# Patient Record
Sex: MALE | Race: BLACK OR AFRICAN AMERICAN | Employment: OTHER | URBAN - METROPOLITAN AREA
[De-identification: names, ages, dates, MRNs, and addresses within clinical notes are randomized per-mention and may not be internally consistent; named-entity substitution may affect disease eponyms.]

---

## 2022-04-19 PROBLEM — K57.90 DIVERTICULOSIS: Status: ACTIVE | Noted: 2022-04-19

## 2022-04-19 PROBLEM — R63.4 WEIGHT LOSS: Status: ACTIVE | Noted: 2022-04-19

## 2022-04-19 PROBLEM — E11.9 TYPE II DIABETES MELLITUS (HCC): Status: ACTIVE | Noted: 2022-04-19

## 2022-04-19 RX ORDER — AMLODIPINE BESYLATE 10 MG/1
10 TABLET ORAL DAILY
COMMUNITY
Start: 2022-04-01

## 2022-04-19 RX ORDER — ROSUVASTATIN CALCIUM 40 MG/1
40 TABLET, COATED ORAL DAILY
COMMUNITY
Start: 2022-04-01

## 2022-04-20 ENCOUNTER — OFFICE VISIT (OUTPATIENT)
Dept: GASTROENTEROLOGY | Age: 72
End: 2022-04-20
Payer: MEDICARE

## 2022-04-20 VITALS
SYSTOLIC BLOOD PRESSURE: 133 MMHG | WEIGHT: 179 LBS | HEART RATE: 84 BPM | HEIGHT: 66 IN | OXYGEN SATURATION: 99 % | RESPIRATION RATE: 18 BRPM | TEMPERATURE: 97.9 F | BODY MASS INDEX: 28.77 KG/M2 | DIASTOLIC BLOOD PRESSURE: 70 MMHG

## 2022-04-20 DIAGNOSIS — R63.4 WEIGHT LOSS: Primary | ICD-10-CM

## 2022-04-20 DIAGNOSIS — R63.0 LOSS OF APPETITE: ICD-10-CM

## 2022-04-20 DIAGNOSIS — R19.7 DIARRHEA, UNSPECIFIED TYPE: ICD-10-CM

## 2022-04-20 PROBLEM — I10 HYPERTENSION: Status: ACTIVE | Noted: 2022-04-20

## 2022-04-20 PROCEDURE — G8419 CALC BMI OUT NRM PARAM NOF/U: HCPCS | Performed by: INTERNAL MEDICINE

## 2022-04-20 PROCEDURE — 3017F COLORECTAL CA SCREEN DOC REV: CPT | Performed by: INTERNAL MEDICINE

## 2022-04-20 PROCEDURE — G8754 DIAS BP LESS 90: HCPCS | Performed by: INTERNAL MEDICINE

## 2022-04-20 PROCEDURE — G8510 SCR DEP NEG, NO PLAN REQD: HCPCS | Performed by: INTERNAL MEDICINE

## 2022-04-20 PROCEDURE — 1101F PT FALLS ASSESS-DOCD LE1/YR: CPT | Performed by: INTERNAL MEDICINE

## 2022-04-20 PROCEDURE — G8752 SYS BP LESS 140: HCPCS | Performed by: INTERNAL MEDICINE

## 2022-04-20 PROCEDURE — G8427 DOCREV CUR MEDS BY ELIG CLIN: HCPCS | Performed by: INTERNAL MEDICINE

## 2022-04-20 PROCEDURE — 82270 OCCULT BLOOD FECES: CPT | Performed by: INTERNAL MEDICINE

## 2022-04-20 PROCEDURE — G8536 NO DOC ELDER MAL SCRN: HCPCS | Performed by: INTERNAL MEDICINE

## 2022-04-20 PROCEDURE — 99204 OFFICE O/P NEW MOD 45 MIN: CPT | Performed by: INTERNAL MEDICINE

## 2022-04-20 RX ORDER — ASPIRIN 81 MG/1
81 TABLET ORAL DAILY
COMMUNITY

## 2022-04-20 RX ORDER — TAMSULOSIN HYDROCHLORIDE 0.4 MG/1
0.4 CAPSULE ORAL DAILY
COMMUNITY

## 2022-04-20 RX ORDER — VALSARTAN 320 MG/1
320 TABLET ORAL DAILY
COMMUNITY

## 2022-04-20 NOTE — PROGRESS NOTES
Chief Complaint   Patient presents with    Weight Loss     1. Have you been to the ER, urgent care clinic since your last visit? Hospitalized since your last visit? No    2. Have you seen or consulted any other health care providers outside of the [address] [place] [address] since your last visit? Include any pap smears or colon screening. No   Visit Vitals  /70 (BP 1 Location: Left arm, BP Patient Position: Sitting, BP Cuff Size: Adult)   Pulse 84   Temp 97.9 °F (36.6 °C) (Oral)   Resp 18   Ht 5' 6\" (1.676 m)   Wt 81.2 kg (179 lb)   SpO2 99%   BMI 28.89 kg/m²   Patient seen by Dr Palmer Wood and EGD ordered. Patient want done on May 10, 2022. Patient will arrive at 10:30 am for 11:30 am procedure. Instructions given, Patient also given and explained how to collect stool cards. paperwork given to patient . Patient has [employer] [facility] [facility] # [id number].

## 2022-04-20 NOTE — PROGRESS NOTES
Layla Arellano is a 71 y.o. male who presents today for the following:  Chief Complaint   Patient presents with    Weight Loss         Allergies   Allergen Reactions    Ace Inhibitors Unknown (comments)     Patient denies        Current Outpatient Medications   Medication Sig    valsartan (DIOVAN) 320 mg tablet Take  by mouth daily.  tamsulosin (FLOMAX) 0.4 mg capsule Take 0.4 mg by mouth daily.  aspirin delayed-release 81 mg tablet Take  by mouth daily.  amLODIPine (NORVASC) 10 mg tablet     rosuvastatin (CRESTOR) 40 mg tablet      No current facility-administered medications for this visit. Past Medical History:   Diagnosis Date    Diverticulosis 4/19/2022    Hypercholesterolemia     Type II diabetes mellitus ([facility] [address] 4/19/2022    Weight loss 4/19/2022       Past Surgical History:   Procedure Laterality Date    COLONOSCOPY  05/13/2015    colon screen       Family History   Problem Relation Age of Onset    Stroke Mother     Stroke Father     Hypertension Other     High Cholesterol Other     Heart Disease Other        Social History     Socioeconomic History    Marital status:      Spouse name: Not on file    Number of children: Not on file    Years of education: Not on file    Highest education level: Not on file   Occupational History    Not on file   Tobacco Use    Smoking status: Never Smoker    Smokeless tobacco: Never Used   Vaping Use    Vaping Use: Never used   Substance and Sexual Activity    Alcohol use: Never    Drug use: Never    Sexual activity: Not on file   Other Topics Concern    Not on file   Social History Narrative    Not on file     Social Determinants of Health     Financial Resource Strain:     Difficulty of Paying Living Expenses: Not on file   Food Insecurity:     Worried About Running Out of Food in the Last Year: Not on file    Raphael of Food in the Last Year: Not on file   Transportation Needs:     Lack of Transportation (Medical):  Not on file    Lack of Transportation (Non-Medical): Not on file   Physical Activity:     Days of Exercise per Week: Not on file    Minutes of Exercise per Session: Not on file   Stress:     Feeling of Stress : Not on file   Social Connections:     Frequency of Communication with Friends and Family: Not on file    Frequency of Social Gatherings with Friends and Family: Not on file    Attends Worship Services: Not on file    Active Member of [address] [place] [address] Own Products or Organizations: Not on file    Attends Club or Organization Meetings: Not on file    Marital Status: Not on file   Intimate Partner Violence:     Fear of Current or Ex-Partner: Not on file    Emotionally Abused: Not on file    Physically Abused: Not on file    Sexually Abused: Not on file   Housing Stability:     Unable to Pay for Housing in the Last Year: Not on file    Number of [place] in the Last Year: Not on file    Unstable Housing in the Last Year: Not on file         HPI  80-year-old male with history of hypertension, hyperlipidemia, diabetes mellitus type 2, BPH, and diverticular disease who comes in for evaluation of weight loss. Patient states he generally feels well. Gio has been losing weight over the last few years. He states he is gone from a high of 280 pounds prior to his diagnosis of diabetes to his present weight of 179 pounds. He states that he stopped using metformin and was started on Jardiance, but that caused further weight loss so he stopped taking the Jardiance and does not take any medicine for his diabetes for the last couple months. He states his blood work was all normal or near normal.  He is not on any medication for his diabetes presently. He states his last glucose that he check was around 120. He does admit that he is not eating as much and his appetite is not as good. He also states he gets full fast.  His main concern continues to be his weight loss.   He states he does take a multivitamin daily as well as may drink Ensure or boost.  He states his bowel movements are still loose even off the metformin. No gross GI bleeding. No black or tarry stools. Review of Systems   Constitutional: Positive for weight loss. HENT: Negative. Negative for nosebleeds. Eyes: Negative. Respiratory: Negative. Cardiovascular: Negative. Gastrointestinal: Positive for abdominal pain and diarrhea. Negative for blood in stool, constipation, heartburn, melena, nausea and vomiting. Genitourinary: Negative. Musculoskeletal: Negative. Skin: Negative. Neurological: Negative. Endo/Heme/Allergies: Negative. Psychiatric/Behavioral: Negative. All other systems reviewed and are negative. Visit Vitals  /70 (BP 1 Location: Left arm, BP Patient Position: Sitting, BP Cuff Size: Adult)   Pulse 84   Temp 97.9 °F (36.6 °C) (Oral)   Resp 18   Ht 5' 6\" (1.676 m)   Wt 81.2 kg (179 lb)   SpO2 99%   BMI 28.89 kg/m²     Physical Exam  Vitals and nursing note reviewed. Constitutional:       Appearance: Normal appearance. He is obese. HENT:      Head: Normocephalic and atraumatic. Nose: Nose normal.      Mouth/Throat:      Mouth: Mucous membranes are moist.      Pharynx: Oropharynx is clear. Eyes:      General: No scleral icterus. Extraocular Movements: Extraocular movements intact. Conjunctiva/sclera: Conjunctivae normal.      Pupils: Pupils are equal, round, and reactive to light. Cardiovascular:      Rate and Rhythm: Normal rate and regular rhythm. Heart sounds: Normal heart sounds. Pulmonary:      Effort: Pulmonary effort is normal.      Breath sounds: Normal breath sounds. Abdominal:      General: Bowel sounds are normal. There is no distension. Palpations: Abdomen is soft. There is no mass. Tenderness: There is no abdominal tenderness. There is no right CVA tenderness, left CVA tenderness, guarding or rebound. Hernia: No hernia is present.    Musculoskeletal: General: Normal range of motion. Cervical back: Normal range of motion and neck supple. Skin:     General: Skin is warm and dry. Coloration: Skin is not jaundiced. Neurological:      General: No focal deficit present. Mental Status: He is alert and oriented to person, place, and time. Psychiatric:         Mood and Affect: Mood normal.         Behavior: Behavior normal.         Thought Content: Thought content normal.         Judgment: Judgment normal.            1. Weight loss  Schedule patient for upper endoscopy to further evaluate the upper digestive tract. We will check stools for occult blood. - UPPER GI ENDOSCOPY,DIAGNOSIS; Future  - AMB POC FECAL BLOOD, OCCULT, QL 3 CARDS    2. Loss of appetite    - UPPER GI ENDOSCOPY,DIAGNOSIS; Future    3. Diarrhea, unspecified type  Uncertain cause.   Prior history of diverticular disease  - UPPER GI ENDOSCOPY,DIAGNOSIS; Future

## 2022-04-20 NOTE — H&P (VIEW-ONLY)
Arpit Cross is a 71 y.o. male who presents today for the following:  Chief Complaint   Patient presents with    Weight Loss         Allergies   Allergen Reactions    Ace Inhibitors Unknown (comments)     Patient denies        Current Outpatient Medications   Medication Sig    valsartan (DIOVAN) 320 mg tablet Take  by mouth daily.  tamsulosin (FLOMAX) 0.4 mg capsule Take 0.4 mg by mouth daily.  aspirin delayed-release 81 mg tablet Take  by mouth daily.  amLODIPine (NORVASC) 10 mg tablet     rosuvastatin (CRESTOR) 40 mg tablet      No current facility-administered medications for this visit. Past Medical History:   Diagnosis Date    Diverticulosis 4/19/2022    Hypercholesterolemia     Type II diabetes mellitus ([facility] [address] 4/19/2022    Weight loss 4/19/2022       Past Surgical History:   Procedure Laterality Date    COLONOSCOPY  05/13/2015    colon screen       Family History   Problem Relation Age of Onset    Stroke Mother     Stroke Father     Hypertension Other     High Cholesterol Other     Heart Disease Other        Social History     Socioeconomic History    Marital status:      Spouse name: Not on file    Number of children: Not on file    Years of education: Not on file    Highest education level: Not on file   Occupational History    Not on file   Tobacco Use    Smoking status: Never Smoker    Smokeless tobacco: Never Used   Vaping Use    Vaping Use: Never used   Substance and Sexual Activity    Alcohol use: Never    Drug use: Never    Sexual activity: Not on file   Other Topics Concern    Not on file   Social History Narrative    Not on file     Social Determinants of Health     Financial Resource Strain:     Difficulty of Paying Living Expenses: Not on file   Food Insecurity:     Worried About Running Out of Food in the Last Year: Not on file    Raphael of Food in the Last Year: Not on file   Transportation Needs:     Lack of Transportation (Medical):  Not on file    Lack of Transportation (Non-Medical): Not on file   Physical Activity:     Days of Exercise per Week: Not on file    Minutes of Exercise per Session: Not on file   Stress:     Feeling of Stress : Not on file   Social Connections:     Frequency of Communication with Friends and Family: Not on file    Frequency of Social Gatherings with Friends and Family: Not on file    Attends Taoism Services: Not on file    Active Member of [address] [place] [address] [employer] or Organizations: Not on file    Attends Club or Organization Meetings: Not on file    Marital Status: Not on file   Intimate Partner Violence:     Fear of Current or Ex-Partner: Not on file    Emotionally Abused: Not on file    Physically Abused: Not on file    Sexually Abused: Not on file   Housing Stability:     Unable to Pay for Housing in the Last Year: Not on file    Number of [place] in the Last Year: Not on file    Unstable Housing in the Last Year: Not on file         HPI  52-year-old male with history of hypertension, hyperlipidemia, diabetes mellitus type 2, BPH, and diverticular disease who comes in for evaluation of weight loss. Patient states he generally feels well. Gio has been losing weight over the last few years. He states he is gone from a high of 280 pounds prior to his diagnosis of diabetes to his present weight of 179 pounds. He states that he stopped using metformin and was started on Jardiance, but that caused further weight loss so he stopped taking the Jardiance and does not take any medicine for his diabetes for the last couple months. He states his blood work was all normal or near normal.  He is not on any medication for his diabetes presently. He states his last glucose that he check was around 120. He does admit that he is not eating as much and his appetite is not as good. He also states he gets full fast.  His main concern continues to be his weight loss.   He states he does take a multivitamin daily as well as may drink Ensure or boost.  He states his bowel movements are still loose even off the metformin. No gross GI bleeding. No black or tarry stools. Review of Systems   Constitutional: Positive for weight loss. HENT: Negative. Negative for nosebleeds. Eyes: Negative. Respiratory: Negative. Cardiovascular: Negative. Gastrointestinal: Positive for abdominal pain and diarrhea. Negative for blood in stool, constipation, heartburn, melena, nausea and vomiting. Genitourinary: Negative. Musculoskeletal: Negative. Skin: Negative. Neurological: Negative. Endo/Heme/Allergies: Negative. Psychiatric/Behavioral: Negative. All other systems reviewed and are negative. Visit Vitals  /70 (BP 1 Location: Left arm, BP Patient Position: Sitting, BP Cuff Size: Adult)   Pulse 84   Temp 97.9 °F (36.6 °C) (Oral)   Resp 18   Ht 5' 6\" (1.676 m)   Wt 81.2 kg (179 lb)   SpO2 99%   BMI 28.89 kg/m²     Physical Exam  Vitals and nursing note reviewed. Constitutional:       Appearance: Normal appearance. He is obese. HENT:      Head: Normocephalic and atraumatic. Nose: Nose normal.      Mouth/Throat:      Mouth: Mucous membranes are moist.      Pharynx: Oropharynx is clear. Eyes:      General: No scleral icterus. Extraocular Movements: Extraocular movements intact. Conjunctiva/sclera: Conjunctivae normal.      Pupils: Pupils are equal, round, and reactive to light. Cardiovascular:      Rate and Rhythm: Normal rate and regular rhythm. Heart sounds: Normal heart sounds. Pulmonary:      Effort: Pulmonary effort is normal.      Breath sounds: Normal breath sounds. Abdominal:      General: Bowel sounds are normal. There is no distension. Palpations: Abdomen is soft. There is no mass. Tenderness: There is no abdominal tenderness. There is no right CVA tenderness, left CVA tenderness, guarding or rebound. Hernia: No hernia is present.    Musculoskeletal: General: Normal range of motion. Cervical back: Normal range of motion and neck supple. Skin:     General: Skin is warm and dry. Coloration: Skin is not jaundiced. Neurological:      General: No focal deficit present. Mental Status: He is alert and oriented to person, place, and time. Psychiatric:         Mood and Affect: Mood normal.         Behavior: Behavior normal.         Thought Content: Thought content normal.         Judgment: Judgment normal.            1. Weight loss  Schedule patient for upper endoscopy to further evaluate the upper digestive tract. We will check stools for occult blood. - UPPER GI ENDOSCOPY,DIAGNOSIS; Future  - AMB POC FECAL BLOOD, OCCULT, QL 3 CARDS    2. Loss of appetite    - UPPER GI ENDOSCOPY,DIAGNOSIS; Future    3. Diarrhea, unspecified type  Uncertain cause.   Prior history of diverticular disease  - UPPER GI ENDOSCOPY,DIAGNOSIS; Future

## 2022-04-25 LAB
HEMOCCULT STL QL: POSITIVE
VALID INTERNAL CONTROL?: YES

## 2022-04-26 ENCOUNTER — TELEPHONE (OUTPATIENT)
Dept: GASTROENTEROLOGY | Age: 72
End: 2022-04-26

## 2022-04-26 NOTE — PROGRESS NOTES
Tell patient that all 3 stool samples were positive for blood. We will further evaluate with the upper endoscopy he is scheduled for next month.

## 2022-04-26 NOTE — TELEPHONE ENCOUNTER
----- Message from Pilar Woodard MD sent at 4/25/2022 10:05 PM EDT -----  Tell patient that all 3 stool samples were positive for blood. We will further evaluate with the upper endoscopy he is scheduled for next month.

## 2022-05-10 ENCOUNTER — HOSPITAL ENCOUNTER (OUTPATIENT)
Age: 72
Setting detail: OUTPATIENT SURGERY
Discharge: HOME | End: 2022-05-10
Attending: INTERNAL MEDICINE | Admitting: INTERNAL MEDICINE
Payer: MEDICARE

## 2022-05-10 ENCOUNTER — ANESTHESIA (OUTPATIENT)
Dept: ENDOSCOPY | Age: 72
End: 2022-05-10
Payer: MEDICARE

## 2022-05-10 ENCOUNTER — ANESTHESIA EVENT (OUTPATIENT)
Dept: ENDOSCOPY | Age: 72
End: 2022-05-10
Payer: MEDICARE

## 2022-05-10 VITALS
HEART RATE: 80 BPM | HEIGHT: 67 IN | TEMPERATURE: 98.1 F | BODY MASS INDEX: 27.47 KG/M2 | SYSTOLIC BLOOD PRESSURE: 142 MMHG | OXYGEN SATURATION: 100 % | WEIGHT: 175 LBS | RESPIRATION RATE: 18 BRPM | DIASTOLIC BLOOD PRESSURE: 74 MMHG

## 2022-05-10 DIAGNOSIS — K29.50 CHRONIC GASTRITIS WITHOUT BLEEDING, UNSPECIFIED GASTRITIS TYPE: Primary | ICD-10-CM

## 2022-05-10 LAB
GLUCOSE BLD STRIP.AUTO-MCNC: 98 MG/DL (ref 65–117)
PERFORMED BY, TECHID: NORMAL

## 2022-05-10 PROCEDURE — 76040000019: Performed by: INTERNAL MEDICINE

## 2022-05-10 PROCEDURE — 88312 SPECIAL STAINS GROUP 1: CPT

## 2022-05-10 PROCEDURE — 88305 TISSUE EXAM BY PATHOLOGIST: CPT

## 2022-05-10 PROCEDURE — 74011000250 HC RX REV CODE- 250: Performed by: NURSE ANESTHETIST, CERTIFIED REGISTERED

## 2022-05-10 PROCEDURE — 74011000258 HC RX REV CODE- 258: Performed by: NURSE ANESTHETIST, CERTIFIED REGISTERED

## 2022-05-10 PROCEDURE — 74011250636 HC RX REV CODE- 250/636: Performed by: NURSE ANESTHETIST, CERTIFIED REGISTERED

## 2022-05-10 PROCEDURE — 82962 GLUCOSE BLOOD TEST: CPT

## 2022-05-10 PROCEDURE — 77030021593 HC FCPS BIOP ENDOSC BSC -A: Performed by: INTERNAL MEDICINE

## 2022-05-10 PROCEDURE — 43239 EGD BIOPSY SINGLE/MULTIPLE: CPT | Performed by: INTERNAL MEDICINE

## 2022-05-10 PROCEDURE — 74011250636 HC RX REV CODE- 250/636: Performed by: INTERNAL MEDICINE

## 2022-05-10 PROCEDURE — 2709999900 HC NON-CHARGEABLE SUPPLY: Performed by: INTERNAL MEDICINE

## 2022-05-10 PROCEDURE — 76060000031 HC ANESTHESIA FIRST 0.5 HR: Performed by: INTERNAL MEDICINE

## 2022-05-10 RX ORDER — SODIUM CHLORIDE 0.9 % (FLUSH) 0.9 %
5-40 SYRINGE (ML) INJECTION AS NEEDED
Status: DISCONTINUED | OUTPATIENT
Start: 2022-05-10 | End: 2022-05-10 | Stop reason: HOSPADM

## 2022-05-10 RX ORDER — PROPOFOL 10 MG/ML
INJECTION, EMULSION INTRAVENOUS AS NEEDED
Status: DISCONTINUED | OUTPATIENT
Start: 2022-05-10 | End: 2022-05-10 | Stop reason: HOSPADM

## 2022-05-10 RX ORDER — SODIUM CHLORIDE 0.9 % (FLUSH) 0.9 %
5-40 SYRINGE (ML) INJECTION EVERY 8 HOURS
Status: DISCONTINUED | OUTPATIENT
Start: 2022-05-10 | End: 2022-05-10 | Stop reason: HOSPADM

## 2022-05-10 RX ORDER — SODIUM CHLORIDE 9 MG/ML
125 INJECTION, SOLUTION INTRAVENOUS CONTINUOUS
Status: DISCONTINUED | OUTPATIENT
Start: 2022-05-10 | End: 2022-05-10 | Stop reason: HOSPADM

## 2022-05-10 RX ORDER — GLYCOPYRROLATE 0.2 MG/ML
INJECTION INTRAMUSCULAR; INTRAVENOUS AS NEEDED
Status: DISCONTINUED | OUTPATIENT
Start: 2022-05-10 | End: 2022-05-10 | Stop reason: HOSPADM

## 2022-05-10 RX ORDER — FAMOTIDINE 40 MG/1
40 TABLET, FILM COATED ORAL DAILY
Qty: 30 TABLET | Refills: 3 | Status: SHIPPED | OUTPATIENT
Start: 2022-05-10 | End: 2022-09-15 | Stop reason: CLARIF

## 2022-05-10 RX ORDER — SODIUM CHLORIDE 9 MG/ML
INJECTION, SOLUTION INTRAVENOUS
Status: DISCONTINUED | OUTPATIENT
Start: 2022-05-10 | End: 2022-05-10 | Stop reason: HOSPADM

## 2022-05-10 RX ADMIN — PROPOFOL 100 MG: 10 INJECTION, EMULSION INTRAVENOUS at 13:52

## 2022-05-10 RX ADMIN — SODIUM CHLORIDE 125 ML/HR: 9 INJECTION, SOLUTION INTRAVENOUS at 10:45

## 2022-05-10 RX ADMIN — GLYCOPYRROLATE 0.2 MG: 0.2 INJECTION INTRAMUSCULAR; INTRAVENOUS at 13:50

## 2022-05-10 RX ADMIN — TOPICAL ANESTHETIC 1 SPRAY: 200 SPRAY DENTAL; PERIODONTAL at 13:50

## 2022-05-10 RX ADMIN — SODIUM CHLORIDE: 9 INJECTION, SOLUTION INTRAVENOUS at 13:47

## 2022-05-10 NOTE — OP NOTES
EGD Procedure Note        Patient: Johnie Mosley MRN: [id number]  SSN: [id number]    YOB: 1950  Age: 71 y.o. Sex: male        Date/Time:  5/10/2022 2:04 PM         IMPRESSION:       1. Generalized gastritis  2. Distal esophagitis (grade 1)       RECOMMENDATIONS:    1. Check biopsy results. 2. Famotidine 40 mg daily    Procedure: Esophagogastroduodenoscopy with cold biopsy    Indication: Weight loss, loss of appetite, diarrhea    Endoscopist:  Desire Vargas MD    Referring Provider:   Rachel Mendez MD    History: The history and physical exam were reviewed and updated. Endoscope: GIF H190 Olympus video endoscope    Extent of Exam: Second part of the duodenum    ASA: Grade 2    Anethesia/Sedation:  TIVA    Description of the procedure: The procedure was discussed with the patient including risks, benefits, alternatives including risks of iv sedation, bleeding, perforation and aspiration. A safety timeout was performed. The patient was placed in the left lateral decubitus position. A bite block was placed. The patient was using standard protocol. The patients vital signs were monitored at all times including heart rate/rhythm, blood pressure and oxygen saturation. The endoscope was then passed under direct visualization to the second part of the duodenum. The endoscope was then slowly withdrawn while visualizing the mucosa. In the stomach a retroflexion was performed and gastric fundus and cardia visualized. The patient was then transferred to recovery in stable condition. Findings:   Esophagus: The esophageal mucosa was inflamed in the distal esophagus consistent with a grade 1 esophagitis. .  Stomach: The gastric mucosa was inflamed throughout the entire stomach with a few scattered erosions. Multiple biopsies were taken in the gastric antrum. .   Duodenum: The duodenum mucosa was normal with no ulceration, mass, stricture and no evidence of villous atrophy. Therapies: None    Specimens:   ID Type Source Tests Collected by Time Destination   1 :  Preservative Stomach, Antrum  Meghann Pacheco MD 5/10/2022 1216 Pathology       Assistants: Eber Ricks           EBL: Minimal    Complications:   None; patient tolerated the procedure well.      Implants: None    Discharge disposition:  Out of the recovery area when discharge criteria met         Christopher Giang MD  May 10, 2022  2:04 PM

## 2022-05-10 NOTE — DISCHARGE INSTRUCTIONS

## 2022-05-10 NOTE — INTERVAL H&P NOTE
Update History & Physical    The Patient's History and Physical of May 10, 2022,  was reviewed with the patient and I examined the patient. There was no change. The surgical site was confirmed by the patient and me. Plan:  The risk, benefits, expected outcome, and alternative to the recommended procedure have been discussed with the patient. Patient understands and wants to proceed with the procedure.     Electronically signed by Dale Morales MD on 5/10/2022 at 11:02 AM

## 2022-05-10 NOTE — ANESTHESIA POSTPROCEDURE EVALUATION
Procedure(s):  ESOPHAGOGASTRODUODENOSCOPY (EGD) (TIVA).     total IV anesthesia    Anesthesia Post Evaluation      Multimodal analgesia: multimodal analgesia not used between 6 hours prior to anesthesia start to PACU discharge  Patient location during evaluation: PACU  Patient participation: complete - patient participated  Pain management: adequate  Airway patency: patent  Anesthetic complications: no  Cardiovascular status: acceptable and stable  Respiratory status: acceptable and room air  Hydration status: acceptable  Post anesthesia nausea and vomiting:  none  Final Post Anesthesia Temperature Assessment:  Normothermia (36.0-37.5 degrees C)      INITIAL Post-op Vital signs:   Vitals Value Taken Time   /85 05/10/22 1401   Temp 36.7 °C (98.1 °F) 05/10/22 1401   Pulse 89 05/10/22 1401   Resp 18 05/10/22 1401   SpO2 100 % 05/10/22 1401

## 2022-05-10 NOTE — ANESTHESIA PREPROCEDURE EVALUATION
Relevant Problems   CARDIOVASCULAR   (+) Hypertension      ENDOCRINE   (+) Type II diabetes mellitus (HCC)       Anesthetic History   No history of anesthetic complications  Other anesthesia complications          Review of Systems / Medical History  Patient summary reviewed, nursing notes reviewed and pertinent labs reviewed    Pulmonary  Within defined limits                 Neuro/Psych   Within defined limits           Cardiovascular    Hypertension                   GI/Hepatic/Renal  Within defined limits              Endo/Other    Diabetes         Other Findings              Physical Exam    Airway  Mallampati: II  TM Distance: 4 - 6 cm  Neck ROM: normal range of motion        Cardiovascular    Rhythm: regular  Rate: normal         Dental    Dentition: Poor dentition and Loose teeth     Pulmonary  Breath sounds clear to auscultation               Abdominal  Abdominal exam normal       Other Findings            Anesthetic Plan    ASA: 3  Anesthesia type: total IV anesthesia            Anesthetic plan and risks discussed with: Patient

## 2022-05-13 ENCOUNTER — TELEPHONE (OUTPATIENT)
Dept: GASTROENTEROLOGY | Age: 72
End: 2022-05-13

## 2022-05-13 DIAGNOSIS — A04.8 HELICOBACTER PYLORI INFECTION: Primary | ICD-10-CM

## 2022-05-13 RX ORDER — PANTOPRAZOLE SODIUM 40 MG/1
40 TABLET, DELAYED RELEASE ORAL DAILY
Qty: 30 TABLET | Refills: 3 | Status: SHIPPED | OUTPATIENT
Start: 2022-05-13 | End: 2022-09-15 | Stop reason: CLARIF

## 2022-05-13 RX ORDER — METRONIDAZOLE 500 MG/1
500 TABLET ORAL 2 TIMES DAILY
Qty: 28 TABLET | Refills: 0 | Status: SHIPPED | OUTPATIENT
Start: 2022-05-13 | End: 2022-07-11 | Stop reason: ALTCHOICE

## 2022-05-13 RX ORDER — AMOXICILLIN 500 MG/1
1000 CAPSULE ORAL 2 TIMES DAILY
Qty: 56 CAPSULE | Refills: 0 | Status: SHIPPED | OUTPATIENT
Start: 2022-05-13 | End: 2022-07-11 | Stop reason: ALTCHOICE

## 2022-05-13 NOTE — TELEPHONE ENCOUNTER
Patient notified that the biopsies taken in his stomach showed gastritis/inflammation.  It also showed an infection with Helicobacter pylori.  This infection will need to be treated.  We will send in 2 antibiotics plus we will change his acid lowering medicine to pantoprazole 40 mg daily. Octavia Powers can stop the famotidine while on that medicine.  Give patient a follow-up visit in 2 months. Appt given.

## 2022-05-13 NOTE — PROGRESS NOTES
Tell patient that the biopsies taken in his stomach showed gastritis/inflammation. It also showed an infection with Helicobacter pylori. This infection will need to be treated. We will send in 2 antibiotics plus we will change his acid lowering medicine to pantoprazole 40 mg daily. He can stop the famotidine while on that medicine. Give patient a follow-up visit in 2 months.

## 2022-07-11 ENCOUNTER — OFFICE VISIT (OUTPATIENT)
Dept: GASTROENTEROLOGY | Age: 72
End: 2022-07-11
Payer: MEDICARE

## 2022-07-11 VITALS
HEIGHT: 67 IN | WEIGHT: 157.6 LBS | SYSTOLIC BLOOD PRESSURE: 140 MMHG | OXYGEN SATURATION: 99 % | RESPIRATION RATE: 18 BRPM | TEMPERATURE: 97.7 F | HEART RATE: 86 BPM | DIASTOLIC BLOOD PRESSURE: 74 MMHG | BODY MASS INDEX: 24.74 KG/M2

## 2022-07-11 DIAGNOSIS — R19.5 OCCULT GI BLEEDING: ICD-10-CM

## 2022-07-11 DIAGNOSIS — R63.4 WEIGHT LOSS: Primary | ICD-10-CM

## 2022-07-11 DIAGNOSIS — A04.8 HELICOBACTER PYLORI INFECTION: ICD-10-CM

## 2022-07-11 DIAGNOSIS — K21.00 GASTROESOPHAGEAL REFLUX DISEASE WITH ESOPHAGITIS WITHOUT HEMORRHAGE: ICD-10-CM

## 2022-07-11 DIAGNOSIS — K29.50 CHRONIC GASTRITIS WITHOUT BLEEDING, UNSPECIFIED GASTRITIS TYPE: ICD-10-CM

## 2022-07-11 PROCEDURE — G8427 DOCREV CUR MEDS BY ELIG CLIN: HCPCS | Performed by: INTERNAL MEDICINE

## 2022-07-11 PROCEDURE — 1101F PT FALLS ASSESS-DOCD LE1/YR: CPT | Performed by: INTERNAL MEDICINE

## 2022-07-11 PROCEDURE — G8420 CALC BMI NORM PARAMETERS: HCPCS | Performed by: INTERNAL MEDICINE

## 2022-07-11 PROCEDURE — 1123F ACP DISCUSS/DSCN MKR DOCD: CPT | Performed by: INTERNAL MEDICINE

## 2022-07-11 PROCEDURE — 3017F COLORECTAL CA SCREEN DOC REV: CPT | Performed by: INTERNAL MEDICINE

## 2022-07-11 PROCEDURE — G8754 DIAS BP LESS 90: HCPCS | Performed by: INTERNAL MEDICINE

## 2022-07-11 PROCEDURE — G8753 SYS BP > OR = 140: HCPCS | Performed by: INTERNAL MEDICINE

## 2022-07-11 PROCEDURE — G8536 NO DOC ELDER MAL SCRN: HCPCS | Performed by: INTERNAL MEDICINE

## 2022-07-11 PROCEDURE — 99214 OFFICE O/P EST MOD 30 MIN: CPT | Performed by: INTERNAL MEDICINE

## 2022-07-11 PROCEDURE — G8510 SCR DEP NEG, NO PLAN REQD: HCPCS | Performed by: INTERNAL MEDICINE

## 2022-07-11 RX ORDER — MEGESTROL ACETATE 40 MG/1
40 TABLET ORAL 2 TIMES DAILY
Qty: 60 TABLET | Refills: 3 | Status: SHIPPED | OUTPATIENT
Start: 2022-07-11 | End: 2022-09-16

## 2022-07-11 NOTE — PROGRESS NOTES
Chief Complaint   Patient presents with    Weight Loss     2 month f/u      1. Have you been to the ER, urgent care clinic since your last visit? Hospitalized since your last visit? No    2. Have you seen or consulted any other health care providers outside of the [address] [place] [address] since your last visit? Include any pap smears or colon screening.  No\  Visit Vitals  BP (!) 140/74 (BP 1 Location: Right arm, BP Patient Position: Sitting, BP Cuff Size: Adult)   Pulse 86   Temp 97.7 °F (36.5 °C) (Oral)   Resp 18   Ht 5' 7\" (1.702 m)   Wt 71.5 kg (157 lb 9.6 oz)   SpO2 99%   BMI 24.68 kg/m²

## 2022-07-12 NOTE — PROGRESS NOTES
Zhang Trevino is a 71 y.o. male who presents today for the following:  Chief Complaint   Patient presents with    Weight Loss     2 month f/u          Allergies   Allergen Reactions    Ace Inhibitors Unknown (comments)     Patient denies        Current Outpatient Medications   Medication Sig    megestroL (MEGACE) 40 mg tablet Take 1 Tablet by mouth two (2) times a day.  pantoprazole (PROTONIX) 40 mg tablet Take 1 Tablet by mouth daily. Indications: inflammation of the stomach lining caused by H. pylori    valsartan (DIOVAN) 320 mg tablet Take  by mouth daily.  tamsulosin (FLOMAX) 0.4 mg capsule Take 0.4 mg by mouth daily.  aspirin delayed-release 81 mg tablet Take  by mouth daily.  amLODIPine (NORVASC) 10 mg tablet     rosuvastatin (CRESTOR) 40 mg tablet     famotidine (PEPCID) 40 mg tablet Take 1 Tablet by mouth daily. No current facility-administered medications for this visit.        Past Medical History:   Diagnosis Date    Diverticulosis 4/19/2022    Hypercholesterolemia     Hyperlipemia     Hypertension     [facility] 4/19/[facility]22    Weight loss 4/19/2022       Past Surgical History:   Procedure Laterality Date    COLONOSCOPY  05/13/2015    colon screen    ENDOSCOPY VISIT-OUTPATIENT  04/20/2022       Family History   Problem Relation Age of Onset    Stroke Mother     Stroke Father     Hypertension Other     High Cholesterol Other     Heart Disease Other        Social History     Socioeconomic History    Marital status:      Spouse name: Not on file    Number of children: Not on file    Years of education: Not on file    Highest education level: Not on file   Occupational History    Not on file   Tobacco Use    Smoking status: Never Smoker    Smokeless tobacco: Never Used   Vaping Use    Vaping Use: Never used   Substance and Sexual Activity    Alcohol use: Never    Drug use: Never    Sexual activity: Not on file   Other Topics Concern    Not on file   Social History Narrative    Not on file     Social Determinants of Health     Financial Resource Strain:     Difficulty of Paying Living Expenses: Not on file   Food Insecurity:     Worried About Running Out of Food in the Last Year: Not on file    Raphael of Food in the Last Year: Not on file   Transportation Needs:     Lack of Transportation (Medical): Not on file    Lack of Transportation (Non-Medical): Not on file   Physical Activity:     Days of Exercise per Week: Not on file    Minutes of Exercise per Session: Not on file   Stress:     Feeling of Stress : Not on file   Social Connections:     Frequency of Communication with Friends and Family: Not on file    Frequency of Social Gatherings with Friends and Family: Not on file    Attends Gnosticist Services: Not on file    Active Member of [address] [place] [address] [employer] or Organizations: Not on file    Attends Club or Organization Meetings: Not on file    Marital Status: Not on file   Intimate Partner Violence:     Fear of Current or Ex-Partner: Not on file    Emotionally Abused: Not on file    Physically Abused: Not on file    Sexually Abused: Not on file   Housing Stability:     Unable to Pay for Housing in the Last Year: Not on file    Number of [place] in the Last Year: Not on file    Unstable Housing in the Last Year: Not on file         HPI  50-year-old male with history of hypertension, hyperlipidemia, diabetes mellitus type 2, BPH, and diverticular disease who comes in for follow-up visit having an EGD to further evaluate abnormal weight loss. The EGD is showed generalized gastritis with a Helicobacter pylori infection and reflux esophagitis. The patient was treated for the H. pylori infection x14 days. Patient states he does feel better. He however still has a poor appetite. He is still losing weight.   He states he tried to drink Ensure and a protein drink however because diarrhea so he stopped using it he states his bowel movements are still loose a lot. No regular indigestion. No oily stools or undigested food matter. No palpitations. He states he completed antibiotic therapy for the H. pylori and remains on pantoprazole 40 mg daily still. Review of Systems   Constitutional: Positive for weight loss. HENT: Negative. Negative for nosebleeds. Eyes: Negative. Respiratory: Negative. Cardiovascular: Negative. Gastrointestinal: Positive for blood in stool and diarrhea. Negative for abdominal pain, constipation, heartburn, melena, nausea and vomiting. Genitourinary: Negative. Musculoskeletal: Positive for joint pain. Skin: Negative. Neurological: Negative. Endo/Heme/Allergies: Negative. Psychiatric/Behavioral: Negative. All other systems reviewed and are negative. Visit Vitals  BP (!) 140/74 (BP 1 Location: Right arm, BP Patient Position: Sitting, BP Cuff Size: Adult)   Pulse 86   Temp 97.7 °F (36.5 °C) (Oral)   Resp 18   Ht 5' 7\" (1.702 m)   Wt 71.5 kg (157 lb 9.6 oz)   SpO2 99%   BMI 24.68 kg/m²     Physical Exam  Vitals and nursing note reviewed. Constitutional:       Appearance: Normal appearance. He is normal weight. HENT:      Head: Normocephalic and atraumatic. Nose: Nose normal.      Mouth/Throat:      Mouth: Mucous membranes are moist.      Pharynx: Oropharynx is clear. Eyes:      General: No scleral icterus. Extraocular Movements: Extraocular movements intact. Conjunctiva/sclera: Conjunctivae normal.      Pupils: Pupils are equal, round, and reactive to light. Cardiovascular:      Rate and Rhythm: Normal rate and regular rhythm. Heart sounds: Normal heart sounds. Pulmonary:      Effort: Pulmonary effort is normal.      Breath sounds: Normal breath sounds. Abdominal:      General: Bowel sounds are normal. There is no distension. Palpations: Abdomen is soft. There is no mass. Tenderness: There is no abdominal tenderness.  There is no right CVA tenderness, left CVA tenderness, guarding or rebound. Hernia: No hernia is present. Musculoskeletal:         General: Normal range of motion. Cervical back: Normal range of motion and neck supple. Skin:     General: Skin is warm and dry. Coloration: Skin is not jaundiced. Neurological:      General: No focal deficit present. Mental Status: He is alert and oriented to person, place, and time. Psychiatric:         Mood and Affect: Mood normal.         Behavior: Behavior normal.         Thought Content: Thought content normal.         Judgment: Judgment normal.            1. Weight loss  Give patient trial of appetite stimulant.  - megestroL (MEGACE) 40 mg tablet; Take 1 Tablet by mouth two (2) times a day. Dispense: 60 Tablet; Refill: 3    2. Occult GI bleeding  Secondary to the generalized gastritis which was noted on the EGD. 3. Helicobacter pylori infection  Patient has completed treatment    4. Chronic gastritis without bleeding, unspecified gastritis type  Probably secondary to the H. pylori infection    5. Gastroesophageal reflux disease with esophagitis without hemorrhage  Continue the pantoprazole 40 mg daily.

## 2022-08-02 ENCOUNTER — TRANSCRIBE ORDER (OUTPATIENT)
Dept: SCHEDULING | Age: 72
End: 2022-08-02

## 2022-08-02 DIAGNOSIS — R63.4 LOSS OF WEIGHT: ICD-10-CM

## 2022-08-02 DIAGNOSIS — R16.0 HEPATOMEGALY: ICD-10-CM

## 2022-08-02 DIAGNOSIS — E80.6 HYPERBILIRUBINEMIA: ICD-10-CM

## 2022-08-02 DIAGNOSIS — R94.5 NONSPECIFIC ABNORMAL RESULTS OF LIVER FUNCTION STUDY: Primary | ICD-10-CM

## 2022-08-04 ENCOUNTER — HOSPITAL ENCOUNTER (OUTPATIENT)
Dept: CT IMAGING | Age: 72
Discharge: HOME | End: 2022-08-04
Attending: FAMILY MEDICINE
Payer: MEDICARE

## 2022-08-04 ENCOUNTER — TRANSCRIBE ORDER (OUTPATIENT)
Dept: REGISTRATION | Age: 72
End: 2022-08-04

## 2022-08-04 ENCOUNTER — HOSPITAL ENCOUNTER (OUTPATIENT)
Dept: LAB | Age: 72
Discharge: HOME | End: 2022-08-04
Attending: FAMILY MEDICINE
Payer: MEDICARE

## 2022-08-04 DIAGNOSIS — R94.5 NONSPECIFIC ABNORMAL RESULTS OF LIVER FUNCTION STUDY: ICD-10-CM

## 2022-08-04 DIAGNOSIS — R16.0 HEPATOMEGALY: ICD-10-CM

## 2022-08-04 DIAGNOSIS — R94.5 NONSPECIFIC ABNORMAL RESULTS OF LIVER FUNCTION STUDY: Primary | ICD-10-CM

## 2022-08-04 DIAGNOSIS — E80.6 HYPERBILIRUBINEMIA: ICD-10-CM

## 2022-08-04 DIAGNOSIS — R63.4 LOSS OF WEIGHT: ICD-10-CM

## 2022-08-04 LAB — CREAT SERPL-MCNC: 0.86 MG/DL (ref 0.7–1.3)

## 2022-08-04 PROCEDURE — 82565 ASSAY OF CREATININE: CPT

## 2022-08-04 PROCEDURE — 36415 COLL VENOUS BLD VENIPUNCTURE: CPT

## 2022-08-04 PROCEDURE — 74170 CT ABD WO CNTRST FLWD CNTRST: CPT

## 2022-08-04 PROCEDURE — 74011000636 HC RX REV CODE- 636: Performed by: FAMILY MEDICINE

## 2022-08-04 RX ADMIN — IOPAMIDOL 100 ML: 755 INJECTION, SOLUTION INTRAVENOUS at 09:51

## 2022-09-15 ENCOUNTER — APPOINTMENT (OUTPATIENT)
Dept: CT IMAGING | Age: 72
End: 2022-09-15
Attending: EMERGENCY MEDICINE
Payer: MEDICARE

## 2022-09-15 ENCOUNTER — HOSPITAL ENCOUNTER (INPATIENT)
Age: 72
LOS: 7 days | Discharge: HOME HEALTH | DRG: 871 | End: 2022-09-22
Attending: EMERGENCY MEDICINE | Admitting: HOSPITALIST
Payer: MEDICARE

## 2022-09-15 ENCOUNTER — HOSPITAL ENCOUNTER (EMERGENCY)
Age: 72
Discharge: ANOTHER HOSPITAL | End: 2022-09-15
Attending: EMERGENCY MEDICINE | Admitting: EMERGENCY MEDICINE
Payer: MEDICARE

## 2022-09-15 ENCOUNTER — APPOINTMENT (OUTPATIENT)
Dept: GENERAL RADIOLOGY | Age: 72
End: 2022-09-15
Attending: EMERGENCY MEDICINE
Payer: MEDICARE

## 2022-09-15 VITALS
HEIGHT: 67 IN | OXYGEN SATURATION: 98 % | SYSTOLIC BLOOD PRESSURE: 114 MMHG | RESPIRATION RATE: 16 BRPM | DIASTOLIC BLOOD PRESSURE: 72 MMHG | HEART RATE: 99 BPM | WEIGHT: 146 LBS | BODY MASS INDEX: 22.91 KG/M2 | TEMPERATURE: 97.9 F

## 2022-09-15 DIAGNOSIS — R65.10 SIRS (SYSTEMIC INFLAMMATORY RESPONSE SYNDROME) (HCC): ICD-10-CM

## 2022-09-15 DIAGNOSIS — J18.9 PNEUMONIA OF BOTH LUNGS DUE TO INFECTIOUS ORGANISM, UNSPECIFIED PART OF LUNG: ICD-10-CM

## 2022-09-15 DIAGNOSIS — N17.9 AKI (ACUTE KIDNEY INJURY) (HCC): ICD-10-CM

## 2022-09-15 DIAGNOSIS — A04.8 HELICOBACTER PYLORI INFECTION: ICD-10-CM

## 2022-09-15 DIAGNOSIS — I82.403 DEEP VEIN THROMBOSIS (DVT) OF BOTH LOWER EXTREMITIES, UNSPECIFIED CHRONICITY, UNSPECIFIED VEIN (HCC): ICD-10-CM

## 2022-09-15 DIAGNOSIS — I21.4 NSTEMI (NON-ST ELEVATED MYOCARDIAL INFARCTION) (HCC): ICD-10-CM

## 2022-09-15 DIAGNOSIS — A41.9 SEPSIS, DUE TO UNSPECIFIED ORGANISM, UNSPECIFIED WHETHER ACUTE ORGAN DYSFUNCTION PRESENT (HCC): Primary | ICD-10-CM

## 2022-09-15 DIAGNOSIS — I21.4 NSTEMI (NON-ST ELEVATED MYOCARDIAL INFARCTION) (HCC): Primary | ICD-10-CM

## 2022-09-15 DIAGNOSIS — J18.9 COMMUNITY ACQUIRED PNEUMONIA, UNSPECIFIED LATERALITY: ICD-10-CM

## 2022-09-15 PROBLEM — C25.0 CARCINOMA OF HEAD OF PANCREAS (HCC): Status: ACTIVE | Noted: 2022-09-15

## 2022-09-15 PROBLEM — I95.9 HYPOTENSION: Status: ACTIVE | Noted: 2022-09-15

## 2022-09-15 LAB
ALBUMIN SERPL-MCNC: 2.3 G/DL (ref 3.5–5)
ALBUMIN/GLOB SERPL: 0.5 {RATIO} (ref 1.1–2.2)
ALP SERPL-CCNC: 145 U/L (ref 45–117)
ALT SERPL-CCNC: 17 U/L (ref 12–78)
AMORPH CRY URNS QL MICRO: ABNORMAL
ANION GAP SERPL CALC-SCNC: 11 MMOL/L (ref 5–15)
APPEARANCE UR: ABNORMAL
APTT PPP: 39.7 SEC (ref 21.2–34.1)
APTT PPP: 69.2 SEC (ref 21.2–34.1)
AST SERPL W P-5'-P-CCNC: 36 U/L (ref 15–37)
BACTERIA URNS QL MICRO: ABNORMAL /HPF
BASOPHILS # BLD: 0 K/UL (ref 0–0.1)
BASOPHILS # BLD: 0 K/UL (ref 0–0.2)
BASOPHILS NFR BLD: 0 % (ref 0–1)
BASOPHILS NFR BLD: 0 % (ref 0–2.5)
BILIRUB SERPL-MCNC: 1 MG/DL (ref 0.2–1)
BILIRUB UR QL CFM: POSITIVE
BUN SERPL-MCNC: 22 MG/DL (ref 6–20)
BUN/CREAT SERPL: 12 (ref 12–20)
CA-I BLD-MCNC: 8.9 MG/DL (ref 8.5–10.1)
CHLORIDE SERPL-SCNC: 98 MMOL/L (ref 97–108)
CO2 SERPL-SCNC: 26 MMOL/L (ref 21–32)
COLOR UR: ABNORMAL
CREAT SERPL-MCNC: 1.77 MG/DL (ref 0.7–1.3)
DIFFERENTIAL METHOD BLD: ABNORMAL
EOSINOPHIL # BLD: 0 K/UL (ref 0–0.4)
EOSINOPHIL # BLD: 0 K/UL (ref 0–0.7)
EOSINOPHIL NFR BLD: 0 % (ref 0.9–2.9)
EOSINOPHIL NFR BLD: 0 % (ref 0–7)
ERYTHROCYTE [DISTWIDTH] IN BLOOD BY AUTOMATED COUNT: 13.8 % (ref 11.5–14.5)
ERYTHROCYTE [DISTWIDTH] IN BLOOD BY AUTOMATED COUNT: 14.7 % (ref 11.5–14.5)
FLUAV RNA SPEC QL NAA+PROBE: NOT DETECTED
FLUBV RNA SPEC QL NAA+PROBE: NOT DETECTED
GLOBULIN SER CALC-MCNC: 5 G/DL (ref 2–4)
GLUCOSE SERPL-MCNC: 137 MG/DL (ref 65–100)
GLUCOSE UR STRIP.AUTO-MCNC: NEGATIVE MG/DL
HCT VFR BLD AUTO: 27.8 % (ref 36.6–50.3)
HCT VFR BLD AUTO: 33.7 % (ref 41–53)
HGB BLD-MCNC: 11.1 G/DL (ref 13.5–17.5)
HGB BLD-MCNC: 9.1 G/DL (ref 12.1–17)
HGB UR QL STRIP: NEGATIVE
IMM GRANULOCYTES # BLD AUTO: 0.3 K/UL (ref 0–0.04)
IMM GRANULOCYTES NFR BLD AUTO: 2 % (ref 0–0.5)
KETONES UR QL STRIP.AUTO: NEGATIVE MG/DL
LACTATE SERPL-SCNC: 2.1 MMOL/L (ref 0.4–2)
LACTATE SERPL-SCNC: 2.4 MMOL/L (ref 0.4–2)
LACTATE SERPL-SCNC: 2.4 MMOL/L (ref 0.4–2)
LEUKOCYTE ESTERASE UR QL STRIP.AUTO: NEGATIVE
LYMPHOCYTES # BLD: 1.1 K/UL (ref 0.8–3.5)
LYMPHOCYTES # BLD: 1.2 K/UL (ref 1–4.8)
LYMPHOCYTES NFR BLD: 6 % (ref 20.5–51.1)
LYMPHOCYTES NFR BLD: 8 % (ref 12–49)
MCH RBC QN AUTO: 27.9 PG (ref 26–34)
MCH RBC QN AUTO: 27.9 PG (ref 31–34)
MCHC RBC AUTO-ENTMCNC: 32.7 G/DL (ref 30–36.5)
MCHC RBC AUTO-ENTMCNC: 32.9 G/DL (ref 31–36)
MCV RBC AUTO: 84.8 FL (ref 80–100)
MCV RBC AUTO: 85.3 FL (ref 80–99)
MONOCYTES # BLD: 1 K/UL (ref 0–1)
MONOCYTES # BLD: 1.5 K/UL (ref 0.2–2.4)
MONOCYTES NFR BLD: 7 % (ref 5–13)
MONOCYTES NFR BLD: 8 % (ref 1.7–9.3)
NEUTS SEG # BLD: 12.2 K/UL (ref 1.8–8)
NEUTS SEG # BLD: 17.2 K/UL (ref 1.8–7.7)
NEUTS SEG NFR BLD: 83 % (ref 32–75)
NEUTS SEG NFR BLD: 86 % (ref 42–75)
NITRITE UR QL STRIP.AUTO: NEGATIVE
NRBC # BLD: 0 K/UL (ref 0–0.01)
NRBC # BLD: 0.04 K/UL
NRBC BLD-RTO: 0 PER 100 WBC
NRBC BLD-RTO: 0.2 PER 100 WBC
PH UR STRIP: 5.5 [PH] (ref 5–8)
PLATELET # BLD AUTO: 191 K/UL (ref 150–400)
PLATELET # BLD AUTO: 291 K/UL (ref 150–400)
PMV BLD AUTO: 7 FL (ref 6.5–11.5)
PMV BLD AUTO: 9.4 FL (ref 8.9–12.9)
POTASSIUM SERPL-SCNC: 3.7 MMOL/L (ref 3.5–5.1)
PROT SERPL-MCNC: 7.3 G/DL (ref 6.4–8.2)
PROT UR STRIP-MCNC: 30 MG/DL
RBC # BLD AUTO: 3.26 M/UL (ref 4.1–5.7)
RBC # BLD AUTO: 3.98 M/UL (ref 4.5–5.9)
RBC #/AREA URNS HPF: ABNORMAL /HPF (ref 0–3)
SARS-COV-2, COV2: NOT DETECTED
SODIUM SERPL-SCNC: 135 MMOL/L (ref 136–145)
SP GR UR REFRACTOMETRY: >1.03 (ref 1–1.03)
SPERM URNS QL MICRO: PRESENT
THERAPEUTIC RANGE,PTTT: ABNORMAL SEC (ref 82–109)
THERAPEUTIC RANGE,PTTT: ABNORMAL SEC (ref 82–109)
TROPONIN-HIGH SENSITIVITY: 1924 NG/L (ref 0–76)
TROPONIN-HIGH SENSITIVITY: 1981 NG/L (ref 0–76)
TROPONIN-HIGH SENSITIVITY: 1990 NG/L (ref 0–76)
UROBILINOGEN UR QL STRIP.AUTO: 0.2 EU/DL (ref 0.2–1)
WBC # BLD AUTO: 14.6 K/UL (ref 4.1–11.1)
WBC # BLD AUTO: 19.9 K/UL (ref 4.4–11.3)
WBC URNS QL MICRO: ABNORMAL /HPF (ref 0–5)

## 2022-09-15 PROCEDURE — 85730 THROMBOPLASTIN TIME PARTIAL: CPT

## 2022-09-15 PROCEDURE — 74011250636 HC RX REV CODE- 250/636: Performed by: EMERGENCY MEDICINE

## 2022-09-15 PROCEDURE — 74011250637 HC RX REV CODE- 250/637: Performed by: EMERGENCY MEDICINE

## 2022-09-15 PROCEDURE — 96360 HYDRATION IV INFUSION INIT: CPT

## 2022-09-15 PROCEDURE — 74011250636 HC RX REV CODE- 250/636: Performed by: HOSPITALIST

## 2022-09-15 PROCEDURE — 74176 CT ABD & PELVIS W/O CONTRAST: CPT

## 2022-09-15 PROCEDURE — 99285 EMERGENCY DEPT VISIT HI MDM: CPT

## 2022-09-15 PROCEDURE — 96376 TX/PRO/DX INJ SAME DRUG ADON: CPT

## 2022-09-15 PROCEDURE — 96367 TX/PROPH/DG ADDL SEQ IV INF: CPT

## 2022-09-15 PROCEDURE — 74011000258 HC RX REV CODE- 258: Performed by: EMERGENCY MEDICINE

## 2022-09-15 PROCEDURE — 96365 THER/PROPH/DIAG IV INF INIT: CPT

## 2022-09-15 PROCEDURE — 83605 ASSAY OF LACTIC ACID: CPT

## 2022-09-15 PROCEDURE — 74011000258 HC RX REV CODE- 258: Performed by: HOSPITALIST

## 2022-09-15 PROCEDURE — 71250 CT THORAX DX C-: CPT

## 2022-09-15 PROCEDURE — 93005 ELECTROCARDIOGRAM TRACING: CPT

## 2022-09-15 PROCEDURE — 84484 ASSAY OF TROPONIN QUANT: CPT

## 2022-09-15 PROCEDURE — 65270000029 HC RM PRIVATE

## 2022-09-15 PROCEDURE — 85025 COMPLETE CBC W/AUTO DIFF WBC: CPT

## 2022-09-15 PROCEDURE — 36415 COLL VENOUS BLD VENIPUNCTURE: CPT

## 2022-09-15 PROCEDURE — 96366 THER/PROPH/DIAG IV INF ADDON: CPT

## 2022-09-15 PROCEDURE — 74011000250 HC RX REV CODE- 250: Performed by: HOSPITALIST

## 2022-09-15 PROCEDURE — 74011250637 HC RX REV CODE- 250/637: Performed by: HOSPITALIST

## 2022-09-15 PROCEDURE — 71045 X-RAY EXAM CHEST 1 VIEW: CPT

## 2022-09-15 PROCEDURE — 87636 SARSCOV2 & INF A&B AMP PRB: CPT

## 2022-09-15 PROCEDURE — 96361 HYDRATE IV INFUSION ADD-ON: CPT

## 2022-09-15 PROCEDURE — 96375 TX/PRO/DX INJ NEW DRUG ADDON: CPT

## 2022-09-15 PROCEDURE — 81001 URINALYSIS AUTO W/SCOPE: CPT

## 2022-09-15 PROCEDURE — 87040 BLOOD CULTURE FOR BACTERIA: CPT

## 2022-09-15 PROCEDURE — 80053 COMPREHEN METABOLIC PANEL: CPT

## 2022-09-15 RX ORDER — ONDANSETRON 4 MG/1
4 TABLET, ORALLY DISINTEGRATING ORAL
Status: DISCONTINUED | OUTPATIENT
Start: 2022-09-15 | End: 2022-09-22 | Stop reason: HOSPADM

## 2022-09-15 RX ORDER — SODIUM CHLORIDE 0.9 % (FLUSH) 0.9 %
5-40 SYRINGE (ML) INJECTION AS NEEDED
Status: DISCONTINUED | OUTPATIENT
Start: 2022-09-15 | End: 2022-09-22 | Stop reason: HOSPADM

## 2022-09-15 RX ORDER — SODIUM CHLORIDE, SODIUM LACTATE, POTASSIUM CHLORIDE, CALCIUM CHLORIDE 600; 310; 30; 20 MG/100ML; MG/100ML; MG/100ML; MG/100ML
980 INJECTION, SOLUTION INTRAVENOUS ONCE
Status: COMPLETED | OUTPATIENT
Start: 2022-09-15 | End: 2022-09-15

## 2022-09-15 RX ORDER — ACETAMINOPHEN 325 MG/1
650 TABLET ORAL
Status: COMPLETED | OUTPATIENT
Start: 2022-09-15 | End: 2022-09-15

## 2022-09-15 RX ORDER — ONDANSETRON 2 MG/ML
4 INJECTION INTRAMUSCULAR; INTRAVENOUS
Status: DISCONTINUED | OUTPATIENT
Start: 2022-09-15 | End: 2022-09-22 | Stop reason: HOSPADM

## 2022-09-15 RX ORDER — HEPARIN SODIUM 10000 [USP'U]/100ML
12-25 INJECTION, SOLUTION INTRAVENOUS
Status: DISCONTINUED | OUTPATIENT
Start: 2022-09-15 | End: 2022-09-15 | Stop reason: HOSPADM

## 2022-09-15 RX ORDER — HEPARIN SODIUM 1000 [USP'U]/ML
30 INJECTION, SOLUTION INTRAVENOUS; SUBCUTANEOUS AS NEEDED
Status: DISCONTINUED | OUTPATIENT
Start: 2022-09-15 | End: 2022-09-18

## 2022-09-15 RX ORDER — LEVOFLOXACIN 5 MG/ML
750 INJECTION, SOLUTION INTRAVENOUS EVERY 24 HOURS
Status: DISCONTINUED | OUTPATIENT
Start: 2022-09-15 | End: 2022-09-15 | Stop reason: HOSPADM

## 2022-09-15 RX ORDER — ASPIRIN 81 MG/1
81 TABLET ORAL DAILY
Status: DISCONTINUED | OUTPATIENT
Start: 2022-09-16 | End: 2022-09-22 | Stop reason: HOSPADM

## 2022-09-15 RX ORDER — DEXTROSE, SODIUM CHLORIDE, AND POTASSIUM CHLORIDE 5; .9; .15 G/100ML; G/100ML; G/100ML
125 INJECTION INTRAVENOUS CONTINUOUS
Status: DISPENSED | OUTPATIENT
Start: 2022-09-15 | End: 2022-09-16

## 2022-09-15 RX ORDER — MEGESTROL ACETATE 20 MG/1
40 TABLET ORAL 2 TIMES DAILY
Status: DISCONTINUED | OUTPATIENT
Start: 2022-09-15 | End: 2022-09-22 | Stop reason: HOSPADM

## 2022-09-15 RX ORDER — OMEPRAZOLE 40 MG/1
40 CAPSULE, DELAYED RELEASE ORAL
COMMUNITY
Start: 2022-09-12

## 2022-09-15 RX ORDER — SODIUM CHLORIDE 0.9 % (FLUSH) 0.9 %
5-40 SYRINGE (ML) INJECTION EVERY 8 HOURS
Status: DISCONTINUED | OUTPATIENT
Start: 2022-09-15 | End: 2022-09-21

## 2022-09-15 RX ORDER — HEPARIN SODIUM 10000 [USP'U]/100ML
12-25 INJECTION, SOLUTION INTRAVENOUS
Status: DISCONTINUED | OUTPATIENT
Start: 2022-09-15 | End: 2022-09-16

## 2022-09-15 RX ORDER — HEPARIN SODIUM 1000 [USP'U]/ML
60 INJECTION, SOLUTION INTRAVENOUS; SUBCUTANEOUS AS NEEDED
Status: DISCONTINUED | OUTPATIENT
Start: 2022-09-15 | End: 2022-09-18

## 2022-09-15 RX ORDER — PANTOPRAZOLE SODIUM 20 MG/1
40 TABLET, DELAYED RELEASE ORAL
Status: DISCONTINUED | OUTPATIENT
Start: 2022-09-16 | End: 2022-09-22 | Stop reason: HOSPADM

## 2022-09-15 RX ORDER — SODIUM CHLORIDE 0.9 % (FLUSH) 0.9 %
5-10 SYRINGE (ML) INJECTION AS NEEDED
Status: DISCONTINUED | OUTPATIENT
Start: 2022-09-15 | End: 2022-09-15 | Stop reason: HOSPADM

## 2022-09-15 RX ORDER — VANCOMYCIN 1.5 G/300ML
1500 INJECTION, SOLUTION INTRAVENOUS ONCE
Status: DISCONTINUED | OUTPATIENT
Start: 2022-09-15 | End: 2022-09-15 | Stop reason: HOSPADM

## 2022-09-15 RX ORDER — ACETAMINOPHEN 325 MG/1
650 TABLET ORAL
Status: DISCONTINUED | OUTPATIENT
Start: 2022-09-15 | End: 2022-09-22 | Stop reason: HOSPADM

## 2022-09-15 RX ORDER — OXYCODONE HYDROCHLORIDE 5 MG/1
5 TABLET ORAL
COMMUNITY
Start: 2022-09-12

## 2022-09-15 RX ORDER — OXYCODONE HYDROCHLORIDE 5 MG/1
5 TABLET ORAL
Status: DISCONTINUED | OUTPATIENT
Start: 2022-09-15 | End: 2022-09-22 | Stop reason: HOSPADM

## 2022-09-15 RX ORDER — NOREPINEPHRINE BIT/0.9 % NACL 8 MG/250ML
.5-3 INFUSION BOTTLE (ML) INTRAVENOUS
Status: DISCONTINUED | OUTPATIENT
Start: 2022-09-15 | End: 2022-09-16

## 2022-09-15 RX ORDER — MIRTAZAPINE 15 MG/1
15 TABLET, FILM COATED ORAL
Status: DISCONTINUED | OUTPATIENT
Start: 2022-09-15 | End: 2022-09-22 | Stop reason: HOSPADM

## 2022-09-15 RX ORDER — HEPARIN SODIUM 10000 [USP'U]/100ML
12-25 INJECTION, SOLUTION INTRAVENOUS
Status: DISCONTINUED | OUTPATIENT
Start: 2022-09-15 | End: 2022-09-18

## 2022-09-15 RX ORDER — MIRTAZAPINE 15 MG/1
15 TABLET, FILM COATED ORAL
COMMUNITY
Start: 2022-09-12

## 2022-09-15 RX ORDER — ACETAMINOPHEN 650 MG/1
650 SUPPOSITORY RECTAL
Status: DISCONTINUED | OUTPATIENT
Start: 2022-09-15 | End: 2022-09-22 | Stop reason: HOSPADM

## 2022-09-15 RX ADMIN — POTASSIUM CHLORIDE, DEXTROSE MONOHYDRATE AND SODIUM CHLORIDE 125 ML/HR: 150; 5; 900 INJECTION, SOLUTION INTRAVENOUS at 21:24

## 2022-09-15 RX ADMIN — VANCOMYCIN 1500 MG: 1.5 INJECTION, SOLUTION INTRAVENOUS at 16:57

## 2022-09-15 RX ADMIN — Medication 7 MCG/MIN: at 22:00

## 2022-09-15 RX ADMIN — LEVOFLOXACIN 750 MG: 750 INJECTION, SOLUTION INTRAVENOUS at 15:35

## 2022-09-15 RX ADMIN — ACETAMINOPHEN 650 MG: 325 TABLET ORAL at 15:31

## 2022-09-15 RX ADMIN — SODIUM CHLORIDE 1000 ML: 9 INJECTION, SOLUTION INTRAVENOUS at 14:53

## 2022-09-15 RX ADMIN — PIPERACILLIN AND TAZOBACTAM 4.5 G: 4; .5 INJECTION, POWDER, FOR SOLUTION INTRAVENOUS at 21:30

## 2022-09-15 RX ADMIN — Medication 4 MCG/MIN: at 21:24

## 2022-09-15 RX ADMIN — HEPARIN SODIUM 12 UNITS/KG/HR: 10000 INJECTION, SOLUTION INTRAVENOUS at 16:19

## 2022-09-15 RX ADMIN — SODIUM CHLORIDE, POTASSIUM CHLORIDE, SODIUM LACTATE AND CALCIUM CHLORIDE 980 ML: 600; 310; 30; 20 INJECTION, SOLUTION INTRAVENOUS at 15:20

## 2022-09-15 RX ADMIN — SODIUM CHLORIDE 1000 ML: 9 INJECTION, SOLUTION INTRAVENOUS at 18:43

## 2022-09-15 RX ADMIN — SODIUM CHLORIDE, PRESERVATIVE FREE 10 ML: 5 INJECTION INTRAVENOUS at 22:00

## 2022-09-15 RX ADMIN — PIPERACILLIN AND TAZOBACTAM 4.5 G: 4; .5 INJECTION, POWDER, LYOPHILIZED, FOR SOLUTION INTRAVENOUS at 15:00

## 2022-09-15 NOTE — Clinical Note
Plan for Filter insertion. IVC wire inserted. Pre-deployment venacavogram done. Renal veins visualized. IVC filter inserted. Post-deployment venacavogram done. IVC placement verified. Access site: hemostasis. IVC tolerated well.

## 2022-09-15 NOTE — ED TRIAGE NOTES
Patient brought in from home via [facility] for a syncopal episode while sitting in the chair at home. Patient did not fall, patient does not remember episode. Has hx of pancreatic & lung cancer, currently receiving treatment for both. Reports diminished appetite over the last few days. After syncopal episode, home health nurse obtained severeal hypotensive BP readings & patient was also hypotensive with EMS. EMS stated that patient was in a-fib with HR of 80s-220s. At triage, patient is in sinus tach with PVC, rate as high as 120s.

## 2022-09-15 NOTE — Clinical Note
Status[de-identified] INPATIENT [101]   Type of Bed: Intensive Care [6]   Inpatient Hospitalization Certified Necessary for the Following Reasons: 3.  Patient receiving treatment that can only be provided in an inpatient setting (further clarification in H&P documentation)   Admitting Diagnosis: NSTEMI (non-ST elevated myocardial infarction) [facility]) [9407020]   Admitting Diagnosis: Hypotension [420584]   Admitting Diagnosis: Carcinoma of head of pancreas [facility] [[id number]]   Admitting Physician: Ciro Valles [[id number]]   Attending Physician: Ciro Valles [[id number]]   Estimated Length of Stay: 2 Midnights   [facility]de[facility]identified[facility]

## 2022-09-15 NOTE — ED NOTES
Per Dr Laron Elias, patient was accepted at [facility] ED, however after speaking with family, family wants patient transferred to [facility] in [place]. Contacted transfer center about this request, states they will reach out to [facility] & give us a call back.

## 2022-09-15 NOTE — Clinical Note
TRANSFER - OUT REPORT:     Verbal report given to: [facility]. Report consisted of patient's Situation, Background, Assessment and   Recommendations(SBAR). Opportunity for questions and clarification was provided. Patient transported to: [facility].

## 2022-09-15 NOTE — H&P
Hospitalist History & Physical Notes. [facility]. Name : Pratima Chávez      MRN number : [id number]     YOB: 1950     Subjective :   Chief Complaint : Syncope, Hypotension noted by home health. Source of information : Patient, daughters at bedside. History of present illness:   71 y.o. 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 As per information patient was sitting in the chair, felt dizzy passed out which he cannot recall. Did not have any fall, has home health nurse evaluating him at that time. She found with significant tachycardia and hypotension, repeated check multiple times with persistent hypotension called emergency crew. They found him with a sinus tachycardia with a heart rate of 120s with the hypotension. There is question about atrial fibrillation when nursing staff was checking. He is started on IV fluids and brought to the emergency room. Patient complains of feeling very weak and tired, has low back pain. Denies any fever or chills. Denies any cough or trouble breathing. No chest pain or palpitations. He continued to have hypotension after 3 L of IV fluids in the emergency room total including the fluids given during transportation. Also found with elevated troponin suggestive of non-ST elevation MI.   Still patient was hypotensive after fluids so started on Levophed and admitted to ICU    Past Medical History:   Diagnosis Date    Diverticulosis 4/19/2022    Hypercholesterolemia     Hyperlipemia     Hypertension     Type II diabetes mellitus ([facility] 4/19/2022    Weight loss 4/19/2022     Past Surgical History:   Procedure Laterality Date    COLONOSCOPY  05/13/2015    colon screen    ENDOSCOPY VISIT-OUTPATIENT  04/20/2022     Family History   Problem Relation Age of Onset    Stroke Mother     Stroke Father     Hypertension Other     High Cholesterol Other     Heart Disease Other       Social History     Tobacco Use    Smoking status: Never    Smokeless tobacco: Never   Substance Use Topics    Alcohol use: Never       Prior to Admission medications    Medication Sig Start Date End Date Taking? Authorizing Provider   oxyCODONE IR (ROXICODONE) 5 mg immediate release tablet Take 5 mg by mouth every six (6) hours as needed for Severe Pain. 9/12/22   Provider, Historical   mirtazapine (REMERON) 15 mg tablet Take 15 mg by mouth nightly. 9/12/22   Provider, Historical   omeprazole (PRILOSEC) 40 mg capsule Take 40 mg by mouth Daily (before breakfast). 9/12/22   Provider, Historical   megestroL (MEGACE) 40 mg tablet Take 1 Tablet by mouth two (2) times a day. 7/11/22   Richard Martin MD   valsartan (DIOVAN) 320 mg tablet Take  by mouth daily. Provider, Historical   tamsulosin (FLOMAX) 0.4 mg capsule Take 0.4 mg by mouth daily. Provider, Historical   aspirin delayed-release 81 mg tablet Take  by mouth daily. Provider, Historical   amLODIPine (NORVASC) 10 mg tablet  4/1/22   Provider, Historical   rosuvastatin (CRESTOR) 40 mg tablet  4/1/22   Provider, Historical     No Known Allergies          Review of Systems:  Constitutional: Appetite is very poor, not eating well. Has significant weight loss for few months. No fever, no chills, no night sweats. Eye: No recent visual disturbances, no discharge, no double vision. Ear/nose/mouth/throat : No hearing disturbance, no ear pain, no nasal congestion, no sore throat, no trouble swallowing. Respiratory : No trouble breathing, no cough, no shortness of breath, no hemoptysis, no wheezing. Cardiovascular : No chest pain, no palpitation,  no orthopnea, no peripheral edema. Gastrointestinal : No nausea, no vomiting, +++ diarrhea for the last few months, ++ heartburn, No abdominal pain.   Genitourinary : No dysuria, no hematuria, .  Endocrine : No excessive thirst, No polyuria . Immunologic :  No seasonal allergies. Musculoskeletal : Diffuse generalized body aches. No joint swelling, No pain,  +++ back pain, No neck pain. Integumentary : No rash, No pruritus,   Hematology : No petechiae, No easy bruising,  No tendency to bleed easy. Neurology : Denies change in mental status, No confusion, No numbness or tingling. Psychiatric : Feeling anxious. Vitals:   Patient Vitals for the past 12 hrs:   Temp Pulse Resp BP SpO2   09/15/22 1900 -- 94 18 (!) 82/58 99 %   09/15/22 1845 -- 96 17 (!) 90/59 100 %   09/15/22 1838 -- -- -- -- 100 %   09/15/22 1830 -- 96 20 (!) 84/57 100 %   09/15/22 1813 98 °F (36.7 °C) 99 23 93/62 100 %       Physical Exam:   General : Looks tired and weak with no respiratory distress noted. HEENT : PERRLA, normal oral mucosa, atraumatic normocephalic, Normal ear and nose. Neck : Supple, no JVD, no masses noted, no carotid bruit. Lungs : Breath sounds with moderate air entry bilaterally, no wheezes or rales, no accessory muscle use. CVS : Rhythm rate regular, S1+, S2+, no murmur or gallop. No heart rate is in the 90s. .  Abdomen : Soft, mild tenderness on palpation, no rigidity noted. Bowel sounds present. Extremities : No edema noted,  pedal pulses not palpable. Musculoskeletal : Fair range of motion, no joint swelling or effusion, muscle tone and power appears decreased. Skin : Dry, poor skin turgor no pathological rash. Lymphatic : No cervical lymphadenopathy. Neurological : Awake, alert, oriented to time place person. No focal deficits  Psychiatric : Mood and affect appears appropriate to the situation.        Data Review:   Recent Results (from the past 24 hour(s))   CBC WITH AUTOMATED DIFF    Collection Time: 09/15/22  2:10 PM   Result Value Ref Range    WBC 19.9 (H) 4.4 - 11.3 K/uL    RBC 3.98 (L) 4.50 - 5.90 M/uL    HGB 11.1 (L) 13.5 - 17.5 g/dL    HCT 33.7 (L) 41 - 53 %    MCV 84.8 80 - 100 FL MCH 27.9 (L) 31 - 34 PG    MCHC 32.9 31.0 - 36.0 g/dL    RDW 14.7 (H) 11.5 - 14.5 %    PLATELET 201 094 - 466 K/uL    MPV 7.0 6.5 - 11.5 FL    NRBC 0.2  WBC    ABSOLUTE NRBC 0.04 K/uL    NEUTROPHILS 86 (H) 42 - 75 %    LYMPHOCYTES 6 (L) 20.5 - 51.1 %    MONOCYTES 8 1.7 - 9.3 %    EOSINOPHILS 0 (L) 0.9 - 2.9 %    BASOPHILS 0 0.0 - 2.5 %    ABS. NEUTROPHILS 17.2 (H) 1.8 - 7.7 K/UL    ABS. LYMPHOCYTES 1.2 1.0 - 4.8 K/UL    ABS. MONOCYTES 1.5 0.2 - 2.4 K/UL    ABS. EOSINOPHILS 0.0 0.0 - 0.7 K/UL    ABS. BASOPHILS 0.0 0.0 - 0.2 K/UL   METABOLIC PANEL, COMPREHENSIVE    Collection Time: 09/15/22  2:10 PM   Result Value Ref Range    Sodium 135 (L) 136 - 145 mmol/L    Potassium 3.7 3.5 - 5.1 mmol/L    Chloride 98 97 - 108 mmol/L    CO2 26 21 - 32 mmol/L    Anion gap 11 5 - 15 mmol/L    Glucose 137 (H) 65 - 100 mg/dL    BUN 22 (H) 6 - 20 mg/dL    Creatinine 1.77 (H) 0.70 - 1.30 mg/dL    BUN/Creatinine ratio 12 12 - 20      GFR est AA 46 (L) >60 ml/min/1.73m2    GFR est non-AA 38 (L) >60 ml/min/1.73m2    Calcium 8.9 8.5 - 10.1 mg/dL    Bilirubin, total 1.0 0.2 - 1.0 mg/dL    AST (SGOT) 36 15 - 37 U/L    ALT (SGPT) 17 12 - 78 U/L    Alk.  phosphatase 145 (H) 45 - 117 U/L    Protein, total 7.3 6.4 - 8.2 g/dL    Albumin 2.3 (L) 3.5 - 5.0 g/dL    Globulin 5.0 (H) 2.0 - 4.0 g/dL    A-G Ratio 0.5 (L) 1.1 - 2.2     TROPONIN-HIGH SENSITIVITY    Collection Time: 09/15/22  2:10 PM   Result Value Ref Range    Troponin-High Sensitivity 1,990 (HH) 0 - 76 ng/L   LACTIC ACID    Collection Time: 09/15/22  2:10 PM   Result Value Ref Range    Lactic acid 2.4 (HH) 0.4 - 2.0 mmol/L   PTT    Collection Time: 09/15/22  2:10 PM   Result Value Ref Range    aPTT 39.7 (H) 21.2 - 34.1 sec    aPTT, therapeutic range   82 - 109 sec   EKG, 12 LEAD, INITIAL    Collection Time: 09/15/22  2:35 PM   Result Value Ref Range    Ventricular Rate 101 BPM    Atrial Rate 101 BPM    P-R Interval 139 ms    QRS Duration 80 ms    Q-T Interval 424 ms    QTC Calculation (Bezet) 550 ms    Calculated P Axis 67 degrees    Calculated R Axis 44 degrees    Diagnosis       Sinus tachycardia  Ventricular premature complex  Low voltage, extremity leads  Prolonged QT interval  Baseline wander in lead(s) II,III,aVR,aVF     COVID-19 WITH INFLUENZA A/B    Collection Time: 09/15/22  3:16 PM   Result Value Ref Range    SARS-CoV-2 by PCR Not Detected Not Detected      Influenza A by PCR Not Detected Not Detected      Influenza B by PCR Not Detected Not Detected     TROPONIN-HIGH SENSITIVITY    Collection Time: 09/15/22  4:50 PM   Result Value Ref Range    Troponin-High Sensitivity 1,981 (HH) 0 - 76 ng/L   LACTIC ACID    Collection Time: 09/15/22  5:00 PM   Result Value Ref Range    Lactic acid 2.4 (HH) 0.4 - 2.0 mmol/L   URINALYSIS W/ RFLX MICROSCOPIC    Collection Time: 09/15/22  5:06 PM   Result Value Ref Range    Color Yellow/Straw      Appearance Hazy (A) Clear      Specific gravity >1.030 (H) 1.003 - 1.030    pH (UA) 5.5 5.0 - 8.0      Protein 30 (A) Negative mg/dL    Glucose Negative Negative mg/dL    Ketone Negative Negative mg/dL    Blood Negative Negative      Urobilinogen 0.2 0.2 - 1.0 EU/dL    Nitrites Negative Negative      Leukocyte Esterase Negative Negative     BILIRUBIN, CONFIRM    Collection Time: 09/15/22  5:06 PM   Result Value Ref Range    Bilirubin UA, confirm Positive (A) Negative     URINE MICROSCOPIC    Collection Time: 09/15/22  5:06 PM   Result Value Ref Range    WBC 0-4 0 - 5 /hpf    RBC 0-5 0 - 3 /hpf    Bacteria 2+ (A) Negative /hpf    Amorphous Crystals 1+ (A) Negative    Spermatozoa Present (A) Negative     CBC WITH AUTOMATED DIFF    Collection Time: 09/15/22  6:29 PM   Result Value Ref Range    WBC 14.6 (H) 4.1 - 11.1 K/uL    RBC 3.26 (L) 4.10 - 5.70 M/uL    HGB 9.1 (L) 12.1 - 17.0 g/dL    HCT 27.8 (L) 36.6 - 50.3 %    MCV 85.3 80.0 - 99.0 FL    MCH 27.9 26.0 - 34.0 PG    MCHC 32.7 30.0 - 36.5 g/dL    RDW 13.8 11.5 - 14.5 %    PLATELET 659 634 - 055 K/uL MPV 9.4 8.9 - 12.9 FL    NRBC 0.0 0.0  WBC    ABSOLUTE NRBC 0.00 0.00 - 0.01 K/uL    NEUTROPHILS 83 (H) 32 - 75 %    LYMPHOCYTES 8 (L) 12 - 49 %    MONOCYTES 7 5 - 13 %    EOSINOPHILS 0 0 - 7 %    BASOPHILS 0 0 - 1 %    IMMATURE GRANULOCYTES 2 (H) 0 - 0.5 %    ABS. NEUTROPHILS 12.2 (H) 1.8 - 8.0 K/UL    ABS. LYMPHOCYTES 1.1 0.8 - 3.5 K/UL    ABS. MONOCYTES 1.0 0.0 - 1.0 K/UL    ABS. EOSINOPHILS 0.0 0.0 - 0.4 K/UL    ABS. BASOPHILS 0.0 0.0 - 0.1 K/UL    ABS. IMM. GRANS. 0.3 (H) 0.00 - 0.04 K/UL    DF AUTOMATED     LACTIC ACID    Collection Time: 09/15/22  6:29 PM   Result Value Ref Range    Lactic acid 2.1 (HH) 0.4 - 2.0 mmol/L   TROPONIN-HIGH SENSITIVITY    Collection Time: 09/15/22  6:29 PM   Result Value Ref Range    Troponin-High Sensitivity 1,924 (HH) 0 - 76 ng/L       Radiologic Studies :   CT chest/ CT Abdomen/pelvis : IMPRESSION     1. Rapid progression of metastatic disease. 2. Metastatic pulmonary nodules are better imaged but grossly unchanged. 3. Progression of hepatic metastatic disease. New and increased size of hepatic  masses. 4. New splenic infarcts versus splenic masses. 5. Ill-defined pancreatic head mass. The CBD stent is in good position. CXR : IMPRESSION    Patchy diffuse mild lung infiltrates and interstitial prominence, new since  8/4/2022 at the level of the lung bases which were clear at that time. .  Correlate for infection versus developing edema. .      Assessment and Plan :     Persistent hypertension: Most likely intravascular depletion. Continue IV fluids, we will add Levophed. Admitted to ICU    Suspected infectious etiology: There is leukocytosis but most likely it is reactive. He is started on Zosyn in the emergency room which I will continue to monitor closely    Elevated troponin: Suggestive of non-ST elevation, most likely demand ischemia. No previous cardiac history. We will repeat troponin in the morning, continue IV heparin that was already started. Consultation requested from cardiology, will request for an echocardiogram.    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 Adult failure to thrive due to malignancy, will order dietary supplements see if that helps any. 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 He is already established at [facility], is going to have a Port-A-Cath soon for chemotherapy. But prognosis is very poor and guarded    Admitted to initially a medical telemetry but due to worsening of condition with no improvement change to ICU. He is full CODE STATUS, no advanced medical directives. He has children and spouse who are at bedside will make decisions in case if he cannot. Home medications reviewed with external Rx and verified with patient. CC : Zoe Wagner MD  Signed By: Chinedu San MD     September 15, 2022      This dictation was done by dragon, computer voice recognition software. Often unanticipated grammatical, syntax, [place] phones and other interpretive errors are inadvertently transcribed. Please excuse errors that have escaped final proofreading.

## 2022-09-15 NOTE — ED TRIAGE NOTES
Pt arrives to ED via [id number] from [facility]. Pt here for sepsis, NSTEMI, hypotension & metastatic disease.

## 2022-09-15 NOTE — ED PROVIDER NOTES
EMERGENCY DEPARTMENT HISTORY AND PHYSICAL EXAM      Date: 9/15/2022  Patient Name: Violette Nicole    History of Presenting Illness     Chief Complaint   Patient presents with    Syncope    Hypotension       History Provided By: Patient    HPI: Violette Nicole, 71 y.o. male with a history of diabetes, htn, hld, lung and pancreatic cancer who presents to the ed after syncop episode at home. He was noted at home to 'pass out' in his chair. No fall or head injury. EMS arrived to find pt was hypoxic (80s), tachycardic (reports of Afib with rate ) and hypotensive w hr 80/50. On arrival HR ws sinus tach to 120 and hypotension 80/50. Pt improving with IVF and HR improving also. States he feels weak and has some low back pain but no other complaints. No abdominal pain, no chest pain, sob cough, fevers or chills. There are no other complaints, changes, or physical findings at this time. PCP: Savannah Krishnan MD    Current Facility-Administered Medications   Medication Dose Route Frequency Provider Last Rate Last Admin    sodium chloride (NS) flush 5-10 mL  5-10 mL IntraVENous PRN Belia LAN MD        piperacillin-tazobactam (ZOSYN) 4.5 g in 0.9% sodium chloride (MBP/ADV) 100 mL MBP  4.5 g IntraVENous Q6H Belia Martino MD        levoFLOXacin (LEVAQUIN) 750 mg in D5W IVPB  750 mg IntraVENous Q24H Belia Martino MD   IV Completed at 09/15/22 1620    vancomycin (VANCOCIN) 1500 mg in SWFI 300 mL infusion  1,500 mg IntraVENous ONCE Belia Martino  mL/hr at 09/15/22 1657 1,500 mg at 09/15/22 1657    heparin 25,000 units in D5W 250 ml infusion  12-25 Units/kg/hr IntraVENous TITRATE Belia Martino MD 7.9 mL/hr at 09/15/22 1619 12 Units/kg/hr at 09/15/22 1619     Current Outpatient Medications   Medication Sig Dispense Refill    megestroL (MEGACE) 40 mg tablet Take 1 Tablet by mouth two (2) times a day.  60 Tablet 3    pantoprazole (PROTONIX) 40 mg tablet Take 1 Tablet by mouth daily. Indications: inflammation of the stomach lining caused by H. pylori 30 Tablet 3    famotidine (PEPCID) 40 mg tablet Take 1 Tablet by mouth daily. 30 Tablet 3    valsartan (DIOVAN) 320 mg tablet Take  by mouth daily. tamsulosin (FLOMAX) 0.4 mg capsule Take 0.4 mg by mouth daily. aspirin delayed-release 81 mg tablet Take  by mouth daily. amLODIPine (NORVASC) 10 mg tablet       rosuvastatin (CRESTOR) 40 mg tablet          Past History   Past Medical History:  Past Medical History:   Diagnosis Date    Diverticulosis 4/19/2022    Hypercholesterolemia     Hyperlipemia     Hypertension     Type II diabetes mellitus ([facility] [address] 4/19/2022    Weight loss 4/19/2022       Past Surgical History:  Past Surgical History:   Procedure Laterality Date    COLONOSCOPY  05/13/2015    colon screen    ENDOSCOPY VISIT-OUTPATIENT  04/20/2022       Family History:  Family History   Problem Relation Age of Onset    Stroke Mother     Stroke Father     Hypertension Other     High Cholesterol Other     Heart Disease Other        Social History:  Social History     Tobacco Use    Smoking status: Never    Smokeless tobacco: Never   Vaping Use    Vaping Use: Never used   Substance Use Topics    Alcohol use: Never    Drug use: Never       Allergies: Allergies   Allergen Reactions    Ace Inhibitors Unknown (comments)     Patient denies      Review of Systems   Review of Systems   Constitutional:  Positive for activity change, fatigue and fever. Negative for appetite change and chills. HENT:  Positive for congestion. Negative for rhinorrhea, sinus pressure and sore throat. Respiratory:  Positive for shortness of breath. Negative for cough, choking, chest tightness and wheezing. Cardiovascular:  Negative for chest pain, palpitations and leg swelling. Gastrointestinal:  Negative for abdominal pain, nausea and vomiting. Genitourinary:  Negative for difficulty urinating.    Musculoskeletal:  Negative for arthralgias, back pain, gait problem and neck pain. Skin:  Negative for rash and wound. Neurological:  Positive for weakness and light-headedness. Negative for dizziness, speech difficulty, numbness and headaches. Psychiatric/Behavioral:  Negative for confusion. Physical Exam   Physical Exam  Vitals and nursing note reviewed. Constitutional:       Appearance: Normal appearance. He is ill-appearing and toxic-appearing. HENT:      Head: Normocephalic and atraumatic. Right Ear: External ear normal.      Left Ear: External ear normal.      Nose: Nose normal.      Mouth/Throat:      Mouth: Mucous membranes are moist.   Eyes:      Conjunctiva/sclera: Conjunctivae normal.   Cardiovascular:      Rate and Rhythm: Normal rate and regular rhythm. Pulses: Normal pulses. Heart sounds: Normal heart sounds. Pulmonary:      Effort: Pulmonary effort is normal.      Breath sounds: Normal breath sounds. Abdominal:      Palpations: Abdomen is soft. There is no mass. Tenderness: There is no guarding. Musculoskeletal:         General: No swelling or tenderness. Cervical back: Normal range of motion. No tenderness. Skin:     General: Skin is warm and dry. Capillary Refill: Capillary refill takes less than 2 seconds. Neurological:      General: No focal deficit present. Mental Status: He is alert and oriented to person, place, and time. Sensory: No sensory deficit. Motor: No weakness. Comments: Slowed, responses to questionping, soft, weak responses. Psychiatric:         Mood and Affect: Mood normal.         Behavior: Behavior normal.         Thought Content:  Thought content normal.        Lab and Diagnostic Study Results   Labs -     Recent Results (from the past 12 hour(s))   CBC WITH AUTOMATED DIFF    Collection Time: 09/15/22  2:10 PM   Result Value Ref Range    WBC 19.9 (H) 4.4 - 11.3 K/uL    RBC 3.98 (L) 4.50 - 5.90 M/uL    HGB 11.1 (L) 13.5 - 17.5 g/dL HCT 33.7 (L) 41 - 53 %    MCV 84.8 80 - 100 FL    MCH 27.9 (L) 31 - 34 PG    MCHC 32.9 31.0 - 36.0 g/dL    RDW 14.7 (H) 11.5 - 14.5 %    PLATELET 968 612 - 210 K/uL    MPV 7.0 6.5 - 11.5 FL    NRBC 0.2  WBC    ABSOLUTE NRBC 0.04 K/uL    NEUTROPHILS 86 (H) 42 - 75 %    LYMPHOCYTES 6 (L) 20.5 - 51.1 %    MONOCYTES 8 1.7 - 9.3 %    EOSINOPHILS 0 (L) 0.9 - 2.9 %    BASOPHILS 0 0.0 - 2.5 %    ABS. NEUTROPHILS 17.2 (H) 1.8 - 7.7 K/UL    ABS. LYMPHOCYTES 1.2 1.0 - 4.8 K/UL    ABS. MONOCYTES 1.5 0.2 - 2.4 K/UL    ABS. EOSINOPHILS 0.0 0.0 - 0.7 K/UL    ABS. BASOPHILS 0.0 0.0 - 0.2 K/UL   METABOLIC PANEL, COMPREHENSIVE    Collection Time: 09/15/22  2:10 PM   Result Value Ref Range    Sodium 135 (L) 136 - 145 mmol/L    Potassium 3.7 3.5 - 5.1 mmol/L    Chloride 98 97 - 108 mmol/L    CO2 26 21 - 32 mmol/L    Anion gap 11 5 - 15 mmol/L    Glucose 137 (H) 65 - 100 mg/dL    BUN 22 (H) 6 - 20 mg/dL    Creatinine 1.77 (H) 0.70 - 1.30 mg/dL    BUN/Creatinine ratio 12 12 - 20      GFR est AA 46 (L) >60 ml/min/1.73m2    GFR est non-AA 38 (L) >60 ml/min/1.73m2    Calcium 8.9 8.5 - 10.1 mg/dL    Bilirubin, total 1.0 0.2 - 1.0 mg/dL    AST (SGOT) 36 15 - 37 U/L    ALT (SGPT) 17 12 - 78 U/L    Alk.  phosphatase 145 (H) 45 - 117 U/L    Protein, total 7.3 6.4 - 8.2 g/dL    Albumin 2.3 (L) 3.5 - 5.0 g/dL    Globulin 5.0 (H) 2.0 - 4.0 g/dL    A-G Ratio 0.5 (L) 1.1 - 2.2     TROPONIN-HIGH SENSITIVITY    Collection Time: 09/15/22  2:10 PM   Result Value Ref Range    Troponin-High Sensitivity 1,990 (HH) 0 - 76 ng/L   LACTIC ACID    Collection Time: 09/15/22  2:10 PM   Result Value Ref Range    Lactic acid 2.4 (HH) 0.4 - 2.0 mmol/L   PTT    Collection Time: 09/15/22  2:10 PM   Result Value Ref Range    aPTT 39.7 (H) 21.2 - 34.1 sec    aPTT, therapeutic range   82 - 109 sec   EKG, 12 LEAD, INITIAL    Collection Time: 09/15/22  2:35 PM   Result Value Ref Range    Ventricular Rate 101 BPM    Atrial Rate 101 BPM    P-R Interval 139 ms    QRS Duration 80 ms    Q-T Interval 424 ms    QTC Calculation (Bezet) 550 ms    Calculated P Axis 67 degrees    Calculated R Axis 44 degrees    Diagnosis       Sinus tachycardia  Ventricular premature complex  Low voltage, extremity leads  Prolonged QT interval  Baseline wander in lead(s) II,III,aVR,aVF     COVID-19 WITH INFLUENZA A/B    Collection Time: 09/15/22  3:16 PM   Result Value Ref Range    SARS-CoV-2 by PCR Not Detected Not Detected      Influenza A by PCR Not Detected Not Detected      Influenza B by PCR Not Detected Not Detected         Medical Decision Making and ED Course   Differential Diagnosis & Medical Decision Making Provider Note:   PT arrives to ed with s/sxs of sepsis pna on cxr, time of sepsis infection source 1509, Treating with 30cc/kg fluid, broad specturm abx, lactate 2.7 repeat pending as of 5:10 PMthe patient is improving clinically. Heparin added for trop of 1900s, likely demand ischemia as no acute ischemia on EKG and no chest pain. Cts of the chest and abdomen pending. Also with brett.     5:08 PM repeat exam, good skin turgor    - I am the first and primary provider for this patient. I reviewed the vital signs, available nursing notes, past medical history, past surgical history, family history and social history. The patient's presenting problems have been discussed, and the staff are in agreement with the care plan formulated and outlined with them. I have encouraged them to ask questions as they arise throughout their visit. Vital Signs-Reviewed the patient's vital signs. Patient Vitals for the past 12 hrs:   Temp Pulse Resp BP SpO2   09/15/22 1615 -- 99 16 114/72 98 %   09/15/22 1545 -- (!) 106 24 111/63 98 %   09/15/22 1530 -- 85 18 96/64 98 %   09/15/22 1515 -- 95 28 (!) 97/56 94 %   09/15/22 1500 -- (!) 102 16 (!) 95/57 94 %   09/15/22 1435 97.9 °F (36.6 °C) (!) 115 26 (!) 87/55 93 %       EKG interpretation: (Preliminary): EKG Interpreted by me. Shows no stemi. PVCs noted.  HR 101. [id number]. ED Course:           Procedures and Critical Care     Performed by: Suyapa Simpson MD  Procedures       CRITICAL CARE NOTE :    2:44 PM    IMPENDING DETERIORATION -Airway, Respiratory, Cardiovascular, and Metabolic  ASSOCIATED RISK FACTORS - Hypotension, Shock, Hypoxia, Dysrhythmia, Metabolic changes, and Dehydration  MANAGEMENT- Bedside Assessment, Supervision of Care, and Transfer  INTERPRETATION -  Xrays, CT Scan, ECG, Blood Pressure, and Cardiac Output Measures   INTERVENTIONS - hemodynamic mngmt, vascular control, and Metobolic interventions  CASE REVIEW - Hospitalist/Intensivist, Nursing, and Family  TREATMENT RESPONSE -Improved  PERFORMED BY - Self    NOTES   :  I have spent 40 minutes of critical care time involved in lab review, consultations with specialist, family decision- making, bedside attention and documentation. This time excludes time spent in any separate billed procedures. During this entire length of time I was immediately available to the patient . Suyapa Simpson MD    Disposition   Disposition: Transferred to [facility] patient verbally agreed to transfer and understand the risks involved as outlined in the EMTALA form. Pt and family requested [facility] or [facility] but they josé not open to transfers at this time as patient will benefit most from ICU obseravation at this time. Diagnosis/Clinical Impression     Clinical Impression:   1. Sepsis, due to unspecified organism, unspecified whether acute organ dysfunction present ([facility] [address]    2. Community acquired pneumonia, unspecified laterality    3. NSTEMI (non-ST elevated myocardial infarction) [facility])        Attestations: Dennie Pedro, MD, am the primary clinician of record. Please note that this dictation was completed with [employer], the computer voice recognition software.   Quite often unanticipated grammatical, syntax, homophones, and other interpretive errors are inadvertently transcribed by the computer software. Please disregard these errors. Please excuse any errors that have escaped final proofreading. Thank you.

## 2022-09-16 ENCOUNTER — APPOINTMENT (OUTPATIENT)
Dept: CT IMAGING | Age: 72
DRG: 871 | End: 2022-09-16
Attending: STUDENT IN AN ORGANIZED HEALTH CARE EDUCATION/TRAINING PROGRAM
Payer: MEDICARE

## 2022-09-16 ENCOUNTER — APPOINTMENT (OUTPATIENT)
Dept: NON INVASIVE DIAGNOSTICS | Age: 72
DRG: 871 | End: 2022-09-16
Attending: NURSE PRACTITIONER
Payer: MEDICARE

## 2022-09-16 ENCOUNTER — APPOINTMENT (OUTPATIENT)
Dept: NON INVASIVE DIAGNOSTICS | Age: 72
DRG: 871 | End: 2022-09-16
Attending: HOSPITALIST
Payer: MEDICARE

## 2022-09-16 LAB
ALBUMIN SERPL-MCNC: 1.8 G/DL (ref 3.5–5)
ANION GAP SERPL CALC-SCNC: 6 MMOL/L (ref 5–15)
APTT PPP: 48.9 SEC (ref 21.2–34.1)
APTT PPP: 57.3 SEC (ref 21.2–34.1)
APTT PPP: 66.5 SEC (ref 21.2–34.1)
APTT PPP: 67.9 SEC (ref 21.2–34.1)
ATRIAL RATE: 101 BPM
BASOPHILS # BLD: 0 K/UL (ref 0–0.1)
BASOPHILS NFR BLD: 0 % (ref 0–1)
BUN SERPL-MCNC: 21 MG/DL (ref 6–20)
BUN/CREAT SERPL: 18 (ref 12–20)
CA-I BLD-MCNC: 7.7 MG/DL (ref 8.5–10.1)
CALCULATED P AXIS, ECG09: 67 DEGREES
CALCULATED R AXIS, ECG10: 44 DEGREES
CHLORIDE SERPL-SCNC: 105 MMOL/L (ref 97–108)
CO2 SERPL-SCNC: 24 MMOL/L (ref 21–32)
CREAT SERPL-MCNC: 1.2 MG/DL (ref 0.7–1.3)
CRP SERPL-MCNC: 16.9 MG/DL (ref 0–0.6)
DIAGNOSIS, 93000: NORMAL
DIFFERENTIAL METHOD BLD: ABNORMAL
ECHO AO ASC DIAM: 2.4 CM
ECHO AO ASCENDING AORTA INDEX: 1.38 CM/M2
ECHO AO DESC DIAM: 2.1 CM
ECHO AO DESCENDING AORTA INDEX: 1.21 CM/M2
ECHO AO ROOT DIAM: 3.7 CM
ECHO AO ROOT INDEX: 2.13 CM/M2
ECHO AV AREA PEAK VELOCITY: 2.3 CM2
ECHO AV AREA/BSA PEAK VELOCITY: 1.3 CM2/M2
ECHO AV PEAK GRADIENT: 6 MMHG
ECHO AV PEAK VELOCITY: 1.3 M/S
ECHO AV VELOCITY RATIO: 0.69
ECHO EST RA PRESSURE: 15 MMHG
ECHO IVC PROX: 2.6 CM
ECHO LA AREA 4C: 21.9 CM2
ECHO LA DIAMETER INDEX: 2.3 CM/M2
ECHO LA DIAMETER: 4 CM
ECHO LA MAJOR AXIS: 6.4 CM
ECHO LA TO AORTIC ROOT RATIO: 1.08
ECHO LV EDV A4C: 55 ML
ECHO LV EDV INDEX A4C: 32 ML/M2
ECHO LV EJECTION FRACTION A2C: 58 %
ECHO LV EJECTION FRACTION A4C: 62 %
ECHO LV ESV A4C: 21 ML
ECHO LV ESV INDEX A4C: 12 ML/M2
ECHO LV FRACTIONAL SHORTENING: 31 % (ref 28–44)
ECHO LV INTERNAL DIMENSION DIASTOLE INDEX: 2.82 CM/M2
ECHO LV INTERNAL DIMENSION DIASTOLIC: 4.9 CM (ref 4.2–5.9)
ECHO LV INTERNAL DIMENSION SYSTOLIC INDEX: 1.95 CM/M2
ECHO LV INTERNAL DIMENSION SYSTOLIC: 3.4 CM
ECHO LV IVSD: 1.1 CM (ref 0.6–1)
ECHO LV MASS 2D: 176 G (ref 88–224)
ECHO LV MASS INDEX 2D: 101.2 G/M2 (ref 49–115)
ECHO LV POSTERIOR WALL DIASTOLIC: 0.9 CM (ref 0.6–1)
ECHO LV RELATIVE WALL THICKNESS RATIO: 0.37
ECHO LVOT AREA: 3.1 CM2
ECHO LVOT DIAM: 2 CM
ECHO LVOT PEAK GRADIENT: 3 MMHG
ECHO LVOT PEAK VELOCITY: 0.9 M/S
ECHO MV A VELOCITY: 0.73 M/S
ECHO MV E DECELERATION TIME (DT): 148 MS
ECHO MV E VELOCITY: 0.38 M/S
ECHO MV E/A RATIO: 0.52
ECHO MV MAX VELOCITY: 1.2 M/S
ECHO MV MEAN GRADIENT: 2 MMHG
ECHO MV MEAN VELOCITY: 0.6 M/S
ECHO MV PEAK GRADIENT: 6 MMHG
ECHO MV REGURGITANT PEAK GRADIENT: 71 MMHG
ECHO MV REGURGITANT PEAK VELOCITY: 4.2 M/S
ECHO MV VTI: 24.4 CM
ECHO RIGHT VENTRICULAR SYSTOLIC PRESSURE (RVSP): 23 MMHG
ECHO RV TAPSE: 1.6 CM (ref 1.7–?)
ECHO TV REGURGITANT MAX VELOCITY: 1.43 M/S
ECHO TV REGURGITANT PEAK GRADIENT: 8 MMHG
EOSINOPHIL # BLD: 0 K/UL (ref 0–0.4)
EOSINOPHIL NFR BLD: 0 % (ref 0–7)
ERYTHROCYTE [DISTWIDTH] IN BLOOD BY AUTOMATED COUNT: 14 % (ref 11.5–14.5)
GLUCOSE SERPL-MCNC: 253 MG/DL (ref 65–100)
HCT VFR BLD AUTO: 28 % (ref 36.6–50.3)
HGB BLD-MCNC: 9.3 G/DL (ref 12.1–17)
IMM GRANULOCYTES # BLD AUTO: 0.4 K/UL (ref 0–0.04)
IMM GRANULOCYTES NFR BLD AUTO: 2 % (ref 0–0.5)
LYMPHOCYTES # BLD: 1.5 K/UL (ref 0.8–3.5)
LYMPHOCYTES NFR BLD: 9 % (ref 12–49)
MCH RBC QN AUTO: 28.3 PG (ref 26–34)
MCHC RBC AUTO-ENTMCNC: 33.2 G/DL (ref 30–36.5)
MCV RBC AUTO: 85.1 FL (ref 80–99)
MONOCYTES # BLD: 1.5 K/UL (ref 0–1)
MONOCYTES NFR BLD: 9 % (ref 5–13)
NEUTS SEG # BLD: 12.9 K/UL (ref 1.8–8)
NEUTS SEG NFR BLD: 80 % (ref 32–75)
NRBC # BLD: 0 K/UL (ref 0–0.01)
NRBC BLD-RTO: 0 PER 100 WBC
P-R INTERVAL, ECG05: 139 MS
PHOSPHATE SERPL-MCNC: 2.8 MG/DL (ref 2.6–4.7)
PLATELET # BLD AUTO: 356 K/UL (ref 150–400)
PMV BLD AUTO: 9.6 FL (ref 8.9–12.9)
POTASSIUM SERPL-SCNC: 4 MMOL/L (ref 3.5–5.1)
PROCALCITONIN SERPL-MCNC: 1.54 NG/ML
Q-T INTERVAL, ECG07: 424 MS
QRS DURATION, ECG06: 80 MS
QTC CALCULATION (BEZET), ECG08: 550 MS
RBC # BLD AUTO: 3.29 M/UL (ref 4.1–5.7)
SODIUM SERPL-SCNC: 135 MMOL/L (ref 136–145)
THERAPEUTIC RANGE,PTTT: ABNORMAL SEC
THERAPEUTIC RANGE,PTTT: ABNORMAL SEC (ref 82–109)
TROPONIN-HIGH SENSITIVITY: 1728 NG/L (ref 0–76)
VENTRICULAR RATE, ECG03: 101 BPM
WBC # BLD AUTO: 16.2 K/UL (ref 4.1–11.1)

## 2022-09-16 PROCEDURE — 96361 HYDRATE IV INFUSION ADD-ON: CPT

## 2022-09-16 PROCEDURE — 96376 TX/PRO/DX INJ SAME DRUG ADON: CPT

## 2022-09-16 PROCEDURE — 74011250636 HC RX REV CODE- 250/636: Performed by: STUDENT IN AN ORGANIZED HEALTH CARE EDUCATION/TRAINING PROGRAM

## 2022-09-16 PROCEDURE — 85730 THROMBOPLASTIN TIME PARTIAL: CPT

## 2022-09-16 PROCEDURE — 74011250636 HC RX REV CODE- 250/636: Performed by: EMERGENCY MEDICINE

## 2022-09-16 PROCEDURE — 74011250636 HC RX REV CODE- 250/636: Performed by: HOSPITALIST

## 2022-09-16 PROCEDURE — 80069 RENAL FUNCTION PANEL: CPT

## 2022-09-16 PROCEDURE — 85025 COMPLETE CBC W/AUTO DIFF WBC: CPT

## 2022-09-16 PROCEDURE — 71275 CT ANGIOGRAPHY CHEST: CPT

## 2022-09-16 PROCEDURE — 93306 TTE W/DOPPLER COMPLETE: CPT

## 2022-09-16 PROCEDURE — 99223 1ST HOSP IP/OBS HIGH 75: CPT | Performed by: INTERNAL MEDICINE

## 2022-09-16 PROCEDURE — 36415 COLL VENOUS BLD VENIPUNCTURE: CPT

## 2022-09-16 PROCEDURE — 74011000258 HC RX REV CODE- 258: Performed by: HOSPITALIST

## 2022-09-16 PROCEDURE — 74011250637 HC RX REV CODE- 250/637: Performed by: HOSPITALIST

## 2022-09-16 PROCEDURE — 96368 THER/DIAG CONCURRENT INF: CPT

## 2022-09-16 PROCEDURE — 96366 THER/PROPH/DIAG IV INF ADDON: CPT

## 2022-09-16 PROCEDURE — 74011000636 HC RX REV CODE- 636: Performed by: INTERNAL MEDICINE

## 2022-09-16 PROCEDURE — 93970 EXTREMITY STUDY: CPT

## 2022-09-16 PROCEDURE — 84484 ASSAY OF TROPONIN QUANT: CPT

## 2022-09-16 PROCEDURE — 84145 PROCALCITONIN (PCT): CPT

## 2022-09-16 PROCEDURE — 77010033678 HC OXYGEN DAILY

## 2022-09-16 PROCEDURE — 96375 TX/PRO/DX INJ NEW DRUG ADDON: CPT

## 2022-09-16 PROCEDURE — 65270000029 HC RM PRIVATE

## 2022-09-16 PROCEDURE — 74011000250 HC RX REV CODE- 250: Performed by: HOSPITALIST

## 2022-09-16 PROCEDURE — 86140 C-REACTIVE PROTEIN: CPT

## 2022-09-16 RX ORDER — TRAMADOL HYDROCHLORIDE 50 MG/1
50 TABLET ORAL
Status: DISCONTINUED | OUTPATIENT
Start: 2022-09-16 | End: 2022-09-22 | Stop reason: HOSPADM

## 2022-09-16 RX ORDER — HYDRALAZINE HYDROCHLORIDE 20 MG/ML
10 INJECTION INTRAMUSCULAR; INTRAVENOUS
Status: DISCONTINUED | OUTPATIENT
Start: 2022-09-16 | End: 2022-09-22 | Stop reason: HOSPADM

## 2022-09-16 RX ORDER — OXYCODONE AND ACETAMINOPHEN 5; 325 MG/1; MG/1
1 TABLET ORAL
Status: DISCONTINUED | OUTPATIENT
Start: 2022-09-16 | End: 2022-09-22 | Stop reason: HOSPADM

## 2022-09-16 RX ORDER — MORPHINE SULFATE 2 MG/ML
1 INJECTION, SOLUTION INTRAMUSCULAR; INTRAVENOUS
Status: ACTIVE | OUTPATIENT
Start: 2022-09-16 | End: 2022-09-19

## 2022-09-16 RX ORDER — SODIUM CHLORIDE, SODIUM LACTATE, POTASSIUM CHLORIDE, CALCIUM CHLORIDE 600; 310; 30; 20 MG/100ML; MG/100ML; MG/100ML; MG/100ML
125 INJECTION, SOLUTION INTRAVENOUS CONTINUOUS
Status: DISCONTINUED | OUTPATIENT
Start: 2022-09-16 | End: 2022-09-16

## 2022-09-16 RX ORDER — SODIUM CHLORIDE 9 MG/ML
50 INJECTION, SOLUTION INTRAVENOUS CONTINUOUS
Status: DISCONTINUED | OUTPATIENT
Start: 2022-09-16 | End: 2022-09-16

## 2022-09-16 RX ADMIN — Medication 6 MCG/MIN: at 03:59

## 2022-09-16 RX ADMIN — PIPERACILLIN AND TAZOBACTAM 3.38 G: 3; .375 INJECTION, POWDER, LYOPHILIZED, FOR SOLUTION INTRAVENOUS at 03:30

## 2022-09-16 RX ADMIN — HEPARIN SODIUM 14 UNITS/KG/HR: 10000 INJECTION, SOLUTION INTRAVENOUS at 03:56

## 2022-09-16 RX ADMIN — ACETAMINOPHEN 650 MG: 325 TABLET, FILM COATED ORAL at 06:46

## 2022-09-16 RX ADMIN — HEPARIN SODIUM 1960 UNITS: 1000 INJECTION, SOLUTION INTRAVENOUS; SUBCUTANEOUS at 22:01

## 2022-09-16 RX ADMIN — PANTOPRAZOLE SODIUM 40 MG: 20 TABLET, DELAYED RELEASE ORAL at 10:48

## 2022-09-16 RX ADMIN — MIRTAZAPINE 15 MG: 15 TABLET, FILM COATED ORAL at 21:52

## 2022-09-16 RX ADMIN — SODIUM CHLORIDE 125 ML/HR: 9 INJECTION, SOLUTION INTRAVENOUS at 11:57

## 2022-09-16 RX ADMIN — Medication 7 MCG/MIN: at 03:30

## 2022-09-16 RX ADMIN — Medication 8 MCG/MIN: at 02:46

## 2022-09-16 RX ADMIN — MEGESTROL ACETATE 40 MG: 20 TABLET ORAL at 10:46

## 2022-09-16 RX ADMIN — MEGESTROL ACETATE 40 MG: 20 TABLET ORAL at 21:52

## 2022-09-16 RX ADMIN — PIPERACILLIN AND TAZOBACTAM 3.38 G: 3; .375 INJECTION, POWDER, LYOPHILIZED, FOR SOLUTION INTRAVENOUS at 11:24

## 2022-09-16 RX ADMIN — HEPARIN SODIUM 1960 UNITS: 1000 INJECTION, SOLUTION INTRAVENOUS; SUBCUTANEOUS at 16:50

## 2022-09-16 RX ADMIN — POTASSIUM CHLORIDE, DEXTROSE MONOHYDRATE AND SODIUM CHLORIDE 125 ML/HR: 150; 5; 900 INJECTION, SOLUTION INTRAVENOUS at 05:51

## 2022-09-16 RX ADMIN — HEPARIN SODIUM 1960 UNITS: 1000 INJECTION, SOLUTION INTRAVENOUS; SUBCUTANEOUS at 02:41

## 2022-09-16 RX ADMIN — Medication 5 MCG/MIN: at 04:37

## 2022-09-16 RX ADMIN — ASPIRIN 81 MG: 81 TABLET, COATED ORAL at 10:46

## 2022-09-16 RX ADMIN — HEPARIN SODIUM 14 UNITS/KG/HR: 10000 INJECTION, SOLUTION INTRAVENOUS at 02:43

## 2022-09-16 RX ADMIN — PIPERACILLIN AND TAZOBACTAM 3.38 G: 3; .375 INJECTION, POWDER, LYOPHILIZED, FOR SOLUTION INTRAVENOUS at 20:43

## 2022-09-16 RX ADMIN — HEPARIN SODIUM 1960 UNITS: 1000 INJECTION, SOLUTION INTRAVENOUS; SUBCUTANEOUS at 08:12

## 2022-09-16 RX ADMIN — IOPAMIDOL 100 ML: 755 INJECTION, SOLUTION INTRAVENOUS at 15:01

## 2022-09-16 RX ADMIN — SODIUM CHLORIDE, PRESERVATIVE FREE 10 ML: 5 INJECTION INTRAVENOUS at 22:04

## 2022-09-16 NOTE — CONSULTS
CONSULTATION    REASON FOR CONSULT:  Elevated troponin    REQUESTING PROVIDER:  see chart    CHIEF COMPLAINT:    Chief Complaint   Patient presents with    Transfer Of Care         HISTORY OF PRESENT ILLNESS:  Zhang Trevino is a 71y.o. year-old male with past medical history significant for hypertension, hyperlipidemia, DM2, GERD, and pancreatic cancer with metastatic disease who was admitted to the hospital for further evaluation after a syncopal episode at home. Patient was seen and examined in the ED, he is awake and alert. No acute distress. Patient denies chest pain or shortness of breath. He complains of worsening fatigue and back pain. Patient reports left calf pain a few days ago. Troponin peaked at [address], currently 1278. Heparin gtt was started. Patient was initially hypotensive, receiving IV fluids with some improvement. Records from hospital admission course thus far reviewed. Telemetry Review: Sinus tachycardia: HR 90-100s. PAST MEDICAL HISTORY:    Past Medical History:   Diagnosis Date    Diverticulosis 4/19/2022    Hypercholesterolemia     Hyperlipemia     Hypertension     Type II diabetes mellitus ([facility] [address] 4/19/2022    Weight loss 4/19/2022       PAST SURGICAL HISTORY:   Past Surgical History:   Procedure Laterality Date    COLONOSCOPY  05/13/2015    colon screen    ENDOSCOPY VISIT-OUTPATIENT  04/20/2022       ALLERGIES:  No Known Allergies    FAMILY HISTORY:    Family History   Problem Relation Age of Onset    Stroke Mother     Stroke Father     Hypertension Other     High Cholesterol Other     Heart Disease Other        SOCIAL HISTORY:    Tobacco: Never smoker  Drugs: Not reviewed  ETOH: Not reviewed    HOME MEDICATIONS:    Prior to Admission Medications   Prescriptions Last Dose Informant Patient Reported? Taking? amLODIPine (NORVASC) 10 mg tablet  Child Yes Yes   Sig: Take 10 mg by mouth daily.    aspirin delayed-release 81 mg tablet  Child Yes Yes   Sig: Take 81 mg by mouth daily. mirtazapine (REMERON) 15 mg tablet  Child Yes Yes   Sig: Take 15 mg by mouth nightly. omeprazole (PRILOSEC) 40 mg capsule  Child Yes Yes   Sig: Take 40 mg by mouth Daily (before breakfast). oxyCODONE IR (ROXICODONE) 5 mg immediate release tablet  Child Yes Yes   Sig: Take 5 mg by mouth every six (6) hours as needed for Severe Pain. rosuvastatin (CRESTOR) 40 mg tablet  Child Yes Yes   Sig: Take 40 mg by mouth daily. tamsulosin (FLOMAX) 0.4 mg capsule  Child Yes Yes   Sig: Take 0.4 mg by mouth daily. valsartan (DIOVAN) 320 mg tablet  Child Yes Yes   Sig: Take 320 mg by mouth daily. Facility-Administered Medications: None       REVIEW OF SYSTEMS:  Complete review of systems performed, pertinents noted above, all other systems are negative.     Patient Vitals for the past 24 hrs:   Temp Pulse Resp BP SpO2   09/16/22 1328 -- 96 27 107/64 98 %   09/16/22 1300 -- -- 26 104/65 97 %   09/16/22 1230 -- 94 27 114/64 96 %   09/16/22 1113 97.8 °F (36.6 °C) 98 (!) 32 120/72 100 %   09/16/22 1030 -- 98 30 (!) 107/51 98 %   09/16/22 0930 -- (!) 103 19 105/69 100 %   09/16/22 0830 -- (!) 102 28 111/62 97 %   09/16/22 0801 -- (!) 103 (!) 31 118/63 97 %   09/16/22 0701 -- (!) 103 29 107/65 97 %   09/16/22 0630 -- 99 28 111/62 98 %   09/16/22 0605 -- (!) 102 28 112/65 99 %   09/16/22 0531 -- 95 24 122/67 98 %   09/16/22 0501 -- 93 28 108/60 98 %   09/16/22 0435 -- 91 25 121/71 100 %   09/16/22 0405 -- 89 23 114/67 98 %   09/16/22 0401 -- 92 26 -- 98 %   09/16/22 0400 -- 92 20 114/67 97 %   09/16/22 0350 -- 87 22 123/68 98 %   09/16/22 0346 -- 87 22 -- 98 %   09/16/22 0335 -- 88 24 118/66 97 %   09/16/22 0331 -- 88 23 -- 98 %   09/16/22 0320 -- 86 23 120/67 99 %   09/16/22 0316 -- 87 22 -- 99 %   09/16/22 0305 -- 87 24 113/64 98 %   09/16/22 0301 -- 86 21 -- 99 %   09/16/22 0250 -- 85 22 118/68 98 %   09/16/22 0246 -- 82 22 -- 99 %   09/16/22 0235 -- 84 21 116/64 100 %   09/16/22 0231 -- 83 23 -- 99 % 09/16/22 0220 -- 83 21 -- 100 %   09/16/22 0216 -- 81 20 -- 99 %   09/16/22 0214 -- 81 20 116/64 99 %   09/16/22 0205 -- 81 20 -- 99 %   09/16/22 0201 -- 82 21 -- 99 %   09/16/22 0159 -- 82 20 119/70 99 %   09/16/22 0150 -- (!) 115 24 -- 100 %   09/16/22 0146 -- 96 (!) 32 -- 100 %   09/16/22 0139 -- 84 23 (!) 143/71 98 %   09/16/22 0135 -- 85 24 -- 98 %   09/16/22 0132 -- -- -- (!) 109/55 --   09/16/22 0120 -- 85 22 -- 99 %   09/16/22 0117 -- 88 21 (!) 100/55 99 %   09/16/22 0105 -- 83 19 -- 99 %   09/16/22 0102 -- 81 20 99/60 97 %   09/16/22 0050 -- 82 19 -- 98 %   09/16/22 0047 -- 82 20 (!) 97/56 97 %   09/16/22 0035 -- 85 19 -- 97 %   09/16/22 0032 -- 83 19 (!) 105/54 98 %   09/16/22 0017 -- 82 17 -- 98 %   09/16/22 0008 -- 81 18 (!) 92/59 96 %   09/16/22 0002 -- 83 19 -- 96 %   09/15/22 2353 -- 82 18 (!) 94/59 98 %   09/15/22 2347 -- 82 18 -- 95 %   09/15/22 2338 -- 88 18 (!) 106/53 98 %   09/15/22 2332 -- 80 18 -- 98 %   09/15/22 2323 -- 80 18 (!) 101/55 97 %   09/15/22 2317 -- 85 22 -- 97 %   09/15/22 2308 -- 80 16 (!) 103/57 99 %   09/15/22 2302 -- 83 16 -- 98 %   09/15/22 2256 -- 83 17 (!) 102/57 97 %   09/15/22 2247 -- 79 18 -- 100 %   09/15/22 2241 -- 80 18 (!) 102/56 99 %   09/15/22 2232 -- 82 20 -- 99 %   09/15/22 2230 -- 81 20 (!) 92/59 99 %   09/15/22 2217 -- 88 23 -- 99 %   09/15/22 2215 -- 88 17 (!) 78/48 99 %   09/15/22 2213 -- 94 22 (!) 79/47 99 %   09/15/22 2202 -- 91 21 -- 99 %   09/15/22 2200 -- 87 19 (!) 80/48 99 %   09/15/22 2147 -- 89 21 -- 100 %   09/15/22 2145 -- 89 19 (!) 88/55 100 %   09/15/22 2132 -- 99 22 (!) 86/59 100 %   09/15/22 2114 98.2 °F (36.8 °C) 90 18 (!) 85/52 100 %   09/15/22 2030 -- 86 16 (!) 84/51 100 %   09/15/22 2000 -- 88 17 (!) 92/53 97 %   09/15/22 1900 -- 94 18 (!) 82/58 99 %   09/15/22 1845 -- 96 17 (!) 90/59 100 %   09/15/22 1838 -- -- -- -- 100 %   09/15/22 1830 -- 96 20 (!) 84/57 100 %   09/15/22 1813 98 °F (36.7 °C) 99 23 93/62 100 %       PHYSICAL EXAMINATION: General:  NAD, A&O  HEENT: Normocephalic, PERRL, no drainage  Neck: Supple, Trachea midline, No JVD  RESP: CTA bilaterally. + Symmetrical chest movement. No SOB or distress. On O2 per protocol  Cardiovascular: RRR no MRG  PVS: No rubor, cyanosis, no edema  ABD: soft, NT, Normoactive BS  Derm: Warm/Dry/Intact with no lesions,  Neuro: A&O PPTS, . No focal deficits  PSYCH: No anxiety or agitation    Recent labs results and imaging reviewed. Recent Results (from the past 24 hour(s))   CBC WITH AUTOMATED DIFF    Collection Time: 09/15/22  2:10 PM   Result Value Ref Range    WBC 19.9 (H) 4.4 - 11.3 K/uL    RBC 3.98 (L) 4.50 - 5.90 M/uL    HGB 11.1 (L) 13.5 - 17.5 g/dL    HCT 33.7 (L) 41 - 53 %    MCV 84.8 80 - 100 FL    MCH 27.9 (L) 31 - 34 PG    MCHC 32.9 31.0 - 36.0 g/dL    RDW 14.7 (H) 11.5 - 14.5 %    PLATELET 483 572 - 534 K/uL    MPV 7.0 6.5 - 11.5 FL    NRBC 0.2  WBC    ABSOLUTE NRBC 0.04 K/uL    NEUTROPHILS 86 (H) 42 - 75 %    LYMPHOCYTES 6 (L) 20.5 - 51.1 %    MONOCYTES 8 1.7 - 9.3 %    EOSINOPHILS 0 (L) 0.9 - 2.9 %    BASOPHILS 0 0.0 - 2.5 %    ABS. NEUTROPHILS 17.2 (H) 1.8 - 7.7 K/UL    ABS. LYMPHOCYTES 1.2 1.0 - 4.8 K/UL    ABS. MONOCYTES 1.5 0.2 - 2.4 K/UL    ABS. EOSINOPHILS 0.0 0.0 - 0.7 K/UL    ABS. BASOPHILS 0.0 0.0 - 0.2 K/UL   METABOLIC PANEL, COMPREHENSIVE    Collection Time: 09/15/22  2:10 PM   Result Value Ref Range    Sodium 135 (L) 136 - 145 mmol/L    Potassium 3.7 3.5 - 5.1 mmol/L    Chloride 98 97 - 108 mmol/L    CO2 26 21 - 32 mmol/L    Anion gap 11 5 - 15 mmol/L    Glucose 137 (H) 65 - 100 mg/dL    BUN 22 (H) 6 - 20 mg/dL    Creatinine 1.77 (H) 0.70 - 1.30 mg/dL    BUN/Creatinine ratio 12 12 - 20      GFR est AA 46 (L) >60 ml/min/1.73m2    GFR est non-AA 38 (L) >60 ml/min/1.73m2    Calcium 8.9 8.5 - 10.1 mg/dL    Bilirubin, total 1.0 0.2 - 1.0 mg/dL    AST (SGOT) 36 15 - 37 U/L    ALT (SGPT) 17 12 - 78 U/L    Alk.  phosphatase 145 (H) 45 - 117 U/L    Protein, total 7.3 6.4 - 8.2 g/dL    Albumin 2.3 (L) 3.5 - 5.0 g/dL    Globulin 5.0 (H) 2.0 - 4.0 g/dL    A-G Ratio 0.5 (L) 1.1 - 2.2     TROPONIN-HIGH SENSITIVITY    Collection Time: 09/15/22  2:10 PM   Result Value Ref Range    Troponin-High Sensitivity 1,990 (HH) 0 - 76 ng/L   LACTIC ACID    Collection Time: 09/15/22  2:10 PM   Result Value Ref Range    Lactic acid 2.4 (HH) 0.4 - 2.0 mmol/L   PTT    Collection Time: 09/15/22  2:10 PM   Result Value Ref Range    aPTT 39.7 (H) 21.2 - 34.1 sec    aPTT, therapeutic range   82 - 109 sec   EKG, 12 LEAD, INITIAL    Collection Time: 09/15/22  2:35 PM   Result Value Ref Range    Ventricular Rate 101 BPM    Atrial Rate 101 BPM    P-R Interval 139 ms    QRS Duration 80 ms    Q-T Interval 424 ms    QTC Calculation (Bezet) 550 ms    Calculated P Axis 67 degrees    Calculated R Axis 44 degrees    Diagnosis       Sinus tachycardia  Ventricular premature complex  Low voltage, extremity leads  Prolonged QT interval  Baseline wander in lead(s) II,III,aVR,aVF     CULTURE, BLOOD    Collection Time: 09/15/22  2:50 PM    Specimen: Blood   Result Value Ref Range    Special Requests: No Special Requests      Culture result: No growth after 15 hours     CULTURE, BLOOD    Collection Time: 09/15/22  2:55 PM    Specimen: Blood   Result Value Ref Range    Special Requests: No Special Requests      Culture result: No growth after 15 hours     COVID-19 WITH INFLUENZA A/B    Collection Time: 09/15/22  3:16 PM   Result Value Ref Range    SARS-CoV-2 by PCR Not Detected Not Detected      Influenza A by PCR Not Detected Not Detected      Influenza B by PCR Not Detected Not Detected     TROPONIN-HIGH SENSITIVITY    Collection Time: 09/15/22  4:50 PM   Result Value Ref Range    Troponin-High Sensitivity 1,981 (HH) 0 - 76 ng/L   LACTIC ACID    Collection Time: 09/15/22  5:00 PM   Result Value Ref Range    Lactic acid 2.4 (HH) 0.4 - 2.0 mmol/L   URINALYSIS W/ RFLX MICROSCOPIC    Collection Time: 09/15/22  5:06 PM   Result Value Ref Range    Color Yellow/Straw      Appearance Hazy (A) Clear      Specific gravity >1.030 (H) 1.003 - 1.030    pH (UA) 5.5 5.0 - 8.0      Protein 30 (A) Negative mg/dL    Glucose Negative Negative mg/dL    Ketone Negative Negative mg/dL    Blood Negative Negative      Urobilinogen 0.2 0.2 - 1.0 EU/dL    Nitrites Negative Negative      Leukocyte Esterase Negative Negative     BILIRUBIN, CONFIRM    Collection Time: 09/15/22  5:06 PM   Result Value Ref Range    Bilirubin UA, confirm Positive (A) Negative     URINE MICROSCOPIC    Collection Time: 09/15/22  5:06 PM   Result Value Ref Range    WBC 0-4 0 - 5 /hpf    RBC 0-5 0 - 3 /hpf    Bacteria 2+ (A) Negative /hpf    Amorphous Crystals 1+ (A) Negative    Spermatozoa Present (A) Negative     CBC WITH AUTOMATED DIFF    Collection Time: 09/15/22  6:29 PM   Result Value Ref Range    WBC 14.6 (H) 4.1 - 11.1 K/uL    RBC 3.26 (L) 4.10 - 5.70 M/uL    HGB 9.1 (L) 12.1 - 17.0 g/dL    HCT 27.8 (L) 36.6 - 50.3 %    MCV 85.3 80.0 - 99.0 FL    MCH 27.9 26.0 - 34.0 PG    MCHC 32.7 30.0 - 36.5 g/dL    RDW 13.8 11.5 - 14.5 %    PLATELET 502 192 - 993 K/uL    MPV 9.4 8.9 - 12.9 FL    NRBC 0.0 0.0  WBC    ABSOLUTE NRBC 0.00 0.00 - 0.01 K/uL    NEUTROPHILS 83 (H) 32 - 75 %    LYMPHOCYTES 8 (L) 12 - 49 %    MONOCYTES 7 5 - 13 %    EOSINOPHILS 0 0 - 7 %    BASOPHILS 0 0 - 1 %    IMMATURE GRANULOCYTES 2 (H) 0 - 0.5 %    ABS. NEUTROPHILS 12.2 (H) 1.8 - 8.0 K/UL    ABS. LYMPHOCYTES 1.1 0.8 - 3.5 K/UL    ABS. MONOCYTES 1.0 0.0 - 1.0 K/UL    ABS. EOSINOPHILS 0.0 0.0 - 0.4 K/UL    ABS. BASOPHILS 0.0 0.0 - 0.1 K/UL    ABS. IMM.  GRANS. 0.3 (H) 0.00 - 0.04 K/UL    DF AUTOMATED     LACTIC ACID    Collection Time: 09/15/22  6:29 PM   Result Value Ref Range    Lactic acid 2.1 (HH) 0.4 - 2.0 mmol/L   TROPONIN-HIGH SENSITIVITY    Collection Time: 09/15/22  6:29 PM   Result Value Ref Range    Troponin-High Sensitivity 1,924 (HH) 0 - 76 ng/L   PTT    Collection Time: 09/15/22  8:13 PM   Result Value Ref Range    aPTT 69.2 (H) 21.2 - 34.1 sec    aPTT, therapeutic range   82 - 109 sec   PTT    Collection Time: 09/16/22  1:33 AM   Result Value Ref Range    aPTT 48.9 (H) 21.2 - 34.1 sec    aPTT, therapeutic range   82 - 109 sec   CBC WITH AUTOMATED DIFF    Collection Time: 09/16/22  3:52 AM   Result Value Ref Range    WBC 16.2 (H) 4.1 - 11.1 K/uL    RBC 3.29 (L) 4.10 - 5.70 M/uL    HGB 9.3 (L) 12.1 - 17.0 g/dL    HCT 28.0 (L) 36.6 - 50.3 %    MCV 85.1 80.0 - 99.0 FL    MCH 28.3 26.0 - 34.0 PG    MCHC 33.2 30.0 - 36.5 g/dL    RDW 14.0 11.5 - 14.5 %    PLATELET 956 215 - 768 K/uL    MPV 9.6 8.9 - 12.9 FL    NRBC 0.0 0.0  WBC    ABSOLUTE NRBC 0.00 0.00 - 0.01 K/uL    NEUTROPHILS 80 (H) 32 - 75 %    LYMPHOCYTES 9 (L) 12 - 49 %    MONOCYTES 9 5 - 13 %    EOSINOPHILS 0 0 - 7 %    BASOPHILS 0 0 - 1 %    IMMATURE GRANULOCYTES 2 (H) 0 - 0.5 %    ABS. NEUTROPHILS 12.9 (H) 1.8 - 8.0 K/UL    ABS. LYMPHOCYTES 1.5 0.8 - 3.5 K/UL    ABS. MONOCYTES 1.5 (H) 0.0 - 1.0 K/UL    ABS. EOSINOPHILS 0.0 0.0 - 0.4 K/UL    ABS. BASOPHILS 0.0 0.0 - 0.1 K/UL    ABS. IMM.  GRANS. 0.4 (H) 0.00 - 0.04 K/UL    DF AUTOMATED     RENAL FUNCTION PANEL    Collection Time: 09/16/22  3:52 AM   Result Value Ref Range    Sodium 135 (L) 136 - 145 mmol/L    Potassium 4.0 3.5 - 5.1 mmol/L    Chloride 105 97 - 108 mmol/L    CO2 24 21 - 32 mmol/L    Anion gap 6 5 - 15 mmol/L    Glucose 253 (H) 65 - 100 mg/dL    BUN 21 (H) 6 - 20 mg/dL    Creatinine 1.20 0.70 - 1.30 mg/dL    BUN/Creatinine ratio 18 12 - 20      GFR est AA >60 >60 ml/min/1.73m2    GFR est non-AA 60 (L) >60 ml/min/1.73m2    Calcium 7.7 (L) 8.5 - 10.1 mg/dL    Phosphorus 2.8 2.6 - 4.7 mg/dL    Albumin 1.8 (L) 3.5 - 5.0 g/dL   TROPONIN-HIGH SENSITIVITY    Collection Time: 09/16/22  3:52 AM   Result Value Ref Range    Troponin-High Sensitivity 1,728 (HH) 0 - 76 ng/L   PTT    Collection Time: 09/16/22  7:35 AM   Result Value Ref Range    aPTT 66.5 (H) 21.2 - 34.1 sec    aPTT, therapeutic range   82 - 109 sec   ECHO ADULT COMPLETE    Collection Time: 09/16/22 10:45 AM   Result Value Ref Range    LV EDV A4C 55 mL    LV ESV A4C 21 mL    IVSd 1.1 (A) 0.6 - 1.0 cm    LVIDd 4.9 4.2 - 5.9 cm    LVIDs 3.4 cm    LVOT Diameter 2.0 cm    LVOT Peak Velocity 0.9 m/s    LVOT Peak Gradient 3 mmHg    LVPWd 0.9 0.6 - 1.0 cm    LV Ejection Fraction A4C 62 %    LVOT Area 3.1 cm2    LA Major Berwind 6.4 cm    LA Area 4C 21.9 cm2    LA Diameter 4.0 cm    AV Peak Velocity 1.3 m/s    AV Peak Gradient 6 mmHg    AV Area by Peak Velocity 2.3 cm2    Aortic Root 3.7 cm    Ascending Aorta 2.4 cm    Descending Aorta 2.1 cm    MR Peak Velocity 4.2 m/s    MR Peak Gradient 71 mmHg    MV Max Velocity 1.2 m/s    MV Peak Gradient 6 mmHg    MV E Wave Deceleration Time 148.0 ms    MV A Velocity 0.73 m/s    MV E Velocity 0.38 m/s    MV Mean Gradient 2 mmHg    MV VTI 24.4 cm    MV Mean Velocity 0.6 m/s    Est. RA Pressure 15 mmHg    TR Max Velocity 1.43 m/s    TR Peak Gradient 8 mmHg    TAPSE 1.6 (A) 1.7 cm    Fractional Shortening 2D 31 28 - 44 %    LV ESV Index A4C 12 mL/m2    LV EDV Index A4C 32 mL/m2    LVIDd Index 2.82 cm/m2    LVIDs Index 1.95 cm/m2    LV RWT Ratio 0.37     LV Mass 2D 176.0 88 - 224 g    LV Mass 2D Index 101.2 49 - 115 g/m2    MV E/A 0.52     LA Size Index 2.30 cm/m2    LA/AO Root Ratio 1.08     Ao Root Index 2.13 cm/m2    Ascending Aorta Index 1.38 cm/m2    AV Velocity Ratio 0.69     WAYLON/BSA Peak Velocity 1.3 cm2/m2    RVSP 23 mmHg    Descending Aorta Index 1.21 cm/m2    LV Ejection Fraction A2C 58 %    IVC Proxmal 2.6 cm   PROCALCITONIN    Collection Time: 09/16/22 11:07 AM   Result Value Ref Range    Procalcitonin 1.54 (H) 0 ng/mL   C REACTIVE PROTEIN, QT    Collection Time: 09/16/22 11:07 AM   Result Value Ref Range    C-Reactive protein 16.90 (H) 0.00 - 0.60 mg/dL       XR Results (maximum last 3):   Results from [place] encounter on 09/15/22    XR CHEST PORT    Impression  Patchy diffuse mild lung infiltrates and interstitial prominence, new since  8/4/2022 at the level of the lung bases which were clear at that time. .  Correlate for infection versus developing edema. .      CT Results (maximum last 3): Results from [place] encounter on 09/15/22    CT ABD PELV WO CONT    Impression  1. Rapid progression of metastatic disease. 2. Metastatic pulmonary nodules are better imaged but grossly unchanged. 3. Progression of hepatic metastatic disease. New and increased size of hepatic  masses. 4. New splenic infarcts versus splenic masses. 5. Ill-defined pancreatic head mass. The CBD stent is in good position. CT CHEST WO CONT    Impression  1. Rapid progression of metastatic disease. 2. Metastatic pulmonary nodules are better imaged but grossly unchanged. 3. Progression of hepatic metastatic disease. New and increased size of hepatic  masses. 4. New splenic infarcts versus splenic masses. 5. Ill-defined pancreatic head mass. The CBD stent is in good position. Results from [place] encounter on 08/04/22    CT ABD W WO CONT    Impression  Large pancreatic mass with extensive infiltrative extension along  the celiac axis and superior mesenteric artery with biliary and pancreatic duct  obstruction with upstream dilation suspicious for pancreatic needle such as  adenocarcinoma with multiple liver metastases as above.     23X        Current Facility-Administered Medications:     hydrALAZINE (APRESOLINE) 20 mg/mL injection 10 mg, 10 mg, IntraVENous, QID PRN, Cherise Fitzgerald PA    morphine injection 1 mg, 1 mg, IntraVENous, Q4H PRN, Miguelangel Tarango MD    oxyCODONE-acetaminophen (PERCOCET) 5-325 mg per tablet 1 Tablet, 1 Tablet, Oral, Q4H PRN, Cherise Fitzgerald, PA    traMADoL (ULTRAM) tablet 50 mg, 50 mg, Oral, Q6H PRN, Cherise Fitzgerald, PA    0.9% sodium chloride infusion, 125 mL/hr, IntraVENous, CONTINUOUS, Cherise Fitzgerald, [address], Last Rate: 125 mL/hr at 09/16/22 1157, 125 mL/hr at 09/16/22 1157    heparin 25,000 units in D5W 250 ml infusion, 12-25 Units/kg/hr, IntraVENous, TITRATE, Cathye Spar, DO, Last Rate: 10.4 mL/hr at 09/16/22 0816, 16 Units/kg/hr at 09/16/22 0816    heparin (porcine) 1,000 unit/mL injection 3,920 Units, 60 Units/kg, IntraVENous, PRN **OR** heparin (porcine) 1,000 unit/mL injection 1,960 Units, 30 Units/kg, IntraVENous, PRN, Cathye Spar, DO, 1,960 Units at 09/16/22 7592    aspirin delayed-release tablet 81 mg, 81 mg, Oral, DAILY, Cisco Zaragoza MD, 81 mg at 09/16/22 1046    megestroL (MEGACE) tablet 40 mg, 40 mg, Oral, BID, Cisco Zaragoza MD, 40 mg at 09/16/22 1046    oxyCODONE IR (ROXICODONE) tablet 5 mg, 5 mg, Oral, Q6H PRN, Cisco Zaragoza MD    mirtazapine (REMERON) tablet 15 mg, 15 mg, Oral, QHS, Cisco Zaragoza MD    pantoprazole (PROTONIX) tablet 40 mg, 40 mg, Oral, ACB, Cisco Zaragoza MD, 40 mg at 09/16/22 1048    sodium chloride (NS) flush 5-40 mL, 5-40 mL, IntraVENous, Q8H, Cisco Zaragoza MD, 10 mL at 09/15/22 2200    sodium chloride (NS) flush 5-40 mL, 5-40 mL, IntraVENous, PRN, Cisco Zaragoza MD    acetaminophen (TYLENOL) tablet 650 mg, 650 mg, Oral, Q6H PRN, 650 mg at 09/16/22 0646 **OR** acetaminophen (TYLENOL) suppository 650 mg, 650 mg, Rectal, Q6H PRN, Cisco Zaragoza MD    ondansetron (ZOFRAN ODT) tablet 4 mg, 4 mg, Oral, Q6H PRN **OR** ondansetron (ZOFRAN) injection 4 mg, 4 mg, IntraVENous, Q6H PRN, Cisco Zaragoza MD    [COMPLETED] piperacillin-tazobactam (ZOSYN) 4.5 g in 0.9% sodium chloride (MBP/ADV) 100 mL MBP, 4.5 g, IntraVENous, ONCE, IV Completed at 09/16/22 0358 **FOLLOWED BY** piperacillin-tazobactam (ZOSYN) 3.375 g in 0.9% sodium chloride (MBP/ADV) 100 mL MBP, 3.375 g, IntraVENous, Q8H, Cisco Zaragoza MD, Last Rate: 25 mL/hr at 09/16/22 1124, 3.375 g at 09/16/22 1124    Current Outpatient Medications:     oxyCODONE IR (ROXICODONE) 5 mg immediate release tablet, Take 5 mg by mouth every six (6) hours as needed for Severe Pain., Disp: , Rfl:     mirtazapine (REMERON) 15 mg tablet, Take 15 mg by mouth nightly.  , Disp: , Rfl:     omeprazole (PRILOSEC) 40 mg capsule, Take 40 mg by mouth Daily (before breakfast). , Disp: , Rfl:     valsartan (DIOVAN) 320 mg tablet, Take 320 mg by mouth daily. , Disp: , Rfl:     tamsulosin (FLOMAX) 0.4 mg capsule, Take 0.4 mg by mouth daily. , Disp: , Rfl:     aspirin delayed-release 81 mg tablet, Take 81 mg by mouth daily. , Disp: , Rfl:     amLODIPine (NORVASC) 10 mg tablet, Take 10 mg by mouth daily. , Disp: , Rfl:     rosuvastatin (CRESTOR) 40 mg tablet, Take 40 mg by mouth daily. , Disp: , Rfl:       Case discussed with collaborating physician Dr. Miko Nunez  and our impression and recommendations are as follows:     Elevated troponin:   - c/o generalized weakness and syncopal episode at home  - no active chest pain or shortness of breath  - HS troponin peaked at [facility], currently 1728, EKG non-ischemic. On heparin gtt and asa 81 mg daily.   - r/o secondary causes. - Echo: EF 55-60%, moderately reduced RV function, global hypokinesis. Moderate pericardial effusion. LA/RA mildly dilated. 2. Hypotension:   - initial blood pressure below goal  - hold home meds at this time. - continue to monitor    3. Pancreatic cancer with mets:   - newly diagnosed 08/15/22, with increased risk factors for PE  - c/o left calf pain, will order venous duplex to r/o DVT and CTA pending for PE evaluation. Thank you for involving us in the care of this patient. Please do not hesitate to call if additional questions arise. If after hours please call [phone]. Patient seen and examined. Agree with findings and plan of care per NP Juan David Sher . Large PE with right heart strain. Consider pulm eval for thrombectomy. Dr. [facility]  [address] [facility].    [address] [address]  [phone]

## 2022-09-16 NOTE — PROGRESS NOTES
Hospitalist Progress Note    Subjective:   Daily Progress Note: 9/16/2022 8:31 AM    Patient having back pain not controlled with tylenol. Patient does not use oxygen at home. Patient denies hemoptysis, wheezing, cough, or chest pain. Addendum:    CTA of the chest with extensive right-sided pulmonary embolism with borderline deviation of the interventricular septum and metastatic disease to lungs and liver. IR, vascular, and pulmonology consulted. IR, Dr. Merna Owen, recommending heparin drip with no acute intervention at this time. Vascular unavailable. Duplex pending.     Current Facility-Administered Medications   Medication Dose Route Frequency    heparin 25,000 units in D5W 250 ml infusion  12-25 Units/kg/hr IntraVENous TITRATE    heparin (porcine) 1,000 unit/mL injection 3,920 Units  60 Units/kg IntraVENous PRN    Or    heparin (porcine) 1,000 unit/mL injection 1,960 Units  30 Units/kg IntraVENous PRN    aspirin delayed-release tablet 81 mg  81 mg Oral DAILY    megestroL (MEGACE) tablet 40 mg  40 mg Oral BID    oxyCODONE IR (ROXICODONE) tablet 5 mg  5 mg Oral Q6H PRN    mirtazapine (REMERON) tablet 15 mg  15 mg Oral QHS    pantoprazole (PROTONIX) tablet 40 mg  40 mg Oral ACB    sodium chloride (NS) flush 5-40 mL  5-40 mL IntraVENous Q8H    sodium chloride (NS) flush 5-40 mL  5-40 mL IntraVENous PRN    acetaminophen (TYLENOL) tablet 650 mg  650 mg Oral Q6H PRN    Or    acetaminophen (TYLENOL) suppository 650 mg  650 mg Rectal Q6H PRN    ondansetron (ZOFRAN ODT) tablet 4 mg  4 mg Oral Q6H PRN    Or    ondansetron (ZOFRAN) injection 4 mg  4 mg IntraVENous Q6H PRN    piperacillin-tazobactam (ZOSYN) 3.375 g in 0.9% sodium chloride (MBP/ADV) 100 mL MBP  3.375 g IntraVENous Q8H    NOREPINephrine (LEVOPHED) 8 mg in 0.9% NS 250ml infusion  0.5-30 mcg/min IntraVENous TITRATE    dextrose 5% - 0.9% NaCl with KCl 20 mEq/L infusion  125 mL/hr IntraVENous CONTINUOUS     Current Outpatient Medications   Medication Sig oxyCODONE IR (ROXICODONE) 5 mg immediate release tablet Take 5 mg by mouth every six (6) hours as needed for Severe Pain.    mirtazapine (REMERON) 15 mg tablet Take 15 mg by mouth nightly. omeprazole (PRILOSEC) 40 mg capsule Take 40 mg by mouth Daily (before breakfast). megestroL (MEGACE) 40 mg tablet Take 1 Tablet by mouth two (2) times a day. valsartan (DIOVAN) 320 mg tablet Take  by mouth daily. tamsulosin (FLOMAX) 0.4 mg capsule Take 0.4 mg by mouth daily. aspirin delayed-release 81 mg tablet Take  by mouth daily. amLODIPine (NORVASC) 10 mg tablet     rosuvastatin (CRESTOR) 40 mg tablet         Review of Systems  Review of Systems   Constitutional: Negative. Respiratory:  Positive for shortness of breath. Negative for cough and wheezing. Cardiovascular: Negative. Gastrointestinal: Negative. Musculoskeletal:  Positive for back pain. All other systems reviewed and are negative. Objective:     Visit Vitals  /63   Pulse (!) 103   Temp 98.2 °F (36.8 °C)   Resp (!) 31   Ht 5' 6\" (1.676 m)   Wt 65.3 kg (144 lb)   SpO2 97%   BMI 23.24 kg/m²    O2 Flow Rate (L/min): 3 l/min O2 Device: Nasal cannula    Temp (24hrs), Av °F (36.7 °C), Min:97.9 °F (36.6 °C), Max:98.2 °F (36.8 °C)      No intake/output data recorded.  1901 -  0700  In: 1175 [I.V.:1175]  Out: 200 [Urine:200]    No orders to display        PHYSICAL EXAM:    Physical Exam  Vitals and nursing note reviewed. Constitutional:       Comments: Thinly built   HENT:      Head: Normocephalic and atraumatic. Cardiovascular:      Rate and Rhythm: Normal rate and regular rhythm. Pulmonary:      Effort: No respiratory distress. Breath sounds: No wheezing. Abdominal:      General: Bowel sounds are normal. There is no distension. Palpations: Abdomen is soft. Tenderness: There is no abdominal tenderness.    Genitourinary:     Comments: No tovar present  Musculoskeletal:      Right lower leg: No edema. Left lower leg: No edema. Neurological:      Mental Status: He is alert and oriented to person, place, and time. Psychiatric:         Mood and Affect: Mood normal.        Data Review    Recent Results (from the past 24 hour(s))   CBC WITH AUTOMATED DIFF    Collection Time: 09/15/22  2:10 PM   Result Value Ref Range    WBC 19.9 (H) 4.4 - 11.3 K/uL    RBC 3.98 (L) 4.50 - 5.90 M/uL    HGB 11.1 (L) 13.5 - 17.5 g/dL    HCT 33.7 (L) 41 - 53 %    MCV 84.8 80 - 100 FL    MCH 27.9 (L) 31 - 34 PG    MCHC 32.9 31.0 - 36.0 g/dL    RDW 14.7 (H) 11.5 - 14.5 %    PLATELET 988 201 - 729 K/uL    MPV 7.0 6.5 - 11.5 FL    NRBC 0.2  WBC    ABSOLUTE NRBC 0.04 K/uL    NEUTROPHILS 86 (H) 42 - 75 %    LYMPHOCYTES 6 (L) 20.5 - 51.1 %    MONOCYTES 8 1.7 - 9.3 %    EOSINOPHILS 0 (L) 0.9 - 2.9 %    BASOPHILS 0 0.0 - 2.5 %    ABS. NEUTROPHILS 17.2 (H) 1.8 - 7.7 K/UL    ABS. LYMPHOCYTES 1.2 1.0 - 4.8 K/UL    ABS. MONOCYTES 1.5 0.2 - 2.4 K/UL    ABS. EOSINOPHILS 0.0 0.0 - 0.7 K/UL    ABS. BASOPHILS 0.0 0.0 - 0.2 K/UL   METABOLIC PANEL, COMPREHENSIVE    Collection Time: 09/15/22  2:10 PM   Result Value Ref Range    Sodium 135 (L) 136 - 145 mmol/L    Potassium 3.7 3.5 - 5.1 mmol/L    Chloride 98 97 - 108 mmol/L    CO2 26 21 - 32 mmol/L    Anion gap 11 5 - 15 mmol/L    Glucose 137 (H) 65 - 100 mg/dL    BUN 22 (H) 6 - 20 mg/dL    Creatinine 1.77 (H) 0.70 - 1.30 mg/dL    BUN/Creatinine ratio 12 12 - 20      GFR est AA 46 (L) >60 ml/min/1.73m2    GFR est non-AA 38 (L) >60 ml/min/1.73m2    Calcium 8.9 8.5 - 10.1 mg/dL    Bilirubin, total 1.0 0.2 - 1.0 mg/dL    AST (SGOT) 36 15 - 37 U/L    ALT (SGPT) 17 12 - 78 U/L    Alk.  phosphatase 145 (H) 45 - 117 U/L    Protein, total 7.3 6.4 - 8.2 g/dL    Albumin 2.3 (L) 3.5 - 5.0 g/dL    Globulin 5.0 (H) 2.0 - 4.0 g/dL    A-G Ratio 0.5 (L) 1.1 - 2.2     TROPONIN-HIGH SENSITIVITY    Collection Time: 09/15/22  2:10 PM   Result Value Ref Range    Troponin-High Sensitivity 1,990 (HH) 0 - 76 ng/L LACTIC ACID    Collection Time: 09/15/22  2:10 PM   Result Value Ref Range    Lactic acid 2.4 (HH) 0.4 - 2.0 mmol/L   PTT    Collection Time: 09/15/22  2:10 PM   Result Value Ref Range    aPTT 39.7 (H) 21.2 - 34.1 sec    aPTT, therapeutic range   82 - 109 sec   EKG, 12 LEAD, INITIAL    Collection Time: 09/15/22  2:35 PM   Result Value Ref Range    Ventricular Rate 101 BPM    Atrial Rate 101 BPM    P-R Interval 139 ms    QRS Duration 80 ms    Q-T Interval 424 ms    QTC Calculation (Bezet) 550 ms    Calculated P Axis 67 degrees    Calculated R Axis 44 degrees    Diagnosis       Sinus tachycardia  Ventricular premature complex  Low voltage, extremity leads  Prolonged QT interval  Baseline wander in lead(s) II,III,aVR,aVF     CULTURE, BLOOD    Collection Time: 09/15/22  2:50 PM    Specimen: Blood   Result Value Ref Range    Special Requests: No Special Requests      Culture result: No growth after 15 hours     CULTURE, BLOOD    Collection Time: 09/15/22  2:55 PM    Specimen: Blood   Result Value Ref Range    Special Requests: No Special Requests      Culture result: No growth after 15 hours     COVID-19 WITH INFLUENZA A/B    Collection Time: 09/15/22  3:16 PM   Result Value Ref Range    SARS-CoV-2 by PCR Not Detected Not Detected      Influenza A by PCR Not Detected Not Detected      Influenza B by PCR Not Detected Not Detected     TROPONIN-HIGH SENSITIVITY    Collection Time: 09/15/22  4:50 PM   Result Value Ref Range    Troponin-High Sensitivity 1,981 (HH) 0 - 76 ng/L   LACTIC ACID    Collection Time: 09/15/22  5:00 PM   Result Value Ref Range    Lactic acid 2.4 (HH) 0.4 - 2.0 mmol/L   URINALYSIS W/ RFLX MICROSCOPIC    Collection Time: 09/15/22  5:06 PM   Result Value Ref Range    Color Yellow/Straw      Appearance Hazy (A) Clear      Specific gravity >1.030 (H) 1.003 - 1.030    pH (UA) 5.5 5.0 - 8.0      Protein 30 (A) Negative mg/dL    Glucose Negative Negative mg/dL    Ketone Negative Negative mg/dL    Blood Negative Negative      Urobilinogen 0.2 0.2 - 1.0 EU/dL    Nitrites Negative Negative      Leukocyte Esterase Negative Negative     BILIRUBIN, CONFIRM    Collection Time: 09/15/22  5:06 PM   Result Value Ref Range    Bilirubin UA, confirm Positive (A) Negative     URINE MICROSCOPIC    Collection Time: 09/15/22  5:06 PM   Result Value Ref Range    WBC 0-4 0 - 5 /hpf    RBC 0-5 0 - 3 /hpf    Bacteria 2+ (A) Negative /hpf    Amorphous Crystals 1+ (A) Negative    Spermatozoa Present (A) Negative     CBC WITH AUTOMATED DIFF    Collection Time: 09/15/22  6:29 PM   Result Value Ref Range    WBC 14.6 (H) 4.1 - 11.1 K/uL    RBC 3.26 (L) 4.10 - 5.70 M/uL    HGB 9.1 (L) 12.1 - 17.0 g/dL    HCT 27.8 (L) 36.6 - 50.3 %    MCV 85.3 80.0 - 99.0 FL    MCH 27.9 26.0 - 34.0 PG    MCHC 32.7 30.0 - 36.5 g/dL    RDW 13.8 11.5 - 14.5 %    PLATELET 568 434 - 193 K/uL    MPV 9.4 8.9 - 12.9 FL    NRBC 0.0 0.0  WBC    ABSOLUTE NRBC 0.00 0.00 - 0.01 K/uL    NEUTROPHILS 83 (H) 32 - 75 %    LYMPHOCYTES 8 (L) 12 - 49 %    MONOCYTES 7 5 - 13 %    EOSINOPHILS 0 0 - 7 %    BASOPHILS 0 0 - 1 %    IMMATURE GRANULOCYTES 2 (H) 0 - 0.5 %    ABS. NEUTROPHILS 12.2 (H) 1.8 - 8.0 K/UL    ABS. LYMPHOCYTES 1.1 0.8 - 3.5 K/UL    ABS. MONOCYTES 1.0 0.0 - 1.0 K/UL    ABS. EOSINOPHILS 0.0 0.0 - 0.4 K/UL    ABS. BASOPHILS 0.0 0.0 - 0.1 K/UL    ABS. IMM.  GRANS. 0.3 (H) 0.00 - 0.04 K/UL    DF AUTOMATED     LACTIC ACID    Collection Time: 09/15/22  6:29 PM   Result Value Ref Range    Lactic acid 2.1 (HH) 0.4 - 2.0 mmol/L   TROPONIN-HIGH SENSITIVITY    Collection Time: 09/15/22  6:29 PM   Result Value Ref Range    Troponin-High Sensitivity 1,924 (HH) 0 - 76 ng/L   PTT    Collection Time: 09/15/22  8:13 PM   Result Value Ref Range    aPTT 69.2 (H) 21.2 - 34.1 sec    aPTT, therapeutic range   82 - 109 sec   PTT    Collection Time: 09/16/22  1:33 AM   Result Value Ref Range    aPTT 48.9 (H) 21.2 - 34.1 sec    aPTT, therapeutic range   82 - 109 sec   CBC WITH AUTOMATED DIFF    Collection Time: 09/16/22  3:52 AM   Result Value Ref Range    WBC 16.2 (H) 4.1 - 11.1 K/uL    RBC 3.29 (L) 4.10 - 5.70 M/uL    HGB 9.3 (L) 12.1 - 17.0 g/dL    HCT 28.0 (L) 36.6 - 50.3 %    MCV 85.1 80.0 - 99.0 FL    MCH 28.3 26.0 - 34.0 PG    MCHC 33.2 30.0 - 36.5 g/dL    RDW 14.0 11.5 - 14.5 %    PLATELET 443 073 - 893 K/uL    MPV 9.6 8.9 - 12.9 FL    NRBC 0.0 0.0  WBC    ABSOLUTE NRBC 0.00 0.00 - 0.01 K/uL    NEUTROPHILS 80 (H) 32 - 75 %    LYMPHOCYTES 9 (L) 12 - 49 %    MONOCYTES 9 5 - 13 %    EOSINOPHILS 0 0 - 7 %    BASOPHILS 0 0 - 1 %    IMMATURE GRANULOCYTES 2 (H) 0 - 0.5 %    ABS. NEUTROPHILS 12.9 (H) 1.8 - 8.0 K/UL    ABS. LYMPHOCYTES 1.5 0.8 - 3.5 K/UL    ABS. MONOCYTES 1.5 (H) 0.0 - 1.0 K/UL    ABS. EOSINOPHILS 0.0 0.0 - 0.4 K/UL    ABS. BASOPHILS 0.0 0.0 - 0.1 K/UL    ABS. IMM.  GRANS. 0.4 (H) 0.00 - 0.04 K/UL    DF AUTOMATED     RENAL FUNCTION PANEL    Collection Time: 09/16/22  3:52 AM   Result Value Ref Range    Sodium 135 (L) 136 - 145 mmol/L    Potassium 4.0 3.5 - 5.1 mmol/L    Chloride 105 97 - 108 mmol/L    CO2 24 21 - 32 mmol/L    Anion gap 6 5 - 15 mmol/L    Glucose 253 (H) 65 - 100 mg/dL    BUN 21 (H) 6 - 20 mg/dL    Creatinine 1.20 0.70 - 1.30 mg/dL    BUN/Creatinine ratio 18 12 - 20      GFR est AA >60 >60 ml/min/1.73m2    GFR est non-AA 60 (L) >60 ml/min/1.73m2    Calcium 7.7 (L) 8.5 - 10.1 mg/dL    Phosphorus 2.8 2.6 - 4.7 mg/dL    Albumin 1.8 (L) 3.5 - 5.0 g/dL   TROPONIN-HIGH SENSITIVITY    Collection Time: 09/16/22  3:52 AM   Result Value Ref Range    Troponin-High Sensitivity 1,728 (HH) 0 - 76 ng/L   PTT    Collection Time: 09/16/22  7:35 AM   Result Value Ref Range    aPTT 66.5 (H) 21.2 - 34.1 sec    aPTT, therapeutic range   82 - 109 sec        Assessment/Plan:     Active Problems:    Carcinoma of head of pancreas ([facility] (9/15/2022)      Hypotension (9/15/2022)      NSTEMI (non-ST elevated myocardial infarction) ([facility] (9/15/2022)        Hospital Course:    Donita Peña is a 71year old male with a PMH of metastatic pancreatic cancer, diabetes, and hypertension who presents with syncope via EMS. EMS found sinus tachycardia with hypotension and started on IV fluids. In ED hypotensive. Despite given 3 L of IV fluids in total patient remained hypotensive and was started on Levophed. Initial labs significant for WBC of 14.6, hemoglobin of 9.1, creatinine 1.77, lactic of 2.4, troponin of 1990, BUN of 22. Urinalysis with bacteria, amorphous crystals, and spermatozoa. CXR with patchy diffuse mild lung infiltrates and interstitial prominence. CT of the abdomen showed progression of hepatic metastatic disease, new splenic infarct versus splenic masses, CBD stent is in good position. CT of the chest showed grossly unchanged pulmonary nodules and nodular opacities without fibrosis. Patient was admitted for further management. Overnight levophed was weaned and BP remained stable off of Levophed. Patient was started on heparin drip and Zosyn. Cardiology, ID, and oncology consulted. CTA of the chest with extensive right-sided pulmonary embolism with borderline deviation of the interventricular septum and metastatic disease to lungs and liver. IR, vascular, and pulmonology consulted. IR, Dr. Marilyn Mina, who is recommending a heparin drip with no acute intervention at this time. Vascular unavailable. Duplex pending. ECHO showed LVEF of 55-60% with moderately reduced systolic function, global hypokinesis, moderate pericardial effusion and dilated LA/RA.     Persistent hypotension  Most likely 2/2 increased strain from the heart from PE, s/p levophed  Continue IV fluids     Pulmonary embolism  CTA of the chest with extensive right-sided pulmonary embolism with borderline deviation of the interventricular septum and metastatic disease to lungs and liver  Currently patient is hypoxic on 2L otherwise VS unremarkable  Continue on heparin drip  Discussed with IR, Dr. Marilyn Mina, recommending a heparin drip with no acute intervention at this time  Discussed with vascular but unavailable this weekend  Discussed with pulmonology  Maintain O2 saturations >92%  Duplex pending  IR, vascular, and pulmonology consulted    Leukocytosis  most likely reactive vs infectious  Abnormal UA + bacteria, amorphous crystals, and spermatoza  CXR with patchy diffuse mild lung infiltrates and interstitial prominence  CT of the chest showed grossly unchanged pulmonary nodules and nodular opacities without fibrosis  9/15 blood: NGTD, prelim  Continue on Zosyn   ID consulted    NSTEMI  Troponin [id number]  Continue heparin drip  Echocardiogram showing LVEF of 55-60% with moderstely reduced systolic function, global hypokinesis, moderate pericardial effusion and dilated LA/RA  Cardiology following    Metastatic pancreatic cancer with obstructive jaundice   S/p stent (mets to lung and liver with a splenic infarct versus mets)  Port-A-Cath soon for chemotherapy, established at [facility]  Pain control  Oncology consulted    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 Salima Medina discussed with: Patient/Family and Nurse    Total time spent with patient: 35 minutes.

## 2022-09-16 NOTE — PROGRESS NOTES
Reason for Admission:  NSTEMI                     RUR Score: 14%                    Plan for utilizing home health:    [facility], [facility]       PCP: First and Last name:  Saskia Barnes MD     Name of Practice:    Are you a current patient: Yes/No:    Approximate date of last visit: August   Can you participate in a virtual visit with your PCP:                     Current Advanced Directive/Advance Care Plan: Full Code      Healthcare Decision Maker:   Click here to complete [id number] including selection of the Healthcare Decision Maker Relationship (ie \"Primary\")    Tawana Pascual, wife, [phone]                         Transition of Care Plan:      Met f/f with Pt, his wife and daughter, they confirmed that the information on the face sheet is correct. Pt daughter stated that Pt is trying to get set up with [facility]. Pt daughter stated no DME but that Pt is trying to get a rollator, Pt daughter stated that Pt needs help with bathing and dressing, Pt can feed himself. Pt daughter stated that family will give Pt a ride home when he is D/C.  dispo: home with . Pt daughter  signed the choice letter for [facility], will send the referral, will place the choice letter on Pt chart.

## 2022-09-16 NOTE — CONSULTS
Consult Date: 9/16/2022    Consults Leukocytosis without clear source in cancer patient, abnormal UTI     Subjective    This is a 71year old male, diabetic with metastatic pancreatic cancer to the lungs,  brought to ED because of syncope. On presentation, he was afebrile but tachycardic and hypotensive with WBC 19,900. Procal  and CRP also elevated. UA with bacteria but no pyuria. CXR showed Patchy diffuse mild lung infiltrates and interstitial prominence. CT Chest/abdomen showed rapid progression of metastatic disease with metastatic pulmonary nodules andn progression of hepatic metastatic disease and new splenic infarcts versus splenic masses, CBD stent. Images reviewed by me. Blood cultures sent and patient was started on IV Zosyn. ID has been consulted for this reason. Patient resting comfortably. He is awake and responsive but does not remember why he was brought to hospital. Wife at bedside state that became unresponsive. Patient reports low back pain but no flank pain and no problem with urination He denies any cough or SOB, no open sores. Patient reportedly is followed by Oncology at [address] [place] [address] but he has not yet started chemotherapy.      Past Medical History:   Diagnosis Date    Diverticulosis 4/19/2022    Hypercholesterolemia     Hyperlipemia     Hypertension     Type II diabetes mellitus ([facility] [address] 4/19/2022    Weight loss 4/19/2022      Past Surgical History:   Procedure Laterality Date    COLONOSCOPY  05/13/2015    colon screen    ENDOSCOPY VISIT-OUTPATIENT  04/20/2022     Family History   Problem Relation Age of Onset    Stroke Mother     Stroke Father     Hypertension Other     High Cholesterol Other     Heart Disease Other       Social History     Tobacco Use    Smoking status: Never    Smokeless tobacco: Never   Substance Use Topics    Alcohol use: Never       Current Facility-Administered Medications   Medication Dose Route Frequency Provider Last Rate Last Admin    hydrALAZINE (APRESOLINE) 20 mg/mL injection 10 mg  10 mg IntraVENous QID PRN Cherise Fitzgerald PA        morphine injection 1 mg  1 mg IntraVENous Q4H PRN Ankit Levine MD        oxyCODONE-acetaminophen (PERCOCET) 5-325 mg per tablet 1 Tablet  1 Tablet Oral Q4H PRN Cherise Fitzgerald PA        traMADoL (ULTRAM) tablet 50 mg  50 mg Oral Q6H PRN Zohaib Fitzgerald PA        heparin 25,000 units in D5W 250 ml infusion  12-25 Units/kg/hr IntraVENous TITRATE Velvet Sour, DO 10.4 mL/hr at 09/16/22 0816 16 Units/kg/hr at 09/16/22 0816    heparin (porcine) 1,000 unit/mL injection 3,920 Units  60 Units/kg IntraVENous PRN Velvet Sour, DO        Or    heparin (porcine) 1,000 unit/mL injection 1,960 Units  30 Units/kg IntraVENous PRN Velvet Sour, DO   1,960 Units at 09/16/22 2562    aspirin delayed-release tablet 81 mg  81 mg Oral DAILY Rossy Kemp MD        megestroL (MEGACE) tablet 40 mg  40 mg Oral BID Rossy Kemp MD        oxyCODONE IR (ROXICODONE) tablet 5 mg  5 mg Oral Q6H PRN Rossy Kemp MD        mirtazapine (REMERON) tablet 15 mg  15 mg Oral QHS Rossy Kemp MD        pantoprazole (PROTONIX) tablet 40 mg  40 mg Oral ACB Rossy Kemp MD        sodium chloride (NS) flush 5-40 mL  5-40 mL IntraVENous Q8H Rossy Kemp MD   10 mL at 09/15/22 2200    sodium chloride (NS) flush 5-40 mL  5-40 mL IntraVENous PRN Rossy Kemp MD        acetaminophen (TYLENOL) tablet 650 mg  650 mg Oral Q6H PRN Rossy Kemp MD   650 mg at 09/16/22 4814    Or    acetaminophen (TYLENOL) suppository 650 mg  650 mg Rectal Q6H PRN Rossy Kemp MD        ondansetron (ZOFRAN ODT) tablet 4 mg  4 mg Oral Q6H PRN Rossy Kemp MD        Or    ondansetron (ZOFRAN) injection 4 mg  4 mg IntraVENous Q6H PRN Rossy Kemp MD        piperacillin-tazobactam (ZOSYN) 3.375 g in 0.9% sodium chloride (MBP/ADV) 100 mL MBP  3.375 g IntraVENous Q8H Elsi Anny Hernandez MD 25 mL/hr at 09/16/22 0330 3.375 g at 09/16/22 0330    dextrose 5% - 0.9% NaCl with KCl 20 mEq/L infusion  125 mL/hr IntraVENous CONTINUOUS Mag Varghese  mL/hr at 09/16/22 0551 125 mL/hr at 09/16/22 0551     Current Outpatient Medications   Medication Sig Dispense Refill    oxyCODONE IR (ROXICODONE) 5 mg immediate release tablet Take 5 mg by mouth every six (6) hours as needed for Severe Pain.      mirtazapine (REMERON) 15 mg tablet Take 15 mg by mouth nightly. omeprazole (PRILOSEC) 40 mg capsule Take 40 mg by mouth Daily (before breakfast). megestroL (MEGACE) 40 mg tablet Take 1 Tablet by mouth two (2) times a day. 60 Tablet 3    valsartan (DIOVAN) 320 mg tablet Take  by mouth daily. tamsulosin (FLOMAX) 0.4 mg capsule Take 0.4 mg by mouth daily. aspirin delayed-release 81 mg tablet Take  by mouth daily. amLODIPine (NORVASC) 10 mg tablet       rosuvastatin (CRESTOR) 40 mg tablet           Review of Systems   Constitutional:  Negative for chills and fever. HENT: Negative. Eyes: Negative. Respiratory: Negative. Cardiovascular: Negative. Gastrointestinal: Negative. Endocrine: Negative. Genitourinary: Negative. Musculoskeletal: Negative. Allergic/Immunologic: Negative. Neurological:  Positive for syncope and light-headedness. Hematological: Negative. Psychiatric/Behavioral: Negative. Objective     Vital signs for last 24 hours:  Visit Vitals  /63   Pulse (!) 103   Temp 98.2 °F (36.8 °C)   Resp (!) 31   Ht 5' 6\" (1.676 m)   Wt 144 lb (65.3 kg)   SpO2 97%   BMI 23.24 kg/m²       Intake/Output this shift:  Current Shift: No intake/output data recorded.   Last 3 Shifts: 09/14 1901 - 09/16 0700  In: 1175 [I.V.:1175]  Out: 200 [Urine:200]    Data Review:   Recent Results (from the past 24 hour(s))   CBC WITH AUTOMATED DIFF    Collection Time: 09/15/22  2:10 PM   Result Value Ref Range    WBC 19.9 (H) 4.4 - 11.3 K/uL    RBC 3.98 (L) 4.50 - 5.90 M/uL    HGB 11.1 (L) 13.5 - 17.5 g/dL    HCT 33.7 (L) 41 - 53 %    MCV 84.8 80 - 100 FL    MCH 27.9 (L) 31 - 34 PG    MCHC 32.9 31.0 - 36.0 g/dL    RDW 14.7 (H) 11.5 - 14.5 %    PLATELET 841 145 - 279 K/uL    MPV 7.0 6.5 - 11.5 FL    NRBC 0.2  WBC    ABSOLUTE NRBC 0.04 K/uL    NEUTROPHILS 86 (H) 42 - 75 %    LYMPHOCYTES 6 (L) 20.5 - 51.1 %    MONOCYTES 8 1.7 - 9.3 %    EOSINOPHILS 0 (L) 0.9 - 2.9 %    BASOPHILS 0 0.0 - 2.5 %    ABS. NEUTROPHILS 17.2 (H) 1.8 - 7.7 K/UL    ABS. LYMPHOCYTES 1.2 1.0 - 4.8 K/UL    ABS. MONOCYTES 1.5 0.2 - 2.4 K/UL    ABS. EOSINOPHILS 0.0 0.0 - 0.7 K/UL    ABS. BASOPHILS 0.0 0.0 - 0.2 K/UL   METABOLIC PANEL, COMPREHENSIVE    Collection Time: 09/15/22  2:10 PM   Result Value Ref Range    Sodium 135 (L) 136 - 145 mmol/L    Potassium 3.7 3.5 - 5.1 mmol/L    Chloride 98 97 - 108 mmol/L    CO2 26 21 - 32 mmol/L    Anion gap 11 5 - 15 mmol/L    Glucose 137 (H) 65 - 100 mg/dL    BUN 22 (H) 6 - 20 mg/dL    Creatinine 1.77 (H) 0.70 - 1.30 mg/dL    BUN/Creatinine ratio 12 12 - 20      GFR est AA 46 (L) >60 ml/min/1.73m2    GFR est non-AA 38 (L) >60 ml/min/1.73m2    Calcium 8.9 8.5 - 10.1 mg/dL    Bilirubin, total 1.0 0.2 - 1.0 mg/dL    AST (SGOT) 36 15 - 37 U/L    ALT (SGPT) 17 12 - 78 U/L    Alk.  phosphatase 145 (H) 45 - 117 U/L    Protein, total 7.3 6.4 - 8.2 g/dL    Albumin 2.3 (L) 3.5 - 5.0 g/dL    Globulin 5.0 (H) 2.0 - 4.0 g/dL    A-G Ratio 0.5 (L) 1.1 - 2.2     TROPONIN-HIGH SENSITIVITY    Collection Time: 09/15/22  2:10 PM   Result Value Ref Range    Troponin-High Sensitivity 1,990 (HH) 0 - 76 ng/L   LACTIC ACID    Collection Time: 09/15/22  2:10 PM   Result Value Ref Range    Lactic acid 2.4 (HH) 0.4 - 2.0 mmol/L   PTT    Collection Time: 09/15/22  2:10 PM   Result Value Ref Range    aPTT 39.7 (H) 21.2 - 34.1 sec    aPTT, therapeutic range   82 - 109 sec   EKG, 12 LEAD, INITIAL    Collection Time: 09/15/22  2:35 PM   Result Value Ref Range    Ventricular Rate 101 BPM    Atrial Rate 101 BPM    P-R Interval 139 ms    QRS Duration 80 ms    Q-T Interval 424 ms    QTC Calculation (Bezet) 550 ms    Calculated P Axis 67 degrees    Calculated R Axis 44 degrees    Diagnosis       Sinus tachycardia  Ventricular premature complex  Low voltage, extremity leads  Prolonged QT interval  Baseline wander in lead(s) II,III,aVR,aVF     CULTURE, BLOOD    Collection Time: 09/15/22  2:50 PM    Specimen: Blood   Result Value Ref Range    Special Requests: No Special Requests      Culture result: No growth after 15 hours     CULTURE, BLOOD    Collection Time: 09/15/22  2:55 PM    Specimen: Blood   Result Value Ref Range    Special Requests: No Special Requests      Culture result: No growth after 15 hours     COVID-19 WITH INFLUENZA A/B    Collection Time: 09/15/22  3:16 PM   Result Value Ref Range    SARS-CoV-2 by PCR Not Detected Not Detected      Influenza A by PCR Not Detected Not Detected      Influenza B by PCR Not Detected Not Detected     TROPONIN-HIGH SENSITIVITY    Collection Time: 09/15/22  4:50 PM   Result Value Ref Range    Troponin-High Sensitivity 1,981 (HH) 0 - 76 ng/L   LACTIC ACID    Collection Time: 09/15/22  5:00 PM   Result Value Ref Range    Lactic acid 2.4 (HH) 0.4 - 2.0 mmol/L   URINALYSIS W/ RFLX MICROSCOPIC    Collection Time: 09/15/22  5:06 PM   Result Value Ref Range    Color Yellow/Straw      Appearance Hazy (A) Clear      Specific gravity >1.030 (H) 1.003 - 1.030    pH (UA) 5.5 5.0 - 8.0      Protein 30 (A) Negative mg/dL    Glucose Negative Negative mg/dL    Ketone Negative Negative mg/dL    Blood Negative Negative      Urobilinogen 0.2 0.2 - 1.0 EU/dL    Nitrites Negative Negative      Leukocyte Esterase Negative Negative     BILIRUBIN, CONFIRM    Collection Time: 09/15/22  5:06 PM   Result Value Ref Range    Bilirubin UA, confirm Positive (A) Negative     URINE MICROSCOPIC    Collection Time: 09/15/22  5:06 PM   Result Value Ref Range    WBC 0-4 0 - 5 /hpf RBC 0-5 0 - 3 /hpf    Bacteria 2+ (A) Negative /hpf    Amorphous Crystals 1+ (A) Negative    Spermatozoa Present (A) Negative     CBC WITH AUTOMATED DIFF    Collection Time: 09/15/22  6:29 PM   Result Value Ref Range    WBC 14.6 (H) 4.1 - 11.1 K/uL    RBC 3.26 (L) 4.10 - 5.70 M/uL    HGB 9.1 (L) 12.1 - 17.0 g/dL    HCT 27.8 (L) 36.6 - 50.3 %    MCV 85.3 80.0 - 99.0 FL    MCH 27.9 26.0 - 34.0 PG    MCHC 32.7 30.0 - 36.5 g/dL    RDW 13.8 11.5 - 14.5 %    PLATELET 171 652 - 054 K/uL    MPV 9.4 8.9 - 12.9 FL    NRBC 0.0 0.0  WBC    ABSOLUTE NRBC 0.00 0.00 - 0.01 K/uL    NEUTROPHILS 83 (H) 32 - 75 %    LYMPHOCYTES 8 (L) 12 - 49 %    MONOCYTES 7 5 - 13 %    EOSINOPHILS 0 0 - 7 %    BASOPHILS 0 0 - 1 %    IMMATURE GRANULOCYTES 2 (H) 0 - 0.5 %    ABS. NEUTROPHILS 12.2 (H) 1.8 - 8.0 K/UL    ABS. LYMPHOCYTES 1.1 0.8 - 3.5 K/UL    ABS. MONOCYTES 1.0 0.0 - 1.0 K/UL    ABS. EOSINOPHILS 0.0 0.0 - 0.4 K/UL    ABS. BASOPHILS 0.0 0.0 - 0.1 K/UL    ABS. IMM.  GRANS. 0.3 (H) 0.00 - 0.04 K/UL    DF AUTOMATED     LACTIC ACID    Collection Time: 09/15/22  6:29 PM   Result Value Ref Range    Lactic acid 2.1 (HH) 0.4 - 2.0 mmol/L   TROPONIN-HIGH SENSITIVITY    Collection Time: 09/15/22  6:29 PM   Result Value Ref Range    Troponin-High Sensitivity 1,924 (HH) 0 - 76 ng/L   PTT    Collection Time: 09/15/22  8:13 PM   Result Value Ref Range    aPTT 69.2 (H) 21.2 - 34.1 sec    aPTT, therapeutic range   82 - 109 sec   PTT    Collection Time: 09/16/22  1:33 AM   Result Value Ref Range    aPTT 48.9 (H) 21.2 - 34.1 sec    aPTT, therapeutic range   82 - 109 sec   CBC WITH AUTOMATED DIFF    Collection Time: 09/16/22  3:52 AM   Result Value Ref Range    WBC 16.2 (H) 4.1 - 11.1 K/uL    RBC 3.29 (L) 4.10 - 5.70 M/uL    HGB 9.3 (L) 12.1 - 17.0 g/dL    HCT 28.0 (L) 36.6 - 50.3 %    MCV 85.1 80.0 - 99.0 FL    MCH 28.3 26.0 - 34.0 PG    MCHC 33.2 30.0 - 36.5 g/dL    RDW 14.0 11.5 - 14.5 %    PLATELET 844 402 - 885 K/uL    MPV 9.6 8.9 - 12.9 FL NRBC 0.0 0.0  WBC    ABSOLUTE NRBC 0.00 0.00 - 0.01 K/uL    NEUTROPHILS 80 (H) 32 - 75 %    LYMPHOCYTES 9 (L) 12 - 49 %    MONOCYTES 9 5 - 13 %    EOSINOPHILS 0 0 - 7 %    BASOPHILS 0 0 - 1 %    IMMATURE GRANULOCYTES 2 (H) 0 - 0.5 %    ABS. NEUTROPHILS 12.9 (H) 1.8 - 8.0 K/UL    ABS. LYMPHOCYTES 1.5 0.8 - 3.5 K/UL    ABS. MONOCYTES 1.5 (H) 0.0 - 1.0 K/UL    ABS. EOSINOPHILS 0.0 0.0 - 0.4 K/UL    ABS. BASOPHILS 0.0 0.0 - 0.1 K/UL    ABS. IMM. GRANS. 0.4 (H) 0.00 - 0.04 K/UL    DF AUTOMATED     RENAL FUNCTION PANEL    Collection Time: 09/16/22  3:52 AM   Result Value Ref Range    Sodium 135 (L) 136 - 145 mmol/L    Potassium 4.0 3.5 - 5.1 mmol/L    Chloride 105 97 - 108 mmol/L    CO2 24 21 - 32 mmol/L    Anion gap 6 5 - 15 mmol/L    Glucose 253 (H) 65 - 100 mg/dL    BUN 21 (H) 6 - 20 mg/dL    Creatinine 1.20 0.70 - 1.30 mg/dL    BUN/Creatinine ratio 18 12 - 20      GFR est AA >60 >60 ml/min/1.73m2    GFR est non-AA 60 (L) >60 ml/min/1.73m2    Calcium 7.7 (L) 8.5 - 10.1 mg/dL    Phosphorus 2.8 2.6 - 4.7 mg/dL    Albumin 1.8 (L) 3.5 - 5.0 g/dL   TROPONIN-HIGH SENSITIVITY    Collection Time: 09/16/22  3:52 AM   Result Value Ref Range    Troponin-High Sensitivity 1,728 (HH) 0 - 76 ng/L   PTT    Collection Time: 09/16/22  7:35 AM   Result Value Ref Range    aPTT 66.5 (H) 21.2 - 34.1 sec    aPTT, therapeutic range   82 - 109 sec     CT Chest/Abd (9/15)        Physical Exam  Vitals and nursing note reviewed. Exam conducted with a chaperone present (Wife, daughter). Constitutional:       General: He is not in acute distress. Appearance: He is ill-appearing. Comments: Cachectic      HENT:      Head: Normocephalic and atraumatic. Right Ear: External ear normal.      Left Ear: External ear normal.      Nose: Nose normal.      Mouth/Throat:      Mouth: Mucous membranes are dry. Eyes:      Pupils: Pupils are equal, round, and reactive to light.    Cardiovascular:      Rate and Rhythm: Normal rate and regular rhythm. Heart sounds: No murmur heard. Pulmonary:      Effort: Pulmonary effort is normal.      Breath sounds: Normal breath sounds. Abdominal:      General: Bowel sounds are normal. There is no distension. Palpations: Abdomen is soft. Tenderness: There is no abdominal tenderness. Genitourinary:     Comments: External urinary device  Musculoskeletal:      Cervical back: Neck supple. Right lower leg: No edema. Left lower leg: No edema. Neurological:      General: No focal deficit present. Mental Status: He is alert and oriented to person, place, and time. Psychiatric:         Mood and Affect: Mood normal.         Behavior: Behavior normal.         Thought Content: Thought content normal.         Judgment: Judgment normal.     ASSESSMENT/PLAN    Probable sepsis with leukocytosis, elevated CRP and procal  Rule out UTI  Metastatic pancreatic cancer to lung and liver    Comment:  No obvious source of infection, and leukocytosis can associated with neoplasms, however, his CRP and procal is also elevated. Urinalysis showed bacteriuria but no pyuria. CXR is abnormal but consistent with metastases. No open wounds on exam.     Continue IV Zosyn  Follow-up blood cultures  Send urine culture  4. In am, repeat CBC, CRP and procal    Barrie Judd Asp, MD

## 2022-09-16 NOTE — PROGRESS NOTES
Primary Nurse William Zacarias RN and Guanakito Berry RN performed a dual skin assessment on this patient No impairment noted  Erlin score is 18

## 2022-09-16 NOTE — ED NOTES
Pt weaned off Levophed gtt at [phone]. MAP continues to maintain > 65. Pt is A&0 x4. Wife and daughter have been in the room with him all night and supportive. Heparin continues at 14 units/kg/hr. Next PTT is due at 0845. Low urinary output this shift, 350ml in 12 hours. Pt has c/o back pain, declined an air mattress but did accept Tylenol.

## 2022-09-16 NOTE — CONSULTS
Hematology and Oncology Inpatient Consult Note     Patient: Prosper Navarro MRN: [id number]  SSN: [id number]    YOB: 1950  Age: 71 y.o. Sex: male    Chief Complaint: Patient was admitted with syncopal episode     Reason for consult: Evaluation and management of patient with pancreatic cancer    Subjective:      Prosper Navarro is a 71 y.o. -American male who was found to have metastatic pancreatic cancer and was evaluated by Dr. Daniel Castillo at [facility] oncology. Patient was supposed to start on chemotherapy and next couple of weeks and was supposed to get Port-A-Cath placement. Yesterday his family noted that patient was unresponsive and patient was brought to the emergency room with syncopal episode and hypotension. Patient can only remember that he was feeling dizzy and then he blacked out. Patient's 2 daughters are at bedside and they are actively participating in his care. In ER patient was found to have hypotension and sinus tachycardia. Patient is feeling much better since admission. I saw him in ER while he is awaiting for the bed upstairs. Past Medical History:   Diagnosis Date    Diverticulosis 4/19/2022    Hypercholesterolemia     Hyperlipemia     Hypertension     Type II diabetes mellitus ([address] 4/19/2022    Weight loss 4/19/2022     -Stage IV pancreatic adenocarcinoma which was thought to be unresectable.   Patient was recommended palliative chemotherapy    Past Surgical History:   Procedure Laterality Date    COLONOSCOPY  05/13/2015    colon screen    ENDOSCOPY VISIT-OUTPATIENT  04/20/2022      Family History   Problem Relation Age of Onset    Stroke Mother     Stroke Father     Hypertension Other     High Cholesterol Other     Heart Disease Other      Social History     Tobacco Use    Smoking status: Never    Smokeless tobacco: Never   Substance Use Topics    Alcohol use: Never      Current Facility-Administered Medications   Medication Dose Route Frequency Provider Last Rate Last Admin    hydrALAZINE (APRESOLINE) 20 mg/mL injection 10 mg  10 mg IntraVENous QID PRN Cherise Fitzgerald PA        morphine injection 1 mg  1 mg IntraVENous Q4H PRN Kelvin Anand MD        oxyCODONE-acetaminophen (PERCOCET) 5-325 mg per tablet 1 Tablet  1 Tablet Oral Q4H PRN Cherise Fitzgerald PA        traMADoL (ULTRAM) tablet 50 mg  50 mg Oral Q6H PRN Cherise Fitzgerald PA        heparin 25,000 units in D5W 250 ml infusion  12-25 Units/kg/hr IntraVENous TITRATE Sharyon Francisco Javier, DO 11.8 mL/hr at 09/16/22 1651 18 Units/kg/hr at 09/16/22 1651    heparin (porcine) 1,000 unit/mL injection 3,920 Units  60 Units/kg IntraVENous PRN Alanna Francisco Javier, DO        Or    heparin (porcine) 1,000 unit/mL injection 1,960 Units  30 Units/kg IntraVENous PRN Alanna Solomoner, DO   1,960 Units at 09/16/22 1650    aspirin delayed-release tablet 81 mg  81 mg Oral DAILY Emmanuel Locke MD   81 mg at 09/16/22 1046    megestroL (MEGACE) tablet 40 mg  40 mg Oral BID Emmanuel Locke MD   40 mg at 09/16/22 1046    oxyCODONE IR (ROXICODONE) tablet 5 mg  5 mg Oral Q6H PRN Emmanuel Locke MD        mirtazapine (REMERON) tablet 15 mg  15 mg Oral QHS Emmanuel Locke MD        pantoprazole (PROTONIX) tablet 40 mg  40 mg Oral ACB Emmanuel Locke MD   40 mg at 09/16/22 1048    sodium chloride (NS) flush 5-40 mL  5-40 mL IntraVENous Q8H Emmanuel Locke MD   10 mL at 09/15/22 2200    sodium chloride (NS) flush 5-40 mL  5-40 mL IntraVENous PRN Emmanuel Locke MD        acetaminophen (TYLENOL) tablet 650 mg  650 mg Oral Q6H PRN Emmanuel Locke MD   650 mg at 09/16/22 9357    Or    acetaminophen (TYLENOL) suppository 650 mg  650 mg Rectal Q6H PRN Emmanuel Locke MD        ondansetron (ZOFRAN ODT) tablet 4 mg  4 mg Oral Q6H PRN Emmanuel Locke MD        Or    ondansetron (ZOFRAN) injection 4 mg  4 mg IntraVENous Q6H PRN Emmanuel Locke MD piperacillin-tazobactam (ZOSYN) 3.375 g in 0.9% sodium chloride (MBP/ADV) 100 mL MBP  3.375 g IntraVENous Q8H Anuradha Thornton MD 25 mL/hr at 09/16/22 1124 3.375 g at 09/16/22 1124        No Known Allergies    Review of Systems:  CONSTITUTIONAL: No fever, no chills. No repeated infections. No night sweats. Patient has generalized weakness and fatigue. HEENT: No mouth sores. No Epistaxis. No hearing impairment. No change in taste or smell sensations. CARDIOVASCULAR: No  palpitations or chest pain. No edema. Patient had syncopal episode. RESPIRATORY: No cough. No dyspnea on exertion. No Hemoptysis. No wheezing. No hoarseness of voice. GI: No nausea or vomiting. No constipation, no bright red blood per rectum. No hematemesis or melena. Patient's appetite has decreased significantly and she has lost significant weight. Patient also having diarrhea. No dysphagia. : No dysuria, no hematuria. No frequency of urination. INTEGUMENTARY: No skin rash or palpable lumps or bumps. HEMATOLOGIC: No history of easy bruisability. No gingival bleeding  NEURO: No focal weakness, No paresthesia. No headache or seizures. MUSCULOSKELETAL: He does have back pain. Objective:     Vitals:    09/16/22 1416 09/16/22 1531 09/16/22 1542 09/16/22 1550   BP:   116/66 120/84   Pulse: 99  95 98   Resp: 29  26    Temp:   98.5 °F (36.9 °C)    SpO2: 98% 95% 96%    Weight:       Height:            Physical Exam:  Constitutional: Elderly -American male looks chronically ill. Not in any acute distress or pain. He has bitemporal wasting. Eyes: Sclerae anicteric. Conjunctivae shows pallor. ENMT: Oral mucosa is moist, no thrush, mucositis, or petechiae. Neck: No adenopathy, JVD or thyromegaly. Hematologic/Lymphatic: Bilateral axillary/inguinal regions showed no adenopathy. Respiratory: Lungs are clear bilaterally. Cardiovascular: Normal sinus rhythm; no gallop or murmur; peripheral pulses are palpable.   Abdomen: Scaphoid abdomen. Patient does have upper abdominal fullness. No hepatosplenomegaly. No guarding or rigidity. Bowel sounds present. Back/Spine: No spinal tenderness; no costovertebral angle tenderness. Extremities: No edema, cyanosis or clubbing. Skin: No petechiae; no skin rash. Neurologic: Alert/oriented x 3; no focal neurological deficits. Recent Results (from the past 24 hour(s))   CBC WITH AUTOMATED DIFF    Collection Time: 09/15/22  6:29 PM   Result Value Ref Range    WBC 14.6 (H) 4.1 - 11.1 K/uL    RBC 3.26 (L) 4.10 - 5.70 M/uL    HGB 9.1 (L) 12.1 - 17.0 g/dL    HCT 27.8 (L) 36.6 - 50.3 %    MCV 85.3 80.0 - 99.0 FL    MCH 27.9 26.0 - 34.0 PG    MCHC 32.7 30.0 - 36.5 g/dL    RDW 13.8 11.5 - 14.5 %    PLATELET 898 449 - 023 K/uL    MPV 9.4 8.9 - 12.9 FL    NRBC 0.0 0.0  WBC    ABSOLUTE NRBC 0.00 0.00 - 0.01 K/uL    NEUTROPHILS 83 (H) 32 - 75 %    LYMPHOCYTES 8 (L) 12 - 49 %    MONOCYTES 7 5 - 13 %    EOSINOPHILS 0 0 - 7 %    BASOPHILS 0 0 - 1 %    IMMATURE GRANULOCYTES 2 (H) 0 - 0.5 %    ABS. NEUTROPHILS 12.2 (H) 1.8 - 8.0 K/UL    ABS. LYMPHOCYTES 1.1 0.8 - 3.5 K/UL    ABS. MONOCYTES 1.0 0.0 - 1.0 K/UL    ABS. EOSINOPHILS 0.0 0.0 - 0.4 K/UL    ABS. BASOPHILS 0.0 0.0 - 0.1 K/UL    ABS. IMM.  GRANS. 0.3 (H) 0.00 - 0.04 K/UL    DF AUTOMATED     LACTIC ACID    Collection Time: 09/15/22  6:29 PM   Result Value Ref Range    Lactic acid 2.1 (HH) 0.4 - 2.0 mmol/L   TROPONIN-HIGH SENSITIVITY    Collection Time: 09/15/22  6:29 PM   Result Value Ref Range    Troponin-High Sensitivity 1,924 (HH) 0 - 76 ng/L   PTT    Collection Time: 09/15/22  8:13 PM   Result Value Ref Range    aPTT 69.2 (H) 21.2 - 34.1 sec    aPTT, therapeutic range   82 - 109 sec   PTT    Collection Time: 09/16/22  1:33 AM   Result Value Ref Range    aPTT 48.9 (H) 21.2 - 34.1 sec    aPTT, therapeutic range   82 - 109 sec   CBC WITH AUTOMATED DIFF    Collection Time: 09/16/22  3:52 AM   Result Value Ref Range    WBC 16.2 (H) 4.1 - 11.1 K/uL    RBC 3.29 (L) 4.10 - 5.70 M/uL    HGB 9.3 (L) 12.1 - 17.0 g/dL    HCT 28.0 (L) 36.6 - 50.3 %    MCV 85.1 80.0 - 99.0 FL    MCH 28.3 26.0 - 34.0 PG    MCHC 33.2 30.0 - 36.5 g/dL    RDW 14.0 11.5 - 14.5 %    PLATELET 594 117 - 136 K/uL    MPV 9.6 8.9 - 12.9 FL    NRBC 0.0 0.0  WBC    ABSOLUTE NRBC 0.00 0.00 - 0.01 K/uL    NEUTROPHILS 80 (H) 32 - 75 %    LYMPHOCYTES 9 (L) 12 - 49 %    MONOCYTES 9 5 - 13 %    EOSINOPHILS 0 0 - 7 %    BASOPHILS 0 0 - 1 %    IMMATURE GRANULOCYTES 2 (H) 0 - 0.5 %    ABS. NEUTROPHILS 12.9 (H) 1.8 - 8.0 K/UL    ABS. LYMPHOCYTES 1.5 0.8 - 3.5 K/UL    ABS. MONOCYTES 1.5 (H) 0.0 - 1.0 K/UL    ABS. EOSINOPHILS 0.0 0.0 - 0.4 K/UL    ABS. BASOPHILS 0.0 0.0 - 0.1 K/UL    ABS. IMM.  GRANS. 0.4 (H) 0.00 - 0.04 K/UL    DF AUTOMATED     RENAL FUNCTION PANEL    Collection Time: 09/16/22  3:52 AM   Result Value Ref Range    Sodium 135 (L) 136 - 145 mmol/L    Potassium 4.0 3.5 - 5.1 mmol/L    Chloride 105 97 - 108 mmol/L    CO2 24 21 - 32 mmol/L    Anion gap 6 5 - 15 mmol/L    Glucose 253 (H) 65 - 100 mg/dL    BUN 21 (H) 6 - 20 mg/dL    Creatinine 1.20 0.70 - 1.30 mg/dL    BUN/Creatinine ratio 18 12 - 20      GFR est AA >60 >60 ml/min/1.73m2    GFR est non-AA 60 (L) >60 ml/min/1.73m2    Calcium 7.7 (L) 8.5 - 10.1 mg/dL    Phosphorus 2.8 2.6 - 4.7 mg/dL    Albumin 1.8 (L) 3.5 - 5.0 g/dL   TROPONIN-HIGH SENSITIVITY    Collection Time: 09/16/22  3:52 AM   Result Value Ref Range    Troponin-High Sensitivity 1,728 (HH) 0 - 76 ng/L   PTT    Collection Time: 09/16/22  7:35 AM   Result Value Ref Range    aPTT 66.5 (H) 21.2 - 34.1 sec    aPTT, therapeutic range   82 - 109 sec   ECHO ADULT COMPLETE    Collection Time: 09/16/22 10:45 AM   Result Value Ref Range    LV EDV A4C 55 mL    LV ESV A4C 21 mL    IVSd 1.1 (A) 0.6 - 1.0 cm    LVIDd 4.9 4.2 - 5.9 cm    LVIDs 3.4 cm    LVOT Diameter 2.0 cm    LVOT Peak Velocity 0.9 m/s    LVOT Peak Gradient 3 mmHg    LVPWd 0.9 0.6 - 1.0 cm    LV Ejection Fraction A4C 62 % LVOT Area 3.1 cm2    LA Major [place] 6.4 cm    LA Area 4C 21.9 cm2    LA Diameter 4.0 cm    AV Peak Velocity 1.3 m/s    AV Peak Gradient 6 mmHg    AV Area by Peak Velocity 2.3 cm2    Aortic Root 3.7 cm    Ascending Aorta 2.4 cm    Descending Aorta 2.1 cm    MR Peak Velocity 4.2 m/s    MR Peak Gradient 71 mmHg    MV Max Velocity 1.2 m/s    MV Peak Gradient 6 mmHg    MV E Wave Deceleration Time 148.0 ms    MV A Velocity 0.73 m/s    MV E Velocity 0.38 m/s    MV Mean Gradient 2 mmHg    MV VTI 24.4 cm    MV Mean Velocity 0.6 m/s    Est. RA Pressure 15 mmHg    TR Max Velocity 1.43 m/s    TR Peak Gradient 8 mmHg    TAPSE 1.6 (A) 1.7 cm    Fractional Shortening 2D 31 28 - 44 %    LV ESV Index A4C 12 mL/m2    LV EDV Index A4C 32 mL/m2    LVIDd Index 2.82 cm/m2    LVIDs Index 1.95 cm/m2    LV RWT Ratio 0.37     LV Mass 2D 176.0 88 - 224 g    LV Mass 2D Index 101.2 49 - 115 g/m2    MV E/A 0.52     LA Size Index 2.30 cm/m2    LA/AO Root Ratio 1.08     Ao Root Index 2.13 cm/m2    Ascending Aorta Index 1.38 cm/m2    AV Velocity Ratio 0.69     WAYLON/BSA Peak Velocity 1.3 cm2/m2    RVSP 23 mmHg    Descending Aorta Index 1.21 cm/m2    LV Ejection Fraction A2C 58 %    IVC Proxmal 2.6 cm   PROCALCITONIN    Collection Time: 09/16/22 11:07 AM   Result Value Ref Range    Procalcitonin 1.54 (H) 0 ng/mL   C REACTIVE PROTEIN, QT    Collection Time: 09/16/22 11:07 AM   Result Value Ref Range    C-Reactive protein 16.90 (H) 0.00 - 0.60 mg/dL   PTT    Collection Time: 09/16/22  3:04 PM   Result Value Ref Range    aPTT 57.3 (H) 21.2 - 34.1 sec    aPTT, therapeutic range   82 - 109 sec        CTA CHEST W OR W WO CONT   Final Result      1. Extensive right-sided acute pulmonary emboli. Borderline deviation of the   interventricular septum suggesting right heart strain   2. Metastatic disease to lungs and liver      Findings were phoned to the floor at 1531 hours to Jennifer the .  A   perfect serve text was sent to [address] [place] [address] LOWER EXT VENOUS BILAT    (Results Pending)          Assessment:     Hospital Problems  Date Reviewed: 7/11/2022            Codes Class Noted POA    Carcinoma of head of pancreas ([facility] [address] ICD-10-CM: C25.0  ICD-9-CM: 157.0  9/15/2022 Unknown        Hypotension ICD-10-CM: I95.9  ICD-9-CM: 458.9  9/15/2022 Unknown        NSTEMI (non-ST elevated myocardial infarction) [facility]) ICD-10-CM: I21.4  ICD-9-CM: 410.70  9/15/2022 Unknown           Assessment & Plan:     78-year-old -American male who was diagnosed with stage IV pancreatic cancer and undergoing work-up for starting palliative chemotherapy. Now patient is admitted with syncopal episode, pulmonary embolism, dehydration, hypotension and non-ST elevated myocardial infarction. 1) pancreatic cancer: Incurable. Patient is undergoing work-up and planning for palliative chemotherapy. He was thought to be not a candidate for FOLFIRINOX chemotherapy. 2) pulmonary embolism: On IV heparin. Pulmonary consult has been requested. His PE may be related to his pancreatic malignancy. 3) anemia: May be related to his pancreatic malignancy. We will closely monitor. 4) high troponin and non-ST elevation myocardial infarction: As per cardiology. 5) weight loss: From pancreatic cancer. Had a discussion with patient and his 2 daughters at bedside. Overall prognosis is guarded. This dictation was done by dragon, computer voice recognition software. Often unanticipated grammatical, syntax, phones and other interpretive errors are inadvertently transcribed. Please excuse errors that have escaped final proofreading.      Signed By: Sherryle Merl, MD     September 16, 2022

## 2022-09-16 NOTE — CONSULTS
Pulmonary/ CC Consult    Subjective:   Date of Consultation:  September 16, 2022  Referring Physician:   MISA Lin     Mr. Gay Chang 71years old -American male who is known to have history of hypertension and hyperlipidemia. He recently had work-up done for tremendous weight loss of about 100 pounds in last 1 year. He was diagnosed with pancreatic cancer with metastasis to liver and lungs. He was supposed to have an appointment in [address] [place] [address] for his pancreatic cancer treatment. Because of his generalized weakness he had a home health arranged  . He was noted to be hypoxic and hypotensive on evaluation by home health agency. Sent to local ER in [facility] where his chest x-ray showed right lower lobe infiltrate. Because of his hypotension and dizzy spells he was transferred to [facility].  To be tachycardic and hypotensive. He was in sinus tachycardia with heart rate of 120s. Initially was thought to have sepsis. As part of work-up CT angiogram of chest was done which showed significant sized right pulmonary embolism. He was also found to have bilateral leg DVTs. Earlier he was seen by cardiology service and IR was also consulted for his pulmonary embolism with right ventricular strain. I saw him he was alert and without any distress. Patient's family was present at bedside. Denied any hemoptysis. Denied any chest pains. Denied any high-grade fever or chills. Was also noted to have elevated troponin I level. Getting IV heparin for his acute pulmonary embolism.     Patient Active Problem List   Diagnosis Code    Weight loss R63.4    Diverticulosis K57.90    Type II diabetes mellitus (HCC) E11.9    Hypercholesterolemia E78.00    Hypertension I10    Carcinoma of head of pancreas ([facility] [address] C25.0    Hypotension I95.9    NSTEMI (non-ST elevated myocardial infarction) ([facility] [address] I21.4     Past Medical History:   Diagnosis Date    Diverticulosis 4/19/2022    Hypercholesterolemia Hyperlipemia     Hypertension     [facility] 2022    Weight loss 2022      Family History   Problem Relation Age of Onset    Stroke Mother     Stroke Father     Hypertension Other     High Cholesterol Other     Heart Disease Other       Social History     Tobacco Use    Smoking status: Never    Smokeless tobacco: Never   Substance Use Topics    Alcohol use: Never     Past Surgical History:   Procedure Laterality Date    COLONOSCOPY  2015    colon screen    ENDOSCOPY VISIT-OUTPATIENT  2022      Prior to Admission medications    Medication Sig Start Date End Date Taking? Authorizing Provider   oxyCODONE IR (ROXICODONE) 5 mg immediate release tablet Take 5 mg by mouth every six (6) hours as needed for Severe Pain. 22  Yes Provider, Historical   mirtazapine (REMERON) 15 mg tablet Take 15 mg by mouth nightly. 22  Yes Provider, Historical   omeprazole (PRILOSEC) 40 mg capsule Take 40 mg by mouth Daily (before breakfast). 22  Yes Provider, Historical   valsartan (DIOVAN) 320 mg tablet Take 320 mg by mouth daily. Yes Provider, Historical   tamsulosin (FLOMAX) 0.4 mg capsule Take 0.4 mg by mouth daily. Yes Provider, Historical   aspirin delayed-release 81 mg tablet Take 81 mg by mouth daily. Yes Provider, Historical   amLODIPine (NORVASC) 10 mg tablet Take 10 mg by mouth daily. 22  Yes Provider, Historical   rosuvastatin (CRESTOR) 40 mg tablet Take 40 mg by mouth daily. 22  Yes Provider, Historical     No Known Allergies     Review of Systems: All 10 systems were reviewed. Positive pertinent findings are mentioned above. Rest of the examination review essentially unremarkable    Objective:   Blood pressure 120/84, pulse 98, temperature 98.5 °F (36.9 °C), resp. rate 26, height 5' 6\" (1.676 m), weight 65.3 kg (144 lb), SpO2 96 %.   Temp (24hrs), Av.1 °F (36.7 °C), Min:97.8 °F (36.6 °C), Max:98.5 °F (36.9 °C)    CTA CHEST W OR W WO CONT   Final Result      1. Extensive right-sided acute pulmonary emboli. Borderline deviation of the   interventricular septum suggesting right heart strain   2. Metastatic disease to lungs and liver      Findings were phoned to the floor at 1531 hours to Jennifer the . A   perfect serve text was sent to [facility] [address] EXT VENOUS BILAT    (Results Pending)      Data Review: CBC:   Recent Labs     09/16/22  0352 09/15/22  1829 09/15/22  1410   WBC 16.2* 14.6* 19.9*   RBC 3.29* 3.26* 3.98*   HGB 9.3* 9.1* 11.1*   HCT 28.0* 27.8* 33.7*    191 291   GRANS 80* 83* 86*   LYMPH 9* 8* 6*   EOS 0 0 0*     Liver Enzymes:   Recent Labs     09/16/22  0352 09/15/22  1410   TP  --  7.3   ALB 1.8* 2.3*   AP  --  145*     ABGs: No results for input(s): PH, PCO2, PO2, HCO3 in the last 72 hours.   Current Facility-Administered Medications   Medication Dose Route Frequency    hydrALAZINE (APRESOLINE) 20 mg/mL injection 10 mg  10 mg IntraVENous QID PRN    morphine injection 1 mg  1 mg IntraVENous Q4H PRN    oxyCODONE-acetaminophen (PERCOCET) 5-325 mg per tablet 1 Tablet  1 Tablet Oral Q4H PRN    traMADoL (ULTRAM) tablet 50 mg  50 mg Oral Q6H PRN    heparin 25,000 units in D5W 250 ml infusion  12-25 Units/kg/hr IntraVENous TITRATE    heparin (porcine) 1,000 unit/mL injection 3,920 Units  60 Units/kg IntraVENous PRN    Or    heparin (porcine) 1,000 unit/mL injection 1,960 Units  30 Units/kg IntraVENous PRN    aspirin delayed-release tablet 81 mg  81 mg Oral DAILY    megestroL (MEGACE) tablet 40 mg  40 mg Oral BID    oxyCODONE IR (ROXICODONE) tablet 5 mg  5 mg Oral Q6H PRN    mirtazapine (REMERON) tablet 15 mg  15 mg Oral QHS    pantoprazole (PROTONIX) tablet 40 mg  40 mg Oral ACB    sodium chloride (NS) flush 5-40 mL  5-40 mL IntraVENous Q8H    sodium chloride (NS) flush 5-40 mL  5-40 mL IntraVENous PRN    acetaminophen (TYLENOL) tablet 650 mg  650 mg Oral Q6H PRN    Or    acetaminophen (TYLENOL) suppository 650 mg 650 mg Rectal Q6H PRN    ondansetron (ZOFRAN ODT) tablet 4 mg  4 mg Oral Q6H PRN    Or    ondansetron (ZOFRAN) injection 4 mg  4 mg IntraVENous Q6H PRN    piperacillin-tazobactam (ZOSYN) 3.375 g in 0.9% sodium chloride (MBP/ADV) 100 mL MBP  3.375 g IntraVENous Q8H        Exam:      This is an elderly male who is on nasal cannula oxygen. Currently is not in any distress. He is alert and oriented x3. Head normocephalic and atraumatic, pupils are round responsive to light sclera icteric and conjunctive are pink. Neck is supple. No cervical lymphadenopathy. Thyroid not enlarged  Chest: Fair entry of air bilaterally. Few basal rhonchi audible at right lung base. No wheezing was appreciated  Heart: S1-S2 normal, and is tachycardic  Abdomen: Soft, nontender, no visceromegaly  Extremities: No edema, sinus or clubbing  Neuro: No focal motor deficit. Impression: This is an elderly male who has known history of hypertension and hyperlipidemia. As a part of his recent weight loss he underwent work-up including CT scan of abdomen which showed pancreatic cancer with metastasis to liver and lungs. He was admitted because of hypotension and hypoxia. His CT scan of chest showed tensive right lung pulmonary embolism with right ventricular strain. Plan:   1. Acute pulmonary embolism  Patient has acute right lung extensive pulm embolism with right ventricular strain. Preliminary results of his leg Doppler showed bilateral DVT. Was earlier seen by IR. He was ruled out to be thrombectomy candidate. He is not a candidate for tPA because of his extensive metastasis to liver and lungs. CT scan of head is not available to see if he has any metastasis to his brain. We will treat him with IV heparin, keep his PTT between 55 and 70  He may qualify for IVC filter placement  2. Pancreatic cancer with metastasis to liver and lungs  Supportive care. Continue supplemental oxygen.     Goals of care needs to be discussed with patient and family considering his advanced stage of disease.   Case discussed with cardiologist and primary care team.  Thank you for involving me in the management of the patient      Catherine Sanchez MD  Pulmonary/CC

## 2022-09-16 NOTE — PROGRESS NOTES
Admission Medication Reconciliation:    Information obtained from:  Patient's daughter    Comments/Recommendations: Reviewed PTA medications and patient's allergies. Removed megestrol 40 mg        Allergies:  Patient has no known allergies. Significant PMH/Disease States:   Past Medical History:   Diagnosis Date    Diverticulosis 4/19/2022    Hypercholesterolemia     Hyperlipemia     Hypertension     Type II diabetes mellitus ([facility] [address] 4/19/2022    Weight loss 4/19/2022     Chief Complaint for this Admission:    Chief Complaint   Patient presents with    Transfer Of Care     Prior to Admission Medications:   Prior to Admission Medications   Prescriptions Last Dose Informant Patient Reported? Taking? amLODIPine (NORVASC) 10 mg tablet  Child Yes Yes   Sig: Take 10 mg by mouth daily. aspirin delayed-release 81 mg tablet  Child Yes Yes   Sig: Take 81 mg by mouth daily. mirtazapine (REMERON) 15 mg tablet  Child Yes Yes   Sig: Take 15 mg by mouth nightly. omeprazole (PRILOSEC) 40 mg capsule  Child Yes Yes   Sig: Take 40 mg by mouth Daily (before breakfast). oxyCODONE IR (ROXICODONE) 5 mg immediate release tablet  Child Yes Yes   Sig: Take 5 mg by mouth every six (6) hours as needed for Severe Pain. rosuvastatin (CRESTOR) 40 mg tablet  Child Yes Yes   Sig: Take 40 mg by mouth daily. tamsulosin (FLOMAX) 0.4 mg capsule  Child Yes Yes   Sig: Take 0.4 mg by mouth daily. valsartan (DIOVAN) 320 mg tablet  Child Yes Yes   Sig: Take 320 mg by mouth daily.       Facility-Administered Medications: None       Sisi Wilson

## 2022-09-16 NOTE — ED PROVIDER NOTES
EMERGENCY DEPARTMENT HISTORY AND PHYSICAL EXAM      Date: 9/15/2022  Patient Name: Prosper Navarro    History of Presenting Illness     Chief Complaint   Patient presents with    Transfer Of Care       History Provided By: Patient    HPI: Prosper Navarro, 71 y.o. male with past medical history significant for pancreatic cancer, hyperlipidemia, hypertension, diabetes presenting to the emergency department as transfer from outside facility for evaluation of NSTEMI as well as pneumonia and sepsis. At outside facility, patient found to have significantly elevated troponin. Subsequently started on heparin drip. Antibiotics initiated for suspected pneumonia. Patient did receive 30 cc/kg bolus at outside facility. On arrival to this facility, patient reports no complaints. States that family made him seek evaluation at outside facility. There are no other complaints, changes, or physical findings at this time.     PCP: Branden Holland MD    Current Facility-Administered Medications   Medication Dose Route Frequency Provider Last Rate Last Admin    heparin 25,000 units in D5W 250 ml infusion  12-25 Units/kg/hr IntraVENous TITRATE Alanna Mcintyre DO 7.8 mL/hr at 09/15/22 1822 12 Units/kg/hr at 09/15/22 1822    heparin (porcine) 1,000 unit/mL injection 3,920 Units  60 Units/kg IntraVENous PRN Alanna Mcintyre DO        Or    heparin (porcine) 1,000 unit/mL injection 1,960 Units  30 Units/kg IntraVENous PRN Alanna Mcintyre, DO        [START ON 9/16/2022] aspirin delayed-release tablet 81 mg  81 mg Oral DAILY Emmanuel Locke MD        megestroL (MEGACE) tablet 40 mg  40 mg Oral BID Emmanuel Locke MD        oxyCODONE IR (ROXICODONE) tablet 5 mg  5 mg Oral Q6H PRN Emmanuel Locke MD        mirtazapine (REMERON) tablet 15 mg  15 mg Oral QHS Emmanuel Locke MD        [START ON 9/16/2022] pantoprazole (PROTONIX) tablet 40 mg  40 mg Oral ACB Emmanuel Locke MD piperacillin-tazobactam (ZOSYN) 4.5 g in 0.9% sodium chloride (MBP/ADV) 100 mL MBP  4.5 g IntraVENous Q6H Mag Varghese MD        heparin 25,000 units in D5W 250 ml infusion  12-25 Units/kg/hr IntraVENous TITRATE Mag Varghese MD        sodium chloride (NS) flush 5-40 mL  5-40 mL IntraVENous Q8H Mag Varghese MD        sodium chloride (NS) flush 5-40 mL  5-40 mL IntraVENous PRN Mag Varghese MD        acetaminophen (TYLENOL) tablet 650 mg  650 mg Oral Q6H PRN Mag Varghese MD        Or    acetaminophen (TYLENOL) suppository 650 mg  650 mg Rectal Q6H PRN Mag Varghese MD        ondansetron (ZOFRAN ODT) tablet 4 mg  4 mg Oral Q6H PRN Mag Varghese MD        Or    ondansetron (ZOFRAN) injection 4 mg  4 mg IntraVENous Q6H PRN Mag Varghese MD         Current Outpatient Medications   Medication Sig Dispense Refill    oxyCODONE IR (ROXICODONE) 5 mg immediate release tablet Take 5 mg by mouth every six (6) hours as needed for Severe Pain.      mirtazapine (REMERON) 15 mg tablet Take 15 mg by mouth nightly. omeprazole (PRILOSEC) 40 mg capsule Take 40 mg by mouth Daily (before breakfast). megestroL (MEGACE) 40 mg tablet Take 1 Tablet by mouth two (2) times a day. 60 Tablet 3    valsartan (DIOVAN) 320 mg tablet Take  by mouth daily. tamsulosin (FLOMAX) 0.4 mg capsule Take 0.4 mg by mouth daily. aspirin delayed-release 81 mg tablet Take  by mouth daily.       amLODIPine (NORVASC) 10 mg tablet       rosuvastatin (CRESTOR) 40 mg tablet          Past History   Past Medical History:  Past Medical History:   Diagnosis Date    Diverticulosis 4/19/2022    Hypercholesterolemia     Hyperlipemia     Hypertension     Type II diabetes mellitus ([facility] [address] 4/19/2022    Weight loss 4/19/2022       Past Surgical History:  Past Surgical History:   Procedure Laterality Date    COLONOSCOPY  05/13/2015    colon screen    ENDOSCOPY VISIT-OUTPATIENT  04/20/2022 Family History:  Family History   Problem Relation Age of Onset    Stroke Mother     Stroke Father     Hypertension Other     High Cholesterol Other     Heart Disease Other        Social History:  Social History     Tobacco Use    Smoking status: Never    Smokeless tobacco: Never   Vaping Use    Vaping Use: Never used   Substance Use Topics    Alcohol use: Never    Drug use: Never       Allergies:  No Known Allergies  Review of Systems   Review of Systems   Constitutional:  Negative for chills and fever. HENT:  Negative for congestion and rhinorrhea. Eyes:  Negative for photophobia and visual disturbance. Respiratory:  Negative for cough and shortness of breath. Cardiovascular:  Negative for chest pain and palpitations. Gastrointestinal:  Negative for abdominal pain, diarrhea, nausea and vomiting. Genitourinary:  Negative for difficulty urinating and dysuria. Musculoskeletal:  Negative for arthralgias and myalgias. Skin:  Negative for color change and rash. Neurological:  Negative for weakness and headaches. Psychiatric/Behavioral:  Negative for dysphoric mood and sleep disturbance. Physical Exam   Physical Exam  Constitutional:       General: He is not in acute distress. Appearance: Normal appearance. He is ill-appearing (chronically). HENT:      Head: Normocephalic and atraumatic. Right Ear: External ear normal.      Left Ear: External ear normal.      Nose: Nose normal.      Mouth/Throat:      Mouth: Mucous membranes are moist.   Eyes:      Extraocular Movements: Extraocular movements intact. Conjunctiva/sclera: Conjunctivae normal.      Pupils: Pupils are equal, round, and reactive to light. Cardiovascular:      Rate and Rhythm: Normal rate and regular rhythm. Pulses: Normal pulses. Pulmonary:      Effort: Pulmonary effort is normal. No respiratory distress. Breath sounds: Normal breath sounds. Abdominal:      General: Abdomen is flat.  There is no distension. Musculoskeletal:         General: Normal range of motion. Cervical back: Normal range of motion. Skin:     General: Skin is warm and dry. Neurological:      General: No focal deficit present. Mental Status: He is alert and oriented to person, place, and time. Psychiatric:         Mood and Affect: Mood normal.         Behavior: Behavior normal.         Thought Content: Thought content normal.         Judgment: Judgment normal.       Lab and Diagnostic Study Results   Labs -     Recent Results (from the past 12 hour(s))   CBC WITH AUTOMATED DIFF    Collection Time: 09/15/22  2:10 PM   Result Value Ref Range    WBC 19.9 (H) 4.4 - 11.3 K/uL    RBC 3.98 (L) 4.50 - 5.90 M/uL    HGB 11.1 (L) 13.5 - 17.5 g/dL    HCT 33.7 (L) 41 - 53 %    MCV 84.8 80 - 100 FL    MCH 27.9 (L) 31 - 34 PG    MCHC 32.9 31.0 - 36.0 g/dL    RDW 14.7 (H) 11.5 - 14.5 %    PLATELET 979 257 - 250 K/uL    MPV 7.0 6.5 - 11.5 FL    NRBC 0.2  WBC    ABSOLUTE NRBC 0.04 K/uL    NEUTROPHILS 86 (H) 42 - 75 %    LYMPHOCYTES 6 (L) 20.5 - 51.1 %    MONOCYTES 8 1.7 - 9.3 %    EOSINOPHILS 0 (L) 0.9 - 2.9 %    BASOPHILS 0 0.0 - 2.5 %    ABS. NEUTROPHILS 17.2 (H) 1.8 - 7.7 K/UL    ABS. LYMPHOCYTES 1.2 1.0 - 4.8 K/UL    ABS. MONOCYTES 1.5 0.2 - 2.4 K/UL    ABS. EOSINOPHILS 0.0 0.0 - 0.7 K/UL    ABS. BASOPHILS 0.0 0.0 - 0.2 K/UL   METABOLIC PANEL, COMPREHENSIVE    Collection Time: 09/15/22  2:10 PM   Result Value Ref Range    Sodium 135 (L) 136 - 145 mmol/L    Potassium 3.7 3.5 - 5.1 mmol/L    Chloride 98 97 - 108 mmol/L    CO2 26 21 - 32 mmol/L    Anion gap 11 5 - 15 mmol/L    Glucose 137 (H) 65 - 100 mg/dL    BUN 22 (H) 6 - 20 mg/dL    Creatinine 1.77 (H) 0.70 - 1.30 mg/dL    BUN/Creatinine ratio 12 12 - 20      GFR est AA 46 (L) >60 ml/min/1.73m2    GFR est non-AA 38 (L) >60 ml/min/1.73m2    Calcium 8.9 8.5 - 10.1 mg/dL    Bilirubin, total 1.0 0.2 - 1.0 mg/dL    AST (SGOT) 36 15 - 37 U/L    ALT (SGPT) 17 12 - 78 U/L    Alk. phosphatase 145 (H) 45 - 117 U/L    Protein, total 7.3 6.4 - 8.2 g/dL    Albumin 2.3 (L) 3.5 - 5.0 g/dL    Globulin 5.0 (H) 2.0 - 4.0 g/dL    A-G Ratio 0.5 (L) 1.1 - 2.2     TROPONIN-HIGH SENSITIVITY    Collection Time: 09/15/22  2:10 PM   Result Value Ref Range    Troponin-High Sensitivity 1,990 (HH) 0 - 76 ng/L   LACTIC ACID    Collection Time: 09/15/22  2:10 PM   Result Value Ref Range    Lactic acid 2.4 (HH) 0.4 - 2.0 mmol/L   PTT    Collection Time: 09/15/22  2:10 PM   Result Value Ref Range    aPTT 39.7 (H) 21.2 - 34.1 sec    aPTT, therapeutic range   82 - 109 sec   EKG, 12 LEAD, INITIAL    Collection Time: 09/15/22  2:35 PM   Result Value Ref Range    Ventricular Rate 101 BPM    Atrial Rate 101 BPM    P-R Interval 139 ms    QRS Duration 80 ms    Q-T Interval 424 ms    QTC Calculation (Bezet) 550 ms    Calculated P Axis 67 degrees    Calculated R Axis 44 degrees    Diagnosis       Sinus tachycardia  Ventricular premature complex  Low voltage, extremity leads  Prolonged QT interval  Baseline wander in lead(s) II,III,aVR,aVF     COVID-19 WITH INFLUENZA A/B    Collection Time: 09/15/22  3:16 PM   Result Value Ref Range    SARS-CoV-2 by PCR Not Detected Not Detected      Influenza A by PCR Not Detected Not Detected      Influenza B by PCR Not Detected Not Detected     TROPONIN-HIGH SENSITIVITY    Collection Time: 09/15/22  4:50 PM   Result Value Ref Range    Troponin-High Sensitivity 1,981 (HH) 0 - 76 ng/L   LACTIC ACID    Collection Time: 09/15/22  5:00 PM   Result Value Ref Range    Lactic acid 2.4 (HH) 0.4 - 2.0 mmol/L   URINALYSIS W/ RFLX MICROSCOPIC    Collection Time: 09/15/22  5:06 PM   Result Value Ref Range    Color Yellow/Straw      Appearance Hazy (A) Clear      Specific gravity >1.030 (H) 1.003 - 1.030    pH (UA) 5.5 5.0 - 8.0      Protein 30 (A) Negative mg/dL    Glucose Negative Negative mg/dL    Ketone Negative Negative mg/dL    Blood Negative Negative      Urobilinogen 0.2 0.2 - 1.0 EU/dL Nitrites Negative Negative      Leukocyte Esterase Negative Negative     BILIRUBIN, CONFIRM    Collection Time: 09/15/22  5:06 PM   Result Value Ref Range    Bilirubin UA, confirm Positive (A) Negative     URINE MICROSCOPIC    Collection Time: 09/15/22  5:06 PM   Result Value Ref Range    WBC 0-4 0 - 5 /hpf    RBC 0-5 0 - 3 /hpf    Bacteria 2+ (A) Negative /hpf    Amorphous Crystals 1+ (A) Negative    Spermatozoa Present (A) Negative     CBC WITH AUTOMATED DIFF    Collection Time: 09/15/22  6:29 PM   Result Value Ref Range    WBC 14.6 (H) 4.1 - 11.1 K/uL    RBC 3.26 (L) 4.10 - 5.70 M/uL    HGB 9.1 (L) 12.1 - 17.0 g/dL    HCT 27.8 (L) 36.6 - 50.3 %    MCV 85.3 80.0 - 99.0 FL    MCH 27.9 26.0 - 34.0 PG    MCHC 32.7 30.0 - 36.5 g/dL    RDW 13.8 11.5 - 14.5 %    PLATELET 138 924 - 727 K/uL    MPV 9.4 8.9 - 12.9 FL    NRBC 0.0 0.0  WBC    ABSOLUTE NRBC 0.00 0.00 - 0.01 K/uL    NEUTROPHILS 83 (H) 32 - 75 %    LYMPHOCYTES 8 (L) 12 - 49 %    MONOCYTES 7 5 - 13 %    EOSINOPHILS 0 0 - 7 %    BASOPHILS 0 0 - 1 %    IMMATURE GRANULOCYTES 2 (H) 0 - 0.5 %    ABS. NEUTROPHILS 12.2 (H) 1.8 - 8.0 K/UL    ABS. LYMPHOCYTES 1.1 0.8 - 3.5 K/UL    ABS. MONOCYTES 1.0 0.0 - 1.0 K/UL    ABS. EOSINOPHILS 0.0 0.0 - 0.4 K/UL    ABS. BASOPHILS 0.0 0.0 - 0.1 K/UL    ABS. IMM. GRANS. 0.3 (H) 0.00 - 0.04 K/UL    DF AUTOMATED     LACTIC ACID    Collection Time: 09/15/22  6:29 PM   Result Value Ref Range    Lactic acid 2.1 (HH) 0.4 - 2.0 mmol/L   TROPONIN-HIGH SENSITIVITY    Collection Time: 09/15/22  6:29 PM   Result Value Ref Range    Troponin-High Sensitivity 1,924 (HH) 0 - 76 ng/L       Radiologic Studies -   [unfilled]  CT Results  (Last 48 hours)                 09/15/22 1648  CT CHEST WO CONT Final result    Impression:      1. Rapid progression of metastatic disease. 2. Metastatic pulmonary nodules are better imaged but grossly unchanged. 3. Progression of hepatic metastatic disease. New and increased size of hepatic   masses. 4. New splenic infarcts versus splenic masses. 5. Ill-defined pancreatic head mass. The CBD stent is in good position. Narrative:  EXAM: CT CHEST WO CONT, CT ABD PELV WO CONT       INDICATION: Syncope, nonspecific pulmonary opacities. History of pancreatic and   lung cancer. COMPARISON: No comparison CT chest. CT abdomen on 8/4/2022. TECHNIQUE: Helical CT of the chest, abdomen, and pelvis without IV contrast.   Oral contrast was not utilized. Coronal and sagittal reformats were generated. CT dose reduction was achieved through use of a standardized protocol tailored   for this examination and automatic exposure control for dose modulation. Absence   of intravenous contrast limits evaluation of the mediastinum, munir, vasculature,   and solid organs. FINDINGS:       THYROID: No nodule. MEDIASTINUM: No mass or lymphadenopathy. MUNIR: No gross mass. THORACIC AORTA: Atherosclerosis without aneurysm. MAIN PULMONARY ARTERY: 3.6 cm in transverse diameter and larger than the   ascending aorta. Findings are compatible with chronic pulmonary hypertension. HEART: Small size, small pericardial effusion, no change. Coronary artery   calcification atherosclerosis is better imaged and extensive. ESOPHAGUS: No wall thickening or dilatation. TRACHEA/BRONCHI: Patent. PLEURA: No effusion or pneumothorax. LUNGS: Pulmonary nodules and nodular opacities are better imaged and grossly   unchanged. Anterior right upper lobe nodule measures 1.3 cm. Anterior right   lower lobe nodular opacity measures 2.2 cm. Lateral apical left upper lobe   nodular opacity measures 0.8 cm. Posterior left lower lobe nodular opacity   measures 1.3 cm. No fibrosis. Liver: Increased size and number of ill-defined masses. Segment 8 mass   previously measured 2.1 cm and now measures 4.6 cm. Segment 3 mass previously   measured 2.3 cm and now measures 5.3 cm. New segment 5 mass measures 7.6 cm.    Pneumobilia is new. Biliary tree: Gallbladder contains gas. CBD stent is new and in good position. Spleen: Heterogeneous splenomegaly with no evidence of infarcts or masses. Limited evaluation without contrast.   Pancreas: Ill-defined pancreatic head and body mass is grossly unchanged. Limited evaluation without IV contrast.   Adrenals: Bilateral hypertrophy. No change. Kidneys: No mass or hydronephrosis. Bilateral renal cysts are unchanged. Stomach: Incomplete distention, limited evaluation. Small bowel: No obstruction. Colon: Incomplete distention, limited evaluation. Appendix: Normal.   Peritoneum: No ascites or pneumoperitoneum. Retroperitoneum: Aortic atherosclerosis without aneurysm. No lymphadenopathy. Reproductive organs: Heterogeneous prostatomegaly contains calcifications. Urinary bladder: No mass or calculus. Bones: No destructive bone lesion. Abdominal wall: No mass or hernia. Additional comments: N/A           09/15/22 1648  CT ABD PELV WO CONT Final result    Impression:      1. Rapid progression of metastatic disease. 2. Metastatic pulmonary nodules are better imaged but grossly unchanged. 3. Progression of hepatic metastatic disease. New and increased size of hepatic   masses. 4. New splenic infarcts versus splenic masses. 5. Ill-defined pancreatic head mass. The CBD stent is in good position. Narrative:  EXAM: CT CHEST WO CONT, CT ABD PELV WO CONT       INDICATION: Syncope, nonspecific pulmonary opacities. History of pancreatic and   lung cancer. COMPARISON: No comparison CT chest. CT abdomen on 8/4/2022. TECHNIQUE: Helical CT of the chest, abdomen, and pelvis without IV contrast.   Oral contrast was not utilized. Coronal and sagittal reformats were generated. CT dose reduction was achieved through use of a standardized protocol tailored   for this examination and automatic exposure control for dose modulation.  Absence   of intravenous contrast limits evaluation of the mediastinum, munir, vasculature,   and solid organs. FINDINGS:       THYROID: No nodule. MEDIASTINUM: No mass or lymphadenopathy. MUNIR: No gross mass. THORACIC AORTA: Atherosclerosis without aneurysm. MAIN PULMONARY ARTERY: 3.6 cm in transverse diameter and larger than the   ascending aorta. Findings are compatible with chronic pulmonary hypertension. HEART: Small size, small pericardial effusion, no change. Coronary artery   calcification atherosclerosis is better imaged and extensive. ESOPHAGUS: No wall thickening or dilatation. TRACHEA/BRONCHI: Patent. PLEURA: No effusion or pneumothorax. LUNGS: Pulmonary nodules and nodular opacities are better imaged and grossly   unchanged. Anterior right upper lobe nodule measures 1.3 cm. Anterior right   lower lobe nodular opacity measures 2.2 cm. Lateral apical left upper lobe   nodular opacity measures 0.8 cm. Posterior left lower lobe nodular opacity   measures 1.3 cm. No fibrosis. Liver: Increased size and number of ill-defined masses. Segment 8 mass   previously measured 2.1 cm and now measures 4.6 cm. Segment 3 mass previously   measured 2.3 cm and now measures 5.3 cm. New segment 5 mass measures 7.6 cm. Pneumobilia is new. Biliary tree: Gallbladder contains gas. CBD stent is new and in good position. Spleen: Heterogeneous splenomegaly with no evidence of infarcts or masses. Limited evaluation without contrast.   Pancreas: Ill-defined pancreatic head and body mass is grossly unchanged. Limited evaluation without IV contrast.   Adrenals: Bilateral hypertrophy. No change. Kidneys: No mass or hydronephrosis. Bilateral renal cysts are unchanged. Stomach: Incomplete distention, limited evaluation. Small bowel: No obstruction. Colon: Incomplete distention, limited evaluation. Appendix: Normal.   Peritoneum: No ascites or pneumoperitoneum. Retroperitoneum: Aortic atherosclerosis without aneurysm.  No lymphadenopathy. Reproductive organs: Heterogeneous prostatomegaly contains calcifications. Urinary bladder: No mass or calculus. Bones: No destructive bone lesion. Abdominal wall: No mass or hernia. Additional comments: N/A                 CXR Results  (Last 48 hours)                 09/15/22 1524  XR CHEST PORT Final result    Impression:  Patchy diffuse mild lung infiltrates and interstitial prominence, new since   8/4/2022 at the level of the lung bases which were clear at that time. .   Correlate for infection versus developing edema. .           Narrative:  INDICATION:  Syncope, history of pancreatic and lung cancer. EXAM: Chest single view. COMPARISON: CT abdomen 8/4/2022. FINDINGS: A single frontal view of the chest at 1359 hours shows patchy   bilateral lung infiltrates and interstitial prominence, with moderate lung   volumes. Linear density projecting over the right lung is thought to represent a   skin fold. .  The heart, mediastinum and pulmonary vasculature are normal  . The   tracheal air shadow is normal. .  The bony thorax is unremarkable for age. .                   Medical Decision Making and ED Course   Differential Diagnosis & Medical Decision Making Provider Note:   78-year-old male presenting for evaluation of NSTEMI and pneumonia as diagnosed at outside facility. Patient without complaints upon arrival to this emergency department. He arrives with heparin drip in place. Repeat EKG at this facility without signs of ischemia. Troponin elevated, however no significant change from prior. Lactic acid downtrending. Patient given 1 L normal saline IV bolus upon arrival due to borderline hypotension. Signed out to admitting physician, Dr. Jung Degree    - I am the first and primary provider for this patient. I reviewed the vital signs, available nursing notes, past medical history, past surgical history, family history and social history.  The patient's presenting problems have been discussed, and the staff are in agreement with the care plan formulated and outlined with them. I have encouraged them to ask questions as they arise throughout their visit. Vital Signs-Reviewed the patient's vital signs. Patient Vitals for the past 12 hrs:   Temp Pulse Resp BP SpO2   09/15/22 1900 -- 94 18 (!) 82/58 99 %   09/15/22 1845 -- 96 17 (!) 90/59 100 %   09/15/22 1838 -- -- -- -- 100 %   09/15/22 1830 -- 96 20 (!) 84/57 100 %   09/15/22 1813 98 °F (36.7 °C) 99 23 93/62 100 %       EKG interpretation: (Preliminary): EKG Interpreted by me. Shows sinus rhythm with a ventricular rate of 92, , QRS 88, QTc 479 without evidence ST depression elevation. ED Course:        Procedures and Critical Care     Performed by: Cisco Sanchez DO  Procedures      Disposition   Disposition: Admitted to Floor Medical Floor the case was discussed with the admitting physician Elsi    Diagnosis/Clinical Impression     Clinical Impression:   1. NSTEMI (non-ST elevated myocardial infarction) ([facility] [address]    2. Pneumonia of both lungs due to infectious organism, unspecified part of lung        Attestations: I, Cisco Sanchez DO, am the primary clinician of record. Please note that this dictation was completed with [employer], the computer voice recognition software. Quite often unanticipated grammatical, syntax, homophones, and other interpretive errors are inadvertently transcribed by the computer software. Please disregard these errors. Please excuse any errors that have escaped final proofreading. Thank you.

## 2022-09-17 LAB
ALBUMIN SERPL-MCNC: 1.7 G/DL (ref 3.5–5)
ALBUMIN/GLOB SERPL: 0.3 {RATIO} (ref 1.1–2.2)
ALP SERPL-CCNC: 153 U/L (ref 45–117)
ALT SERPL-CCNC: 22 U/L (ref 12–78)
ANION GAP SERPL CALC-SCNC: 6 MMOL/L (ref 5–15)
APTT PPP: 62.7 SEC (ref 21.2–34.1)
APTT PPP: 66.6 SEC (ref 21.2–34.1)
AST SERPL W P-5'-P-CCNC: 45 U/L (ref 15–37)
BASOPHILS # BLD: 0 K/UL (ref 0–0.1)
BASOPHILS NFR BLD: 0 % (ref 0–1)
BILIRUB SERPL-MCNC: 0.8 MG/DL (ref 0.2–1)
BUN SERPL-MCNC: 11 MG/DL (ref 6–20)
BUN/CREAT SERPL: 15 (ref 12–20)
CA-I BLD-MCNC: 8.2 MG/DL (ref 8.5–10.1)
CHLORIDE SERPL-SCNC: 107 MMOL/L (ref 97–108)
CO2 SERPL-SCNC: 25 MMOL/L (ref 21–32)
CREAT SERPL-MCNC: 0.71 MG/DL (ref 0.7–1.3)
CRP SERPL-MCNC: 16.3 MG/DL (ref 0–0.6)
DIFFERENTIAL METHOD BLD: ABNORMAL
EOSINOPHIL # BLD: 0 K/UL (ref 0–0.4)
EOSINOPHIL NFR BLD: 0 % (ref 0–7)
ERYTHROCYTE [DISTWIDTH] IN BLOOD BY AUTOMATED COUNT: 14.1 % (ref 11.5–14.5)
GLOBULIN SER CALC-MCNC: 4.9 G/DL (ref 2–4)
GLUCOSE SERPL-MCNC: 109 MG/DL (ref 65–100)
HCT VFR BLD AUTO: 31.7 % (ref 36.6–50.3)
HGB BLD-MCNC: 10.4 G/DL (ref 12.1–17)
IMM GRANULOCYTES # BLD AUTO: 0.2 K/UL (ref 0–0.04)
IMM GRANULOCYTES NFR BLD AUTO: 1 % (ref 0–0.5)
LYMPHOCYTES # BLD: 1.2 K/UL (ref 0.8–3.5)
LYMPHOCYTES NFR BLD: 9 % (ref 12–49)
MCH RBC QN AUTO: 28.3 PG (ref 26–34)
MCHC RBC AUTO-ENTMCNC: 32.8 G/DL (ref 30–36.5)
MCV RBC AUTO: 86.4 FL (ref 80–99)
MONOCYTES # BLD: 1.1 K/UL (ref 0–1)
MONOCYTES NFR BLD: 8 % (ref 5–13)
NEUTS SEG # BLD: 11.6 K/UL (ref 1.8–8)
NEUTS SEG NFR BLD: 82 % (ref 32–75)
NRBC # BLD: 0 K/UL (ref 0–0.01)
NRBC BLD-RTO: 0 PER 100 WBC
PLATELET # BLD AUTO: 313 K/UL (ref 150–400)
PMV BLD AUTO: 9.7 FL (ref 8.9–12.9)
POTASSIUM SERPL-SCNC: 3.6 MMOL/L (ref 3.5–5.1)
PROCALCITONIN SERPL-MCNC: 1.1 NG/ML
PROT SERPL-MCNC: 6.6 G/DL (ref 6.4–8.2)
RBC # BLD AUTO: 3.67 M/UL (ref 4.1–5.7)
SODIUM SERPL-SCNC: 138 MMOL/L (ref 136–145)
THERAPEUTIC RANGE,PTTT: ABNORMAL SEC (ref 82–109)
THERAPEUTIC RANGE,PTTT: ABNORMAL SEC (ref 82–109)
WBC # BLD AUTO: 14.2 K/UL (ref 4.1–11.1)

## 2022-09-17 PROCEDURE — 36415 COLL VENOUS BLD VENIPUNCTURE: CPT

## 2022-09-17 PROCEDURE — 80053 COMPREHEN METABOLIC PANEL: CPT

## 2022-09-17 PROCEDURE — 74011250636 HC RX REV CODE- 250/636: Performed by: HOSPITALIST

## 2022-09-17 PROCEDURE — 84145 PROCALCITONIN (PCT): CPT

## 2022-09-17 PROCEDURE — 86140 C-REACTIVE PROTEIN: CPT

## 2022-09-17 PROCEDURE — 74011000258 HC RX REV CODE- 258: Performed by: HOSPITALIST

## 2022-09-17 PROCEDURE — 85730 THROMBOPLASTIN TIME PARTIAL: CPT

## 2022-09-17 PROCEDURE — 74011000250 HC RX REV CODE- 250: Performed by: HOSPITALIST

## 2022-09-17 PROCEDURE — 74011250636 HC RX REV CODE- 250/636: Performed by: EMERGENCY MEDICINE

## 2022-09-17 PROCEDURE — 99232 SBSQ HOSP IP/OBS MODERATE 35: CPT | Performed by: INTERNAL MEDICINE

## 2022-09-17 PROCEDURE — 85025 COMPLETE CBC W/AUTO DIFF WBC: CPT

## 2022-09-17 PROCEDURE — 74011250637 HC RX REV CODE- 250/637: Performed by: HOSPITALIST

## 2022-09-17 PROCEDURE — 65270000029 HC RM PRIVATE

## 2022-09-17 RX ADMIN — ACETAMINOPHEN 650 MG: 325 TABLET, FILM COATED ORAL at 09:27

## 2022-09-17 RX ADMIN — SODIUM CHLORIDE, PRESERVATIVE FREE 10 ML: 5 INJECTION INTRAVENOUS at 21:36

## 2022-09-17 RX ADMIN — PIPERACILLIN AND TAZOBACTAM 3.38 G: 3; .375 INJECTION, POWDER, LYOPHILIZED, FOR SOLUTION INTRAVENOUS at 20:18

## 2022-09-17 RX ADMIN — PANTOPRAZOLE SODIUM 40 MG: 20 TABLET, DELAYED RELEASE ORAL at 09:27

## 2022-09-17 RX ADMIN — HEPARIN SODIUM 1960 UNITS: 1000 INJECTION, SOLUTION INTRAVENOUS; SUBCUTANEOUS at 06:52

## 2022-09-17 RX ADMIN — MEGESTROL ACETATE 40 MG: 20 TABLET ORAL at 09:27

## 2022-09-17 RX ADMIN — MEGESTROL ACETATE 40 MG: 20 TABLET ORAL at 20:18

## 2022-09-17 RX ADMIN — HEPARIN SODIUM 1960 UNITS: 1000 INJECTION, SOLUTION INTRAVENOUS; SUBCUTANEOUS at 16:33

## 2022-09-17 RX ADMIN — MIRTAZAPINE 15 MG: 15 TABLET, FILM COATED ORAL at 21:35

## 2022-09-17 RX ADMIN — ASPIRIN 81 MG: 81 TABLET, COATED ORAL at 09:27

## 2022-09-17 RX ADMIN — PIPERACILLIN AND TAZOBACTAM 3.38 G: 3; .375 INJECTION, POWDER, LYOPHILIZED, FOR SOLUTION INTRAVENOUS at 12:06

## 2022-09-17 RX ADMIN — PIPERACILLIN AND TAZOBACTAM 3.38 G: 3; .375 INJECTION, POWDER, LYOPHILIZED, FOR SOLUTION INTRAVENOUS at 04:02

## 2022-09-17 RX ADMIN — SODIUM CHLORIDE, PRESERVATIVE FREE 5 ML: 5 INJECTION INTRAVENOUS at 05:11

## 2022-09-17 RX ADMIN — SODIUM CHLORIDE, PRESERVATIVE FREE 10 ML: 5 INJECTION INTRAVENOUS at 16:26

## 2022-09-17 NOTE — PROGRESS NOTES
Problem: Falls - Risk of  Goal: *Absence of Falls  Description: Document Flakita Villalobos Fall Risk and appropriate interventions in the flowsheet.   Outcome: Progressing Towards Goal  Note: Fall Risk Interventions:            Medication Interventions: Bed/chair exit alarm, Evaluate medications/consider consulting pharmacy, Patient to call before getting OOB, Teach patient to arise slowly    Elimination Interventions: Bed/chair exit alarm, Call light in reach, Toilet paper/wipes in reach, Toileting schedule/hourly rounds (External cath in place)              Problem: Patient Education: Go to Patient Education Activity  Goal: Patient/Family Education  Outcome: Progressing Towards Goal

## 2022-09-17 NOTE — PROGRESS NOTES
Problem: Falls - Risk of  Goal: *Absence of Falls  Description: Document Starleen Freeze Fall Risk and appropriate interventions in the flowsheet. Outcome: Progressing Towards Goal  Note: Fall Risk Interventions:            Medication Interventions: Bed/chair exit alarm    Elimination Interventions: Bed/chair exit alarm, Call light in reach              Problem: Patient Education: Go to Patient Education Activity  Goal: Patient/Family Education  Outcome: Progressing Towards Goal     Problem: Pressure Injury - Risk of  Goal: *Prevention of pressure injury  Description: Document Erlin Scale and appropriate interventions in the flowsheet.   Outcome: Progressing Towards Goal  Note: Pressure Injury Interventions:       Moisture Interventions: Absorbent underpads    Activity Interventions: Increase time out of bed, PT/OT evaluation    Mobility Interventions: Float heels, HOB 30 degrees or less    Nutrition Interventions: Document food/fluid/supplement intake    Friction and Shear Interventions: Apply protective barrier, creams and emollients, HOB 30 degrees or less, Minimize layers, Transferring/repositioning devices                Problem: Patient Education: Go to Patient Education Activity  Goal: Patient/Family Education  Outcome: Progressing Towards Goal

## 2022-09-17 NOTE — PROGRESS NOTES
Pulmonary/ CC progress note    Subjective:   Date of Consultation:  2022  Referring Physician:   MISA Castellon     Subjective    Patient seen and examined  Overnight events noted    Lying in bed comfortably  On 2 L nasal cannula oxygen  Family at bedside  No acute distress  On IV heparin     Prior to Admission medications    Medication Sig Start Date End Date Taking? Authorizing Provider   oxyCODONE IR (ROXICODONE) 5 mg immediate release tablet Take 5 mg by mouth every six (6) hours as needed for Severe Pain. 22  Yes Provider, Historical   mirtazapine (REMERON) 15 mg tablet Take 15 mg by mouth nightly. 22  Yes Provider, Historical   omeprazole (PRILOSEC) 40 mg capsule Take 40 mg by mouth Daily (before breakfast). 22  Yes Provider, Historical   valsartan (DIOVAN) 320 mg tablet Take 320 mg by mouth daily. Yes Provider, Historical   tamsulosin (FLOMAX) 0.4 mg capsule Take 0.4 mg by mouth daily. Yes Provider, Historical   aspirin delayed-release 81 mg tablet Take 81 mg by mouth daily. Yes Provider, Historical   amLODIPine (NORVASC) 10 mg tablet Take 10 mg by mouth daily. 22  Yes Provider, Historical   rosuvastatin (CRESTOR) 40 mg tablet Take 40 mg by mouth daily. 22  Yes Provider, Historical     No Known Allergies     Review of Systems: All 10 systems were reviewed. Positive pertinent findings are mentioned above. Rest of the examination review essentially unremarkable    Objective:   Blood pressure 135/75, pulse 90, temperature 98.1 °F (36.7 °C), resp. rate 18, height 5' 6\" (1.676 m), weight 65.3 kg (144 lb), SpO2 96 %. Temp (24hrs), Av.3 °F (36.8 °C), Min:97.8 °F (36.6 °C), Max:99.4 °F (37.4 °C)    DUPLEX LOWER EXT VENOUS BILAT   Final Result      CTA CHEST W OR W WO CONT   Final Result      1. Extensive right-sided acute pulmonary emboli. Borderline deviation of the   interventricular septum suggesting right heart strain   2.  Metastatic disease to lungs and liver      Findings were phoned to the floor at 1531 hours to Jennifer the . A   perfect serve text was sent to [facility]         Data Review: CBC:   Recent Labs     09/17/22  0504 09/16/22  0352 09/15/22  1829   WBC 14.2* 16.2* 14.6*   RBC 3.67* 3.29* 3.26*   HGB 10.4* 9.3* 9.1*   HCT 31.7* 28.0* 27.8*    356 191   GRANS 82* 80* 83*   LYMPH 9* 9* 8*   EOS 0 0 0       Liver Enzymes:   Recent Labs     09/17/22  0504   TP 6.6   ALB 1.7*   *       ABGs: No results for input(s): PH, PCO2, PO2, HCO3 in the last 72 hours.   Current Facility-Administered Medications   Medication Dose Route Frequency    hydrALAZINE (APRESOLINE) 20 mg/mL injection 10 mg  10 mg IntraVENous QID PRN    morphine injection 1 mg  1 mg IntraVENous Q4H PRN    oxyCODONE-acetaminophen (PERCOCET) 5-325 mg per tablet 1 Tablet  1 Tablet Oral Q4H PRN    traMADoL (ULTRAM) tablet 50 mg  50 mg Oral Q6H PRN    heparin 25,000 units in D5W 250 ml infusion  12-25 Units/kg/hr IntraVENous TITRATE    heparin (porcine) 1,000 unit/mL injection 3,920 Units  60 Units/kg IntraVENous PRN    Or    heparin (porcine) 1,000 unit/mL injection 1,960 Units  30 Units/kg IntraVENous PRN    aspirin delayed-release tablet 81 mg  81 mg Oral DAILY    megestroL (MEGACE) tablet 40 mg  40 mg Oral BID    oxyCODONE IR (ROXICODONE) tablet 5 mg  5 mg Oral Q6H PRN    mirtazapine (REMERON) tablet 15 mg  15 mg Oral QHS    pantoprazole (PROTONIX) tablet 40 mg  40 mg Oral ACB    sodium chloride (NS) flush 5-40 mL  5-40 mL IntraVENous Q8H    sodium chloride (NS) flush 5-40 mL  5-40 mL IntraVENous PRN    acetaminophen (TYLENOL) tablet 650 mg  650 mg Oral Q6H PRN    Or    acetaminophen (TYLENOL) suppository 650 mg  650 mg Rectal Q6H PRN    ondansetron (ZOFRAN ODT) tablet 4 mg  4 mg Oral Q6H PRN    Or    ondansetron (ZOFRAN) injection 4 mg  4 mg IntraVENous Q6H PRN    piperacillin-tazobactam (ZOSYN) 3.375 g in 0.9% sodium chloride (MBP/ADV) 100 mL MBP  3.375 g IntraVENous Q8H        Exam:      This is an elderly male who is on nasal cannula oxygen. Currently is not in any distress. He is alert and oriented x3. Head normocephalic and atraumatic, pupils are round responsive to light sclera icteric and conjunctive are pink. Neck is supple. No cervical lymphadenopathy. Thyroid not enlarged  Chest: Fair entry of air bilaterally. Few basal rhonchi audible at right lung base. No wheezing was appreciated  Heart: S1-S2 normal, and is tachycardic  Abdomen: Soft, nontender, no visceromegaly  Extremities: No edema, sinus or clubbing  Neuro: No focal motor deficit. Impression: This is an elderly male who has known history of hypertension and hyperlipidemia. As a part of his recent weight loss he underwent work-up including CT scan of abdomen which showed pancreatic cancer with metastasis to liver and lungs. He was admitted because of hypotension and hypoxia. His CT scan of chest showed tensive right lung pulmonary embolism with right ventricular strain. Plan:   1. Acute pulmonary embolism  Patient has acute right lung extensive pulm embolism with right ventricular strain. Preliminary results of his leg Doppler showed bilateral DVT. Was earlier seen by IR. He was ruled out to be thrombectomy candidate. He is not a candidate for tPA because of his extensive metastasis to liver and lungs. CT scan of head is not available to see if he has any metastasis to his brain. We will treat him with IV heparin, keep his PTT between 55 and 70  He may qualify for IVC filter placement    2. Pancreatic cancer with metastasis to liver and lungs  Supportive care. Continue supplemental oxygen. Goals of care needs to be discussed with patient and family considering his advanced stage of disease.     Questions of patient were answered at bedside in detail  Case discussed in detail with RN, RT, and care team  Thank you for involving me in the care of this patient  I will follow with you closely during hospitalization    Time spent more than 30 minutes under patient care with no overlap reviewing results and records, decision making, and answering questions.     Shakeel Locke MD  Pulmonary/CC

## 2022-09-17 NOTE — PROGRESS NOTES
Resting comfortably on nasal oxygen  No chest pain    PE  VSS  Clear lungs  No murmur or gallop  No edema    Echo with RV strain pattern  CT findings reviewed  Telem - sinus    Large PE in the setting of metatstatic pancreatic cancer, RV strain. Agree with iv heparin, transitioning to oral [address]  Pulm following     No active CV issues.     Will see again as needed    Rita Heredia MD

## 2022-09-17 NOTE — PROGRESS NOTES
Hematology and Oncology Progress Note    Patient: Makenna Pulido MRN: [id number]  SSN: [id number]    YOB: 1950  Age: 71 y.o. Sex: male      Admit Date: 9/15/2022    LOS: 2 days     Chief Complaint: Patient is feeling somewhat better    Subjective:   Patient is feeling somewhat better. His breathing is better. He has no dizziness or chest pain. He has no palpitation. He has no mucosal bleeding. His appetite is decreased but no nausea or vomiting today. ROS: Patient has no fever or chills. He has no mouth sores. He has lost weight. He has some dyspnea on exertion. He has somewhat leg swelling. No focal weakness or any headache. No urinary complaints. Objective:     Vitals:    09/17/22 0023 09/17/22 0359 09/17/22 0400 09/17/22 0802   BP: 124/71 129/74  135/75   Pulse: 97 94 95 90   Resp: 16 18  18   Temp: 99.4 °F (37.4 °C) 98 °F (36.7 °C)  98.1 °F (36.7 °C)   SpO2: 95% 98%  96%   Weight:       Height:            Physical Exam:  Constitutional: Elderly -American male looks chronically ill. Not in any acute distress or pain. He has bitemporal wasting. Eyes: Sclerae anicteric. Conjunctivae shows pallor. ENMT: Oral mucosa is moist, no thrush, mucositis, or petechiae. Neck: No adenopathy, JVD or thyromegaly. Respiratory: Lungs are clear bilaterally. Cardiovascular: Normal sinus rhythm. Abdomen: Scaphoid abdomen. Patient does have upper abdominal fullness. No hepatosplenomegaly. No guarding or rigidity. Bowel sounds present. Extremities: No edema. Skin: No petechiae; no skin rash. Neurologic: Alert/oriented x 3.     Lab/Data Review:    Recent Results (from the past 24 hour(s))   ECHO ADULT COMPLETE    Collection Time: 09/16/22 10:45 AM   Result Value Ref Range    LV EDV A4C 55 mL    LV ESV A4C 21 mL    IVSd 1.1 (A) 0.6 - 1.0 cm    LVIDd 4.9 4.2 - 5.9 cm    LVIDs 3.4 cm    LVOT Diameter 2.0 cm    LVOT Peak Velocity 0.9 m/s    LVOT Peak Gradient 3 mmHg    LVPWd 0.9 0.6 - 1.0 cm    LV Ejection Fraction A4C 62 %    LVOT Area 3.1 cm2    LA [place] 6.4 cm    LA Area 4C 21.9 cm2    LA Diameter 4.0 cm    AV Peak Velocity 1.3 m/s    AV Peak Gradient 6 mmHg    AV Area by Peak Velocity 2.3 cm2    Aortic Root 3.7 cm    Ascending Aorta 2.4 cm    Descending Aorta 2.1 cm    MR Peak Velocity 4.2 m/s    MR Peak Gradient 71 mmHg    MV Max Velocity 1.2 m/s    MV Peak Gradient 6 mmHg    MV E Wave Deceleration Time 148.0 ms    MV A Velocity 0.73 m/s    MV E Velocity 0.38 m/s    MV Mean Gradient 2 mmHg    MV VTI 24.4 cm    MV Mean Velocity 0.6 m/s    Est. RA Pressure 15 mmHg    TR Max Velocity 1.43 m/s    TR Peak Gradient 8 mmHg    TAPSE 1.6 (A) 1.7 cm    Fractional Shortening 2D 31 28 - 44 %    LV ESV Index A4C 12 mL/m2    LV EDV Index A4C 32 mL/m2    LVIDd Index 2.82 cm/m2    LVIDs Index 1.95 cm/m2    LV RWT Ratio 0.37     LV Mass 2D 176.0 88 - 224 g    LV Mass 2D Index 101.2 49 - 115 g/m2    MV E/A 0.52     LA Size Index 2.30 cm/m2    LA/AO Root Ratio 1.08     Ao Root Index 2.13 cm/m2    Ascending Aorta Index 1.38 cm/m2    AV Velocity Ratio 0.69     WAYLON/BSA Peak Velocity 1.3 cm2/m2    RVSP 23 mmHg    Descending Aorta Index 1.21 cm/m2    LV Ejection Fraction A2C 58 %    IVC Proxmal 2.6 cm   PROCALCITONIN    Collection Time: 09/16/22 11:07 AM   Result Value Ref Range    Procalcitonin 1.54 (H) 0 ng/mL   C REACTIVE PROTEIN, QT    Collection Time: 09/16/22 11:07 AM   Result Value Ref Range    C-Reactive protein 16.90 (H) 0.00 - 0.60 mg/dL   PTT    Collection Time: 09/16/22  3:04 PM   Result Value Ref Range    aPTT 57.3 (H) 21.2 - 34.1 sec    aPTT, therapeutic range   82 - 109 sec   PTT    Collection Time: 09/16/22  9:13 PM   Result Value Ref Range    aPTT 67.9 (H) 21.2 - 34.1 sec    aPTT, therapeutic range   sec   METABOLIC PANEL, COMPREHENSIVE    Collection Time: 09/17/22  5:04 AM   Result Value Ref Range    Sodium 138 136 - 145 mmol/L    Potassium 3.6 3.5 - 5.1 mmol/L    Chloride 107 97 - 108 mmol/L    CO2 25 21 - 32 mmol/L    Anion gap 6 5 - 15 mmol/L    Glucose 109 (H) 65 - 100 mg/dL    BUN 11 6 - 20 mg/dL    Creatinine 0.71 0.70 - 1.30 mg/dL    BUN/Creatinine ratio 15 12 - 20      GFR est AA >60 >60 ml/min/1.73m2    GFR est non-AA >60 >60 ml/min/1.73m2    Calcium 8.2 (L) 8.5 - 10.1 mg/dL    Bilirubin, total 0.8 0.2 - 1.0 mg/dL    AST (SGOT) 45 (H) 15 - 37 U/L    ALT (SGPT) 22 12 - 78 U/L    Alk. phosphatase 153 (H) 45 - 117 U/L    Protein, total 6.6 6.4 - 8.2 g/dL    Albumin 1.7 (L) 3.5 - 5.0 g/dL    Globulin 4.9 (H) 2.0 - 4.0 g/dL    A-G Ratio 0.3 (L) 1.1 - 2.2     CBC WITH AUTOMATED DIFF    Collection Time: 09/17/22  5:04 AM   Result Value Ref Range    WBC 14.2 (H) 4.1 - 11.1 K/uL    RBC 3.67 (L) 4.10 - 5.70 M/uL    HGB 10.4 (L) 12.1 - 17.0 g/dL    HCT 31.7 (L) 36.6 - 50.3 %    MCV 86.4 80.0 - 99.0 FL    MCH 28.3 26.0 - 34.0 PG    MCHC 32.8 30.0 - 36.5 g/dL    RDW 14.1 11.5 - 14.5 %    PLATELET 229 015 - 514 K/uL    MPV 9.7 8.9 - 12.9 FL    NRBC 0.0 0.0  WBC    ABSOLUTE NRBC 0.00 0.00 - 0.01 K/uL    NEUTROPHILS 82 (H) 32 - 75 %    LYMPHOCYTES 9 (L) 12 - 49 %    MONOCYTES 8 5 - 13 %    EOSINOPHILS 0 0 - 7 %    BASOPHILS 0 0 - 1 %    IMMATURE GRANULOCYTES 1 (H) 0 - 0.5 %    ABS. NEUTROPHILS 11.6 (H) 1.8 - 8.0 K/UL    ABS. LYMPHOCYTES 1.2 0.8 - 3.5 K/UL    ABS. MONOCYTES 1.1 (H) 0.0 - 1.0 K/UL    ABS. EOSINOPHILS 0.0 0.0 - 0.4 K/UL    ABS. BASOPHILS 0.0 0.0 - 0.1 K/UL    ABS. IMM.  GRANS. 0.2 (H) 0.00 - 0.04 K/UL    DF AUTOMATED     PROCALCITONIN    Collection Time: 09/17/22  5:04 AM   Result Value Ref Range    Procalcitonin 1.10 (H) 0 ng/mL   PTT    Collection Time: 09/17/22  5:04 AM   Result Value Ref Range    aPTT 66.6 (H) 21.2 - 34.1 sec    aPTT, therapeutic range   82 - 109 sec   C REACTIVE PROTEIN, QT    Collection Time: 09/17/22  5:04 AM   Result Value Ref Range    C-Reactive protein 16.30 (H) 0.00 - 0.60 mg/dL           Assessment and plan:     71-year-old -American male who was diagnosed with stage IV pancreatic cancer and undergoing work-up for starting palliative chemotherapy. Now patient is admitted with syncopal episode, pulmonary embolism, dehydration, hypotension and non-ST elevated myocardial infarction. 1) pancreatic cancer: Incurable. Patient is undergoing work-up and planning for palliative chemotherapy. He was thought to be not a candidate for FOLFIRINOX chemotherapy. 2) pulmonary embolism: On IV heparin. Pulmonary consult has been requested. His PE may be related to his pancreatic malignancy.  -Patient was thought to be not a candidate for thrombolytic therapy or thrombectomy. Patient is on IV heparin. Can be discharged on Eliquis. 3) anemia: May be related to his pancreatic malignancy. We will closely monitor.  -Hemoglobin is 10.4 today     4) high troponin and non-ST elevation myocardial infarction: As per cardiology. 5) weight loss: From pancreatic cancer. 6) leukocytosis: Most likely reactive. We will continue to monitor it. 9/17: I have discussed my recommendations with patient, patient's daughter and other family member at bedside. This dictation was done by dragon, computer voice recognition software. Often unanticipated grammatical, syntax, phones and other interpretive errors are inadvertently transcribed. Please excuse errors that have escaped final proofreading.        Signed By: Denis Rodrigez MD     September 17, 2022

## 2022-09-17 NOTE — PROGRESS NOTES
Subjective:   Daily Progress Note: 9/17/2022 8:31 AM    Subjective: Feels well; updated family, answered questions at bedside. Evaluated by Pulm, Nataly, IR. On IV Hep. Tolerating. Follows with [facility] for Pancreatic CA mgmt.  ------------------------------    9/16/22: Patient having back pain not controlled with tylenol. Patient does not use oxygen at home. Patient denies hemoptysis, wheezing, cough, or chest pain. Addendum:    CTA of the chest with extensive right-sided pulmonary embolism with borderline deviation of the interventricular septum and metastatic disease to lungs and liver. IR, vascular, and pulmonology consulted. IR, Dr. Galo Watkins, recommending heparin drip with no acute intervention at this time. Vascular unavailable. Duplex pending.     Current Facility-Administered Medications   Medication Dose Route Frequency    hydrALAZINE (APRESOLINE) 20 mg/mL injection 10 mg  10 mg IntraVENous QID PRN    morphine injection 1 mg  1 mg IntraVENous Q4H PRN    oxyCODONE-acetaminophen (PERCOCET) 5-325 mg per tablet 1 Tablet  1 Tablet Oral Q4H PRN    traMADoL (ULTRAM) tablet 50 mg  50 mg Oral Q6H PRN    heparin 25,000 units in D5W 250 ml infusion  12-25 Units/kg/hr IntraVENous TITRATE    heparin (porcine) 1,000 unit/mL injection 3,920 Units  60 Units/kg IntraVENous PRN    Or    heparin (porcine) 1,000 unit/mL injection 1,960 Units  30 Units/kg IntraVENous PRN    aspirin delayed-release tablet 81 mg  81 mg Oral DAILY    megestroL (MEGACE) tablet 40 mg  40 mg Oral BID    oxyCODONE IR (ROXICODONE) tablet 5 mg  5 mg Oral Q6H PRN    mirtazapine (REMERON) tablet 15 mg  15 mg Oral QHS    pantoprazole (PROTONIX) tablet 40 mg  40 mg Oral ACB    sodium chloride (NS) flush 5-40 mL  5-40 mL IntraVENous Q8H    sodium chloride (NS) flush 5-40 mL  5-40 mL IntraVENous PRN    acetaminophen (TYLENOL) tablet 650 mg  650 mg Oral Q6H PRN    Or    acetaminophen (TYLENOL) suppository 650 mg  650 mg Rectal Q6H PRN    ondansetron (ZOFRAN ODT) tablet 4 mg  4 mg Oral Q6H PRN    Or    ondansetron (ZOFRAN) injection 4 mg  4 mg IntraVENous Q6H PRN    piperacillin-tazobactam (ZOSYN) 3.375 g in 0.9% sodium chloride (MBP/ADV) 100 mL MBP  3.375 g IntraVENous Q8H        Review of Systems  Review of Systems   Constitutional: Negative. Respiratory:  Positive for shortness of breath. Negative for cough and wheezing. Cardiovascular: Negative. Gastrointestinal: Negative. Musculoskeletal:  Positive for back pain. All other systems reviewed and are negative. Objective:     Visit Vitals  /74 (BP Patient Position: At rest;Lying left side)   Pulse 95   Temp 98.2 °F (36.8 °C)   Resp 18   Ht 5' 6\" (1.676 m)   Wt 65.3 kg (144 lb)   SpO2 100%   BMI 23.24 kg/m²    O2 Flow Rate (L/min): 2 l/min O2 Device: Nasal cannula    Temp (24hrs), Av.4 °F (36.9 °C), Min:98 °F (36.7 °C), Max:99.4 °F (37.4 °C)      No intake/output data recorded. 09/15 1901 -  0700  In: 1175 [I.V.:1175]  Out: 200 [Urine:200]    DUPLEX LOWER EXT VENOUS BILAT   Final Result      CTA CHEST W OR W WO CONT   Final Result      1. Extensive right-sided acute pulmonary emboli. Borderline deviation of the   interventricular septum suggesting right heart strain   2. Metastatic disease to lungs and liver      Findings were phoned to the floor at 1531 hours to Jennifer the . A   perfect serve text was sent to [id number]:    Physical Exam  Vitals and nursing note reviewed. Constitutional:       Comments: Thinly built   HENT:      Head: Normocephalic and atraumatic. Cardiovascular:      Rate and Rhythm: Normal rate and regular rhythm. Pulmonary:      Effort: No respiratory distress. Breath sounds: No wheezing. Abdominal:      General: Bowel sounds are normal. There is no distension. Palpations: Abdomen is soft. Tenderness: There is no abdominal tenderness.    Genitourinary:     Comments: No tovar present  Musculoskeletal: Right lower leg: No edema. Left lower leg: No edema. Neurological:      Mental Status: He is alert and oriented to person, place, and time. Psychiatric:         Mood and Affect: Mood normal.        Data Review    Recent Results (from the past 24 hour(s))   PTT    Collection Time: 09/16/22  9:13 PM   Result Value Ref Range    aPTT 67.9 (H) 21.2 - 34.1 sec    aPTT, therapeutic range   sec   METABOLIC PANEL, COMPREHENSIVE    Collection Time: 09/17/22  5:04 AM   Result Value Ref Range    Sodium 138 136 - 145 mmol/L    Potassium 3.6 3.5 - 5.1 mmol/L    Chloride 107 97 - 108 mmol/L    CO2 25 21 - 32 mmol/L    Anion gap 6 5 - 15 mmol/L    Glucose 109 (H) 65 - 100 mg/dL    BUN 11 6 - 20 mg/dL    Creatinine 0.71 0.70 - 1.30 mg/dL    BUN/Creatinine ratio 15 12 - 20      GFR est AA >60 >60 ml/min/1.73m2    GFR est non-AA >60 >60 ml/min/1.73m2    Calcium 8.2 (L) 8.5 - 10.1 mg/dL    Bilirubin, total 0.8 0.2 - 1.0 mg/dL    AST (SGOT) 45 (H) 15 - 37 U/L    ALT (SGPT) 22 12 - 78 U/L    Alk. phosphatase 153 (H) 45 - 117 U/L    Protein, total 6.6 6.4 - 8.2 g/dL    Albumin 1.7 (L) 3.5 - 5.0 g/dL    Globulin 4.9 (H) 2.0 - 4.0 g/dL    A-G Ratio 0.3 (L) 1.1 - 2.2     CBC WITH AUTOMATED DIFF    Collection Time: 09/17/22  5:04 AM   Result Value Ref Range    WBC 14.2 (H) 4.1 - 11.1 K/uL    RBC 3.67 (L) 4.10 - 5.70 M/uL    HGB 10.4 (L) 12.1 - 17.0 g/dL    HCT 31.7 (L) 36.6 - 50.3 %    MCV 86.4 80.0 - 99.0 FL    MCH 28.3 26.0 - 34.0 PG    MCHC 32.8 30.0 - 36.5 g/dL    RDW 14.1 11.5 - 14.5 %    PLATELET 854 673 - 037 K/uL    MPV 9.7 8.9 - 12.9 FL    NRBC 0.0 0.0  WBC    ABSOLUTE NRBC 0.00 0.00 - 0.01 K/uL    NEUTROPHILS 82 (H) 32 - 75 %    LYMPHOCYTES 9 (L) 12 - 49 %    MONOCYTES 8 5 - 13 %    EOSINOPHILS 0 0 - 7 %    BASOPHILS 0 0 - 1 %    IMMATURE GRANULOCYTES 1 (H) 0 - 0.5 %    ABS. NEUTROPHILS 11.6 (H) 1.8 - 8.0 K/UL    ABS. LYMPHOCYTES 1.2 0.8 - 3.5 K/UL    ABS. MONOCYTES 1.1 (H) 0.0 - 1.0 K/UL    ABS.  EOSINOPHILS 0.0 0.0 - 0.4 K/UL    ABS. BASOPHILS 0.0 0.0 - 0.1 K/UL    ABS. IMM. GRANS. 0.2 (H) 0.00 - 0.04 K/UL    DF AUTOMATED     PROCALCITONIN    Collection Time: 09/17/22  5:04 AM   Result Value Ref Range    Procalcitonin 1.10 (H) 0 ng/mL   PTT    Collection Time: 09/17/22  5:04 AM   Result Value Ref Range    aPTT 66.6 (H) 21.2 - 34.1 sec    aPTT, therapeutic range   82 - 109 sec   C REACTIVE PROTEIN, QT    Collection Time: 09/17/22  5:04 AM   Result Value Ref Range    C-Reactive protein 16.30 (H) 0.00 - 0.60 mg/dL   PTT    Collection Time: 09/17/22 12:54 PM   Result Value Ref Range    aPTT 62.7 (H) 21.2 - 34.1 sec    aPTT, therapeutic range   82 - 109 sec        Assessment/Plan:     Hospital Course:    Rapheal Lesch is a 71year old male with a PMH of metastatic pancreatic cancer, diabetes, and hypertension who presents with syncope via EMS. EMS found sinus tachycardia with hypotension and started on IV fluids. In ED hypotensive. Despite given 3 L of IV fluids in total patient remained hypotensive and was started on Levophed. Initial labs significant for WBC of 14.6, hemoglobin of 9.1, creatinine 1.77, lactic of 2.4, troponin of 1990, BUN of 22. Urinalysis with bacteria, amorphous crystals, and spermatozoa. CXR with patchy diffuse mild lung infiltrates and interstitial prominence. CT of the abdomen showed progression of hepatic metastatic disease, new splenic infarct versus splenic masses, CBD stent is in good position. CT of the chest showed grossly unchanged pulmonary nodules and nodular opacities without fibrosis. Patient was admitted for further management. Overnight levophed was weaned and BP remained stable off of Levophed. Patient was started on heparin drip and Zosyn. Cardiology, ID, and oncology consulted. CTA of the chest with extensive right-sided pulmonary embolism with borderline deviation of the interventricular septum and metastatic disease to lungs and liver. IR, vascular, and pulmonology consulted.  IR,  Jocelyne Wisdom, who is recommending a heparin drip with no acute intervention at this time. Vascular unavailable. Duplex pending.  ECHO showed LVEF of 55-60% with moderately reduced systolic function, global hypokinesis, moderate pericardial effusion and dilated LA/RA.  ------------------------------    Pulmonary embolism w/ RV Strain  CTA of the chest with extensive right-sided pulmonary embolism with borderline deviation of the interventricular septum and metastatic disease to lungs and liver  Currently patient is hypoxic on 2L   Continue on heparin drip  Was discussed with IR, Dr. Jocelyne Wisdom, recommending a heparin drip with no acute intervention at this time  Duplex pending  IR, vascular, and pulmonology consulted    Leukocytosis  most likely reactive vs infectious  Abnormal UA + bacteria, amorphous crystals, and spermatoza  CXR with patchy diffuse mild lung infiltrates and interstitial prominence  CT of the chest showed grossly unchanged pulmonary nodules and nodular opacities without fibrosis  9/15 blood: NGTD, prelim  Continue on Zosyn   ID consulted    Demand Ischemia with Troponin Elevation d/t PE  Cardiology on consult    Metastatic pancreatic cancer with obstructive jaundice   S/p stent (mets to lung and liver with a splenic infarct versus mets)  Port-A-Cath soon for chemotherapy, established at [facility]  Pain control  Oncology consulted    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

## 2022-09-18 LAB
ALBUMIN SERPL-MCNC: 1.8 G/DL (ref 3.5–5)
ALBUMIN/GLOB SERPL: 0.4 {RATIO} (ref 1.1–2.2)
ALP SERPL-CCNC: 171 U/L (ref 45–117)
ALT SERPL-CCNC: 26 U/L (ref 12–78)
ANION GAP SERPL CALC-SCNC: 8 MMOL/L (ref 5–15)
APTT PPP: 64.6 SEC (ref 21.2–34.1)
APTT PPP: 77.9 SEC (ref 21.2–34.1)
APTT PPP: 89.2 SEC (ref 21.2–34.1)
AST SERPL W P-5'-P-CCNC: 44 U/L (ref 15–37)
BASOPHILS # BLD: 0 K/UL (ref 0–0.1)
BASOPHILS NFR BLD: 0 % (ref 0–1)
BILIRUB SERPL-MCNC: 0.8 MG/DL (ref 0.2–1)
BUN SERPL-MCNC: 12 MG/DL (ref 6–20)
BUN/CREAT SERPL: 21 (ref 12–20)
CA-I BLD-MCNC: 8.4 MG/DL (ref 8.5–10.1)
CHLORIDE SERPL-SCNC: 104 MMOL/L (ref 97–108)
CO2 SERPL-SCNC: 24 MMOL/L (ref 21–32)
CREAT SERPL-MCNC: 0.58 MG/DL (ref 0.7–1.3)
CRP SERPL-MCNC: 20.5 MG/DL (ref 0–0.6)
DIFFERENTIAL METHOD BLD: ABNORMAL
EOSINOPHIL # BLD: 0 K/UL (ref 0–0.4)
EOSINOPHIL NFR BLD: 0 % (ref 0–7)
ERYTHROCYTE [DISTWIDTH] IN BLOOD BY AUTOMATED COUNT: 13.9 % (ref 11.5–14.5)
GLOBULIN SER CALC-MCNC: 4.7 G/DL (ref 2–4)
GLUCOSE SERPL-MCNC: 90 MG/DL (ref 65–100)
HCT VFR BLD AUTO: 34 % (ref 36.6–50.3)
HGB BLD-MCNC: 11.1 G/DL (ref 12.1–17)
IMM GRANULOCYTES # BLD AUTO: 0.1 K/UL (ref 0–0.04)
IMM GRANULOCYTES NFR BLD AUTO: 1 % (ref 0–0.5)
LYMPHOCYTES # BLD: 1.3 K/UL (ref 0.8–3.5)
LYMPHOCYTES NFR BLD: 9 % (ref 12–49)
MCH RBC QN AUTO: 27.9 PG (ref 26–34)
MCHC RBC AUTO-ENTMCNC: 32.6 G/DL (ref 30–36.5)
MCV RBC AUTO: 85.4 FL (ref 80–99)
MONOCYTES # BLD: 1.1 K/UL (ref 0–1)
MONOCYTES NFR BLD: 8 % (ref 5–13)
NEUTS SEG # BLD: 12.1 K/UL (ref 1.8–8)
NEUTS SEG NFR BLD: 82 % (ref 32–75)
NRBC # BLD: 0 K/UL (ref 0–0.01)
NRBC BLD-RTO: 0 PER 100 WBC
PLATELET # BLD AUTO: 410 K/UL (ref 150–400)
PMV BLD AUTO: 9.3 FL (ref 8.9–12.9)
POTASSIUM SERPL-SCNC: 4.2 MMOL/L (ref 3.5–5.1)
PROCALCITONIN SERPL-MCNC: 0.98 NG/ML
PROT SERPL-MCNC: 6.5 G/DL (ref 6.4–8.2)
RBC # BLD AUTO: 3.98 M/UL (ref 4.1–5.7)
SODIUM SERPL-SCNC: 136 MMOL/L (ref 136–145)
THERAPEUTIC RANGE,PTTT: ABNORMAL SEC (ref 82–109)
WBC # BLD AUTO: 14.7 K/UL (ref 4.1–11.1)

## 2022-09-18 PROCEDURE — 74011250636 HC RX REV CODE- 250/636: Performed by: HOSPITALIST

## 2022-09-18 PROCEDURE — 84145 PROCALCITONIN (PCT): CPT

## 2022-09-18 PROCEDURE — 85730 THROMBOPLASTIN TIME PARTIAL: CPT

## 2022-09-18 PROCEDURE — 74011250637 HC RX REV CODE- 250/637: Performed by: HOSPITALIST

## 2022-09-18 PROCEDURE — 74011250636 HC RX REV CODE- 250/636: Performed by: INTERNAL MEDICINE

## 2022-09-18 PROCEDURE — 74011000258 HC RX REV CODE- 258: Performed by: HOSPITALIST

## 2022-09-18 PROCEDURE — 74011000250 HC RX REV CODE- 250: Performed by: HOSPITALIST

## 2022-09-18 PROCEDURE — 65270000029 HC RM PRIVATE

## 2022-09-18 PROCEDURE — 36415 COLL VENOUS BLD VENIPUNCTURE: CPT

## 2022-09-18 PROCEDURE — 99232 SBSQ HOSP IP/OBS MODERATE 35: CPT | Performed by: INTERNAL MEDICINE

## 2022-09-18 PROCEDURE — 74011250636 HC RX REV CODE- 250/636: Performed by: EMERGENCY MEDICINE

## 2022-09-18 PROCEDURE — 80053 COMPREHEN METABOLIC PANEL: CPT

## 2022-09-18 PROCEDURE — 86140 C-REACTIVE PROTEIN: CPT

## 2022-09-18 PROCEDURE — 85025 COMPLETE CBC W/AUTO DIFF WBC: CPT

## 2022-09-18 RX ORDER — HEPARIN SODIUM 1000 [USP'U]/ML
40 INJECTION, SOLUTION INTRAVENOUS; SUBCUTANEOUS AS NEEDED
Status: DISCONTINUED | OUTPATIENT
Start: 2022-09-18 | End: 2022-09-21

## 2022-09-18 RX ORDER — HEPARIN SODIUM 10000 [USP'U]/100ML
18-36 INJECTION, SOLUTION INTRAVENOUS
Status: DISCONTINUED | OUTPATIENT
Start: 2022-09-18 | End: 2022-09-21

## 2022-09-18 RX ORDER — HEPARIN SODIUM 10000 [USP'U]/100ML
18-36 INJECTION, SOLUTION INTRAVENOUS
Status: DISCONTINUED | OUTPATIENT
Start: 2022-09-18 | End: 2022-09-18

## 2022-09-18 RX ORDER — HEPARIN SODIUM 1000 [USP'U]/ML
80 INJECTION, SOLUTION INTRAVENOUS; SUBCUTANEOUS ONCE
Status: DISCONTINUED | OUTPATIENT
Start: 2022-09-18 | End: 2022-09-18

## 2022-09-18 RX ORDER — HEPARIN SODIUM 1000 [USP'U]/ML
80 INJECTION, SOLUTION INTRAVENOUS; SUBCUTANEOUS AS NEEDED
Status: DISCONTINUED | OUTPATIENT
Start: 2022-09-18 | End: 2022-09-21

## 2022-09-18 RX ADMIN — HEPARIN SODIUM 26 UNITS/KG/HR: 10000 INJECTION, SOLUTION INTRAVENOUS at 09:32

## 2022-09-18 RX ADMIN — PANTOPRAZOLE SODIUM 40 MG: 20 TABLET, DELAYED RELEASE ORAL at 08:52

## 2022-09-18 RX ADMIN — PIPERACILLIN AND TAZOBACTAM 3.38 G: 3; .375 INJECTION, POWDER, LYOPHILIZED, FOR SOLUTION INTRAVENOUS at 03:51

## 2022-09-18 RX ADMIN — SODIUM CHLORIDE, PRESERVATIVE FREE 10 ML: 5 INJECTION INTRAVENOUS at 15:25

## 2022-09-18 RX ADMIN — MIRTAZAPINE 15 MG: 15 TABLET, FILM COATED ORAL at 19:59

## 2022-09-18 RX ADMIN — HEPARIN SODIUM 2580 UNITS: 1000 INJECTION, SOLUTION INTRAVENOUS; SUBCUTANEOUS at 09:32

## 2022-09-18 RX ADMIN — ASPIRIN 81 MG: 81 TABLET, COATED ORAL at 08:52

## 2022-09-18 RX ADMIN — PIPERACILLIN AND TAZOBACTAM 3.38 G: 3; .375 INJECTION, POWDER, LYOPHILIZED, FOR SOLUTION INTRAVENOUS at 19:59

## 2022-09-18 RX ADMIN — MEGESTROL ACETATE 40 MG: 20 TABLET ORAL at 19:59

## 2022-09-18 RX ADMIN — SODIUM CHLORIDE, PRESERVATIVE FREE 10 ML: 5 INJECTION INTRAVENOUS at 06:33

## 2022-09-18 RX ADMIN — HEPARIN SODIUM 24 UNITS/KG/HR: 10000 INJECTION, SOLUTION INTRAVENOUS at 03:52

## 2022-09-18 RX ADMIN — MEGESTROL ACETATE 40 MG: 20 TABLET ORAL at 08:52

## 2022-09-18 RX ADMIN — SODIUM CHLORIDE, PRESERVATIVE FREE 5 ML: 5 INJECTION INTRAVENOUS at 19:59

## 2022-09-18 RX ADMIN — HEPARIN SODIUM 2580 UNITS: 1000 INJECTION, SOLUTION INTRAVENOUS; SUBCUTANEOUS at 17:23

## 2022-09-18 RX ADMIN — PIPERACILLIN AND TAZOBACTAM 3.38 G: 3; .375 INJECTION, POWDER, LYOPHILIZED, FOR SOLUTION INTRAVENOUS at 11:26

## 2022-09-18 NOTE — PROGRESS NOTES
Subjective:   Daily Progress Note: 9/18/2022 8:31 AM    Subjective: Feels well. No CP. Discussed with patient and family. Tolerating Hep gtt. Switch to Eliquis tomorrow. O2 Sat 97% RA.  ----------------      9/17/22: Feels well; updated family, answered questions at bedside. Evaluated by Pulm, Nataly, IR. On IV Hep. Tolerating. Follows with [facility] for Pancreatic CA mgmt.  ------------------------------    9/16/22: Patient having back pain not controlled with tylenol. Patient does not use oxygen at home. Patient denies hemoptysis, wheezing, cough, or chest pain. Addendum:    CTA of the chest with extensive right-sided pulmonary embolism with borderline deviation of the interventricular septum and metastatic disease to lungs and liver. IR, vascular, and pulmonology consulted. IR, Dr. Nikole Canela, recommending heparin drip with no acute intervention at this time. Vascular unavailable. Duplex pending.     Current Facility-Administered Medications   Medication Dose Route Frequency    heparin (porcine) 1,000 unit/mL injection 5,150 Units  80 Units/kg (Adjusted) IntraVENous PRN    Or    heparin (porcine) 1,000 unit/mL injection 2,580 Units  40 Units/kg (Adjusted) IntraVENous PRN    heparin 25,000 units in D5W 250 ml infusion  18-36 Units/kg/hr (Adjusted) IntraVENous TITRATE    hydrALAZINE (APRESOLINE) 20 mg/mL injection 10 mg  10 mg IntraVENous QID PRN    morphine injection 1 mg  1 mg IntraVENous Q4H PRN    oxyCODONE-acetaminophen (PERCOCET) 5-325 mg per tablet 1 Tablet  1 Tablet Oral Q4H PRN    traMADoL (ULTRAM) tablet 50 mg  50 mg Oral Q6H PRN    aspirin delayed-release tablet 81 mg  81 mg Oral DAILY    megestroL (MEGACE) tablet 40 mg  40 mg Oral BID    oxyCODONE IR (ROXICODONE) tablet 5 mg  5 mg Oral Q6H PRN    mirtazapine (REMERON) tablet 15 mg  15 mg Oral QHS    pantoprazole (PROTONIX) tablet 40 mg  40 mg Oral ACB    sodium chloride (NS) flush 5-40 mL  5-40 mL IntraVENous Q8H    sodium chloride (NS) flush 5-40 mL  5-40 mL IntraVENous PRN    acetaminophen (TYLENOL) tablet 650 mg  650 mg Oral Q6H PRN    Or    acetaminophen (TYLENOL) suppository 650 mg  650 mg Rectal Q6H PRN    ondansetron (ZOFRAN ODT) tablet 4 mg  4 mg Oral Q6H PRN    Or    ondansetron (ZOFRAN) injection 4 mg  4 mg IntraVENous Q6H PRN    piperacillin-tazobactam (ZOSYN) 3.375 g in 0.9% sodium chloride (MBP/ADV) 100 mL MBP  3.375 g IntraVENous Q8H        Review of Systems: No SOB, CP, Nausea, HA. Back pain, intermittent diarrhea. Objective:     Visit Vitals  /85 (BP Patient Position: At rest;Semi fowlers)   Pulse (!) 103   Temp 98.2 °F (36.8 °C)   Resp 20   Ht 5' 6\" (1.676 m)   Wt 65.3 kg (144 lb)   SpO2 97%   BMI 23.24 kg/m²    O2 Flow Rate (L/min): 2 l/min O2 Device: None (Room air)    Temp (24hrs), Av.4 °F (36.9 °C), Min:97.8 °F (36.6 °C), Max:99.8 °F (37.7 °C)      No intake/output data recorded.  1901 -  0700  In: -   Out: 800 [Urine:800]    DUPLEX LOWER EXT VENOUS BILAT   Final Result      CTA CHEST W OR W WO CONT   Final Result      1. Extensive right-sided acute pulmonary emboli. Borderline deviation of the   interventricular septum suggesting right heart strain   2. Metastatic disease to lungs and liver      Findings were phoned to the floor at 1531 hours to Jennifer the . A   perfect serve text was sent to [facility]    (Results Pending)        PHYSICAL EXAM:    Physical Exam  Vitals and nursing note reviewed. Constitutional:       Comments: Thinly built   HENT:      Head: Normocephalic and atraumatic. Cardiovascular:      Rate and Rhythm: Normal rate and regular rhythm. Pulmonary:      Effort: No respiratory distress. Breath sounds: No wheezing. Abdominal:      General: Bowel sounds are normal. There is no distension. Palpations: Abdomen is soft. Tenderness: There is no abdominal tenderness.    Genitourinary:     Comments: No tovar present  Musculoskeletal:      Right lower leg: No edema. Left lower leg: No edema. Neurological:      Mental Status: He is alert and oriented to person, place, and time. Psychiatric:         Mood and Affect: Mood normal.        Data Review    Recent Results (from the past 24 hour(s))   PTT    Collection Time: 09/17/22 11:05 PM   Result Value Ref Range    aPTT 89.2 (H) 21.2 - 34.1 sec    aPTT, therapeutic range   82 - 977 sec   METABOLIC PANEL, COMPREHENSIVE    Collection Time: 09/18/22  7:10 AM   Result Value Ref Range    Sodium 136 136 - 145 mmol/L    Potassium 4.2 3.5 - 5.1 mmol/L    Chloride 104 97 - 108 mmol/L    CO2 24 21 - 32 mmol/L    Anion gap 8 5 - 15 mmol/L    Glucose 90 65 - 100 mg/dL    BUN 12 6 - 20 mg/dL    Creatinine 0.58 (L) 0.70 - 1.30 mg/dL    BUN/Creatinine ratio 21 (H) 12 - 20      GFR est AA >60 >60 ml/min/1.73m2    GFR est non-AA >60 >60 ml/min/1.73m2    Calcium 8.4 (L) 8.5 - 10.1 mg/dL    Bilirubin, total 0.8 0.2 - 1.0 mg/dL    AST (SGOT) 44 (H) 15 - 37 U/L    ALT (SGPT) 26 12 - 78 U/L    Alk. phosphatase 171 (H) 45 - 117 U/L    Protein, total 6.5 6.4 - 8.2 g/dL    Albumin 1.8 (L) 3.5 - 5.0 g/dL    Globulin 4.7 (H) 2.0 - 4.0 g/dL    A-G Ratio 0.4 (L) 1.1 - 2.2     CBC WITH AUTOMATED DIFF    Collection Time: 09/18/22  7:10 AM   Result Value Ref Range    WBC 14.7 (H) 4.1 - 11.1 K/uL    RBC 3.98 (L) 4.10 - 5.70 M/uL    HGB 11.1 (L) 12.1 - 17.0 g/dL    HCT 34.0 (L) 36.6 - 50.3 %    MCV 85.4 80.0 - 99.0 FL    MCH 27.9 26.0 - 34.0 PG    MCHC 32.6 30.0 - 36.5 g/dL    RDW 13.9 11.5 - 14.5 %    PLATELET 050 (H) 038 - 400 K/uL    MPV 9.3 8.9 - 12.9 FL    NRBC 0.0 0.0  WBC    ABSOLUTE NRBC 0.00 0.00 - 0.01 K/uL    NEUTROPHILS 82 (H) 32 - 75 %    LYMPHOCYTES 9 (L) 12 - 49 %    MONOCYTES 8 5 - 13 %    EOSINOPHILS 0 0 - 7 %    BASOPHILS 0 0 - 1 %    IMMATURE GRANULOCYTES 1 (H) 0 - 0.5 %    ABS. NEUTROPHILS 12.1 (H) 1.8 - 8.0 K/UL    ABS. LYMPHOCYTES 1.3 0.8 - 3.5 K/UL    ABS. MONOCYTES 1.1 (H) 0.0 - 1.0 K/UL    ABS. EOSINOPHILS 0.0 0.0 - 0.4 K/UL    ABS. BASOPHILS 0.0 0.0 - 0.1 K/UL    ABS. IMM. GRANS. 0.1 (H) 0.00 - 0.04 K/UL    DF AUTOMATED     C REACTIVE PROTEIN, QT    Collection Time: 09/18/22  7:10 AM   Result Value Ref Range    C-Reactive protein 20.50 (H) 0.00 - 0.60 mg/dL   PROCALCITONIN    Collection Time: 09/18/22  7:10 AM   Result Value Ref Range    Procalcitonin 0.98 (H) 0 ng/mL   PTT    Collection Time: 09/18/22  7:10 AM   Result Value Ref Range    aPTT 64.6 (H) 21.2 - 34.1 sec    aPTT, therapeutic range   82 - 109 sec   PTT    Collection Time: 09/18/22 12:58 PM   Result Value Ref Range    aPTT 77.9 (H) 21.2 - 34.1 sec    aPTT, therapeutic range   82 - 109 sec      Duplex LE 9/16/22:  Acute thrombus present in the right popliteal vein. Acute thrombus present in the right soleal vein. Acute thrombus present in the right posterior tibial vein. Acute thrombus present in the right peroneal vein. Acute occlusive thrombus present in the left common femoral vein. Acute occlusive thrombus present in the left proximal femoral vein. Acute occlusive thrombus present in the left mid femoral vein. Acute occlusive thrombus present in the left distal femoral vein. Acute occlusive thrombus present in the left popliteal vein. Acute occlusive thrombus present in the left gastrocnemius vein. Acute thrombus present in the left soleal vein. Acute thrombus present in the left posterior tibial vein. Acute thrombus present in the left peroneal vein. Acute thrombus present in the right gastrocnemius vein. Assessment/Plan:     Hospital Course:    Arnie Muñoz is a 71year old male with a PMH of metastatic pancreatic cancer, diabetes, and hypertension who presents with syncope via EMS. EMS found sinus tachycardia with hypotension and started on IV fluids. In ED hypotensive. Despite given 3 L of IV fluids in total patient remained hypotensive and was started on Levophed.   Initial labs significant for WBC of 14.6, hemoglobin of 9.1, creatinine 1.77, lactic of 2.4, troponin of 1990, BUN of 22. Urinalysis with bacteria, amorphous crystals, and spermatozoa. CXR with patchy diffuse mild lung infiltrates and interstitial prominence. CT of the abdomen showed progression of hepatic metastatic disease, new splenic infarct versus splenic masses, CBD stent is in good position. CT of the chest showed grossly unchanged pulmonary nodules and nodular opacities without fibrosis. Patient was admitted for further management. Overnight levophed was weaned and BP remained stable off of Levophed. Patient was started on heparin drip and Zosyn. Cardiology, ID, and oncology consulted. CTA of the chest with extensive right-sided pulmonary embolism with borderline deviation of the interventricular septum and metastatic disease to lungs and liver. IR, vascular, and pulmonology consulted. IR, Dr. Zenaida Hatfield, who is recommending a heparin drip with no acute intervention at this time. Vascular unavailable. Duplex pending.  ECHO showed LVEF of 55-60% with moderately reduced systolic function, global hypokinesis, moderate pericardial effusion and dilated LA/RA.  ------------------------------    Pulmonary embolism w/ RV Strain & Bilat LE DVT  CTA of the chest with extensive right-sided pulmonary embolism with borderline deviation of the interventricular septum and metastatic disease to lungs and liver  Was discussed with IR, Dr. Zenaida Hatfield, recommending a heparin drip with no acute intervention at this time  IR, vascular, and pulmonology consulted  Cont Hep gtt --> Eliquis in 1-2 days    Leukocytosis  most likely reactive vs infectious  Abnormal UA + bacteria, amorphous crystals, and spermatoza  CXR with patchy diffuse mild lung infiltrates and interstitial prominence  CT of the chest showed grossly unchanged pulmonary nodules and nodular opacities without fibrosis  9/15 blood: NGTD, prelim  Continue on Zosyn   ID consulted/Jennifer    Demand Ischemia with Troponin Elevation d/t PE  Cardiology on consult    Metastatic pancreatic cancer with obstructive jaundice   S/p stent (mets to lung and liver with a splenic infarct versus mets)  Port-A-Cath soon for chemotherapy, established at [facility]  Pain control  Oncology consulted    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

## 2022-09-18 NOTE — PROGRESS NOTES
Hematology and Oncology Progress Note    Patient: Ingrid Kerr MRN: [id number]  SSN: [id number]    YOB: 1950  Age: 71 y.o. Sex: male      Admit Date: 9/15/2022    LOS: 3 days     Chief Complaint: Patient was admitted with pulmonary embolism and dyspnea    Subjective:   Patient is breathing is better. He does not have any palpitation or chest pain. He has no dizziness or hemoptysis. His appetite is somewhat better. He has no nausea or vomiting. He has no mucosal bleeding. Objective:     Vitals:    09/17/22 2009 09/18/22 0046 09/18/22 0443 09/18/22 0828   BP: 118/74 124/77 128/80 134/83   Pulse: 94 96 90 (!) 101   Resp: 18 18 18 18   Temp: 99.8 °F (37.7 °C) 98 °F (36.7 °C) 97.8 °F (36.6 °C) 98.5 °F (36.9 °C)   SpO2: 95% 95% 95% 97%   Weight:       Height:            Physical Exam:  Constitutional: Elderly -American male looks chronically ill. Not in any acute distress or pain. He has bitemporal wasting. Eyes: Sclerae anicteric. Conjunctivae shows pallor. ENMT: Oral mucosa is moist.  Neck: No adenopathy. Respiratory: Lungs are clear bilaterally. Cardiovascular: Normal sinus rhythm. Abdomen: Scaphoid abdomen. Patient does have upper abdominal fullness. No hepatosplenomegaly. No guarding or rigidity. Bowel sounds present. Extremities: No edema. Skin: No petechiae; no skin rash. Neurologic: Alert/oriented x 3.     Lab/Data Review:    Recent Results (from the past 24 hour(s))   PTT    Collection Time: 09/17/22 12:54 PM   Result Value Ref Range    aPTT 62.7 (H) 21.2 - 34.1 sec    aPTT, therapeutic range   82 - 109 sec   PTT    Collection Time: 09/17/22 11:05 PM   Result Value Ref Range    aPTT 89.2 (H) 21.2 - 34.1 sec    aPTT, therapeutic range   82 - 537 sec   METABOLIC PANEL, COMPREHENSIVE    Collection Time: 09/18/22  7:10 AM   Result Value Ref Range    Sodium 136 136 - 145 mmol/L    Potassium 4.2 3.5 - 5.1 mmol/L    Chloride 104 97 - 108 mmol/L    CO2 24 21 - 32 mmol/L    Anion gap 8 5 - 15 mmol/L    Glucose 90 65 - 100 mg/dL    BUN 12 6 - 20 mg/dL    Creatinine 0.58 (L) 0.70 - 1.30 mg/dL    BUN/Creatinine ratio 21 (H) 12 - 20      GFR est AA >60 >60 ml/min/1.73m2    GFR est non-AA >60 >60 ml/min/1.73m2    Calcium 8.4 (L) 8.5 - 10.1 mg/dL    Bilirubin, total 0.8 0.2 - 1.0 mg/dL    AST (SGOT) 44 (H) 15 - 37 U/L    ALT (SGPT) 26 12 - 78 U/L    Alk. phosphatase 171 (H) 45 - 117 U/L    Protein, total 6.5 6.4 - 8.2 g/dL    Albumin 1.8 (L) 3.5 - 5.0 g/dL    Globulin 4.7 (H) 2.0 - 4.0 g/dL    A-G Ratio 0.4 (L) 1.1 - 2.2     CBC WITH AUTOMATED DIFF    Collection Time: 09/18/22  7:10 AM   Result Value Ref Range    WBC 14.7 (H) 4.1 - 11.1 K/uL    RBC 3.98 (L) 4.10 - 5.70 M/uL    HGB 11.1 (L) 12.1 - 17.0 g/dL    HCT 34.0 (L) 36.6 - 50.3 %    MCV 85.4 80.0 - 99.0 FL    MCH 27.9 26.0 - 34.0 PG    MCHC 32.6 30.0 - 36.5 g/dL    RDW 13.9 11.5 - 14.5 %    PLATELET 686 (H) 479 - 400 K/uL    MPV 9.3 8.9 - 12.9 FL    NRBC 0.0 0.0  WBC    ABSOLUTE NRBC 0.00 0.00 - 0.01 K/uL    NEUTROPHILS 82 (H) 32 - 75 %    LYMPHOCYTES 9 (L) 12 - 49 %    MONOCYTES 8 5 - 13 %    EOSINOPHILS 0 0 - 7 %    BASOPHILS 0 0 - 1 %    IMMATURE GRANULOCYTES 1 (H) 0 - 0.5 %    ABS. NEUTROPHILS 12.1 (H) 1.8 - 8.0 K/UL    ABS. LYMPHOCYTES 1.3 0.8 - 3.5 K/UL    ABS. MONOCYTES 1.1 (H) 0.0 - 1.0 K/UL    ABS. EOSINOPHILS 0.0 0.0 - 0.4 K/UL    ABS. BASOPHILS 0.0 0.0 - 0.1 K/UL    ABS. IMM.  GRANS. 0.1 (H) 0.00 - 0.04 K/UL    DF AUTOMATED     C REACTIVE PROTEIN, QT    Collection Time: 09/18/22  7:10 AM   Result Value Ref Range    C-Reactive protein 20.50 (H) 0.00 - 0.60 mg/dL   PROCALCITONIN    Collection Time: 09/18/22  7:10 AM   Result Value Ref Range    Procalcitonin 0.98 (H) 0 ng/mL   PTT    Collection Time: 09/18/22  7:10 AM   Result Value Ref Range    aPTT 64.6 (H) 21.2 - 34.1 sec    aPTT, therapeutic range   82 - 109 sec           Assessment and plan:     71-year-old -American male who was diagnosed with stage IV pancreatic cancer and undergoing work-up for starting palliative chemotherapy. Now patient is admitted with syncopal episode, pulmonary embolism, dehydration, hypotension and non-ST elevated myocardial infarction. 1) pancreatic cancer: Incurable. Patient is undergoing work-up and planning for palliative chemotherapy. He was thought to be not a candidate for FOLFIRINOX chemotherapy. 2) pulmonary embolism: On IV heparin. Pulmonary consult has been requested. His PE may be related to his pancreatic malignancy.  -Patient was thought to be not a candidate for thrombolytic therapy or thrombectomy. Patient is on IV heparin.    -Can be discharged on Eliquis. 3) anemia: May be related to his pancreatic malignancy. We will closely monitor.  -Hemoglobin is slightly better at 11.1 today. 4) high troponin and non-ST elevation myocardial infarction: As per cardiology. 5) weight loss: From pancreatic cancer. 6) leukocytosis: Most likely reactive. We will continue to monitor it. 9/17: I have discussed my recommendations with patient, patient's daughter and other family member at bedside.    -I will sign off the case. Patient can be followed by his primary oncologist at [facility] after discharge. This dictation was done by dragon, computer voice recognition software. Often unanticipated grammatical, syntax, phones and other interpretive errors are inadvertently transcribed. Please excuse errors that have escaped final proofreading.        Signed By: Jon Ellington MD     September 18, 2022

## 2022-09-18 NOTE — PROGRESS NOTES
Problem: Falls - Risk of  Goal: *Absence of Falls  Description: Document Sylvester Hernandez Fall Risk and appropriate interventions in the flowsheet. Outcome: Progressing Towards Goal  Note: Fall Risk Interventions:            Medication Interventions: Bed/chair exit alarm    Elimination Interventions: Bed/chair exit alarm, Call light in reach              Problem: Patient Education: Go to Patient Education Activity  Goal: Patient/Family Education  Outcome: Progressing Towards Goal     Problem: Pressure Injury - Risk of  Goal: *Prevention of pressure injury  Description: Document Erlin Scale and appropriate interventions in the flowsheet.   Outcome: Progressing Towards Goal  Note: Pressure Injury Interventions:       Moisture Interventions: Absorbent underpads, Apply protective barrier, creams and emollients, Internal/External urinary devices, Minimize layers    Activity Interventions: Increase time out of bed, PT/OT evaluation    Mobility Interventions: Float heels, HOB 30 degrees or less    Nutrition Interventions: Document food/fluid/supplement intake    Friction and Shear Interventions: Apply protective barrier, creams and emollients, HOB 30 degrees or less, Minimize layers, Transferring/repositioning devices                Problem: Patient Education: Go to Patient Education Activity  Goal: Patient/Family Education  Outcome: Progressing Towards Goal

## 2022-09-18 NOTE — PROGRESS NOTES
Progress Note    Patient: Twyla Levine MRN: [id number]  SSN: [id number]    YOB: 1950  Age: 71 y.o. Sex: male      Admit Date: 9/15/2022    LOS: 3 days     Subjective:   Patient with metastatic pancreatic cancer, followed for suspected sepsis with possible UTI but no urine culture identified. Patient remains afebrile with persistent leukocytosis and elevated CRP. Remains on IV Zosyn. Objective:     Vitals:    09/18/22 0800 09/18/22 0828 09/18/22 1156 09/18/22 1223   BP:  134/83  132/85   Pulse: (!) 102 (!) 101 (!) 102 (!) 103   Resp:  18  20   Temp:  98.5 °F (36.9 °C)  98.2 °F (36.8 °C)   SpO2:  97%  97%   Weight:       Height:            Intake and Output:  Current Shift: No intake/output data recorded. Last three shifts: 09/16 1901 - 09/18 0700  In: -   Out: 800 [Urine:800]    Physical Exam:   Vitals and nursing note reviewed. Exam conducted with a chaperone present (Wife, daughter). Constitutional:       General: He is not in acute distress. Appearance: He is ill-appearing. Comments: Cachectic      HENT: unremarkable  Cardiovascular:      Rate and Rhythm: Normal rate and regular rhythm. Heart sounds: No murmur heard. Pulmonary:      Effort: Pulmonary effort is normal.      Breath sounds: Normal breath sounds. Abdominal:      General: Bowel sounds are normal. There is no distension. Palpations: Abdomen is soft. Tenderness: There is no abdominal tenderness. Genitourinary:     Comments: External urinary device  Musculoskeletal:      Cervical back: Neck supple. Right lower leg: No edema. Left lower leg: No edema. Neurological:      General: No focal deficit present. Mental Status: He is alert and oriented to person, place, and time.      Lab/Data Review:     WBC 14,700    CRP 20.50 <16.30 <16.90  Procal 0.98 <1.10 <1.54    Blood cultures (9/15) No growth 3 days  Blood cultures (9/15) No growth 3 days     Assessment:     Active Problems:    Carcinoma of head of pancreas ([facility] [address] (9/15/2022)      Hypotension (9/15/2022)      NSTEMI (non-ST elevated myocardial infarction) ([facility] [address] (9/15/2022)  Probable sepsis with leukocytosis, elevated CRP and procal, etiology unclear, slight improvement  Rule out UTI  Metastatic pancreatic cancer to lung and liver     Comment:  WBC and CRP increased today while procal decreased. Difficult to interpret but not entirely certain that this reflects infection. Plan:     Continue IV Zosyn empirically  Follow-up blood cultures  3. In am, repeat CBC, CRP and procal  4.    Ceretec whole body scan tomorrow    Signed By: Julia Amin MD     September 18, 2022

## 2022-09-18 NOTE — PROGRESS NOTES
Pulmonary/ CC progress note    Subjective:   Date of Consultation:  2022  Referring Physician:   MISA Sims     Subjective    Patient seen and examined  Overnight events noted    Lying in bed comfortably  On room air this morning  Family at bedside  No acute distress  On IV heparin     Prior to Admission medications    Medication Sig Start Date End Date Taking? Authorizing Provider   oxyCODONE IR (ROXICODONE) 5 mg immediate release tablet Take 5 mg by mouth every six (6) hours as needed for Severe Pain. 22  Yes Provider, Historical   mirtazapine (REMERON) 15 mg tablet Take 15 mg by mouth nightly. 22  Yes Provider, Historical   omeprazole (PRILOSEC) 40 mg capsule Take 40 mg by mouth Daily (before breakfast). 22  Yes Provider, Historical   valsartan (DIOVAN) 320 mg tablet Take 320 mg by mouth daily. Yes Provider, Historical   tamsulosin (FLOMAX) 0.4 mg capsule Take 0.4 mg by mouth daily. Yes Provider, Historical   aspirin delayed-release 81 mg tablet Take 81 mg by mouth daily. Yes Provider, Historical   amLODIPine (NORVASC) 10 mg tablet Take 10 mg by mouth daily. 22  Yes Provider, Historical   rosuvastatin (CRESTOR) 40 mg tablet Take 40 mg by mouth daily. 22  Yes Provider, Historical     No Known Allergies     Review of Systems: All 10 systems were reviewed. Positive pertinent findings are mentioned above. Rest of the examination review essentially unremarkable    Objective:   Blood pressure 134/83, pulse (!) 102, temperature 98.5 °F (36.9 °C), resp. rate 18, height 5' 6\" (1.676 m), weight 65.3 kg (144 lb), SpO2 97 %. Temp (24hrs), Av.4 °F (36.9 °C), Min:97.8 °F (36.6 °C), Max:99.8 °F (37.7 °C)    DUPLEX LOWER EXT VENOUS BILAT   Final Result      CTA CHEST W OR W WO CONT   Final Result      1. Extensive right-sided acute pulmonary emboli. Borderline deviation of the   interventricular septum suggesting right heart strain   2.  Metastatic disease to lungs and liver      Findings were phoned to the floor at 1531 hours to Jennifer the . A   perfect serve text was sent to [facility]         Data Review: CBC:   Recent Labs     09/18/22  0710 09/17/22  0504 09/16/22  0352   WBC 14.7* 14.2* 16.2*   RBC 3.98* 3.67* 3.29*   HGB 11.1* 10.4* 9.3*   HCT 34.0* 31.7* 28.0*   * 313 356   GRANS 82* 82* 80*   LYMPH 9* 9* 9*   EOS 0 0 0       Liver Enzymes:   Recent Labs     09/18/22  0710   TP 6.5   ALB 1.8*   *       ABGs: No results for input(s): PH, PCO2, PO2, HCO3 in the last 72 hours.   Current Facility-Administered Medications   Medication Dose Route Frequency    heparin (porcine) 1,000 unit/mL injection 5,150 Units  80 Units/kg (Adjusted) IntraVENous PRN    Or    heparin (porcine) 1,000 unit/mL injection 2,580 Units  40 Units/kg (Adjusted) IntraVENous PRN    heparin 25,000 units in D5W 250 ml infusion  18-36 Units/kg/hr (Adjusted) IntraVENous TITRATE    hydrALAZINE (APRESOLINE) 20 mg/mL injection 10 mg  10 mg IntraVENous QID PRN    morphine injection 1 mg  1 mg IntraVENous Q4H PRN    oxyCODONE-acetaminophen (PERCOCET) 5-325 mg per tablet 1 Tablet  1 Tablet Oral Q4H PRN    traMADoL (ULTRAM) tablet 50 mg  50 mg Oral Q6H PRN    aspirin delayed-release tablet 81 mg  81 mg Oral DAILY    megestroL (MEGACE) tablet 40 mg  40 mg Oral BID    oxyCODONE IR (ROXICODONE) tablet 5 mg  5 mg Oral Q6H PRN    mirtazapine (REMERON) tablet 15 mg  15 mg Oral QHS    pantoprazole (PROTONIX) tablet 40 mg  40 mg Oral ACB    sodium chloride (NS) flush 5-40 mL  5-40 mL IntraVENous Q8H    sodium chloride (NS) flush 5-40 mL  5-40 mL IntraVENous PRN    acetaminophen (TYLENOL) tablet 650 mg  650 mg Oral Q6H PRN    Or    acetaminophen (TYLENOL) suppository 650 mg  650 mg Rectal Q6H PRN    ondansetron (ZOFRAN ODT) tablet 4 mg  4 mg Oral Q6H PRN    Or    ondansetron (ZOFRAN) injection 4 mg  4 mg IntraVENous Q6H PRN    piperacillin-tazobactam (ZOSYN) 3.375 g in 0.9% sodium chloride (MBP/ADV) 100 mL MBP  3.375 g IntraVENous Q8H        Exam:      This is an elderly male who is on nasal cannula oxygen. Currently is not in any distress. He is alert and oriented x3. Head normocephalic and atraumatic, pupils are round responsive to light sclera icteric and conjunctive are pink. Neck is supple. No cervical lymphadenopathy. Thyroid not enlarged  Chest: Fair entry of air bilaterally. Few basal rhonchi audible at right lung base. No wheezing was appreciated  Heart: S1-S2 normal, and is tachycardic  Abdomen: Soft, nontender, no visceromegaly  Extremities: No edema, sinus or clubbing  Neuro: No focal motor deficit. Impression: This is an elderly male who has known history of hypertension and hyperlipidemia. As a part of his recent weight loss he underwent work-up including CT scan of abdomen which showed pancreatic cancer with metastasis to liver and lungs. He was admitted because of hypotension and hypoxia. His CT scan of chest showed tensive right lung pulmonary embolism with right ventricular strain. Plan:   1. Acute pulmonary embolism  Patient has acute right lung extensive pulm embolism with right ventricular strain. Preliminary results of his leg Doppler showed bilateral DVT. Was earlier seen by IR. He was ruled out to be thrombectomy candidate. He is not a candidate for tPA because of his extensive metastasis to liver and lungs. CT scan of head is not available to see if he has any metastasis to his brain. We will treat him with IV heparin, keep his PTT between 55 and 70  He may qualify for IVC filter placement  On room air    2. Pancreatic cancer with metastasis to liver and lungs  Supportive care. Continue supplemental oxygen. Goals of care needs to be discussed with patient and family considering his advanced stage of disease.     Questions of patient were answered at bedside in detail  Case discussed in detail with RN, RT, and care team  Thank you for involving me in the care of this patient  I will follow with you closely during hospitalization    Time spent more than 30 minutes under patient care with no overlap reviewing results and records, decision making, and answering questions.     Audrey Paniagua MD  Pulmonary/CC

## 2022-09-19 LAB
ALBUMIN SERPL-MCNC: 1.7 G/DL (ref 3.5–5)
ALBUMIN/GLOB SERPL: 0.4 {RATIO} (ref 1.1–2.2)
ALP SERPL-CCNC: 156 U/L (ref 45–117)
ALT SERPL-CCNC: 21 U/L (ref 12–78)
ANION GAP SERPL CALC-SCNC: 7 MMOL/L (ref 5–15)
APTT PPP: 115 SEC (ref 21.2–34.1)
APTT PPP: 77.6 SEC (ref 21.2–34.1)
APTT PPP: 84.5 SEC (ref 21.2–34.1)
AST SERPL W P-5'-P-CCNC: 33 U/L (ref 15–37)
ATRIAL RATE: 92 BPM
BASOPHILS # BLD: 0 K/UL (ref 0–0.1)
BASOPHILS NFR BLD: 0 % (ref 0–1)
BILIRUB SERPL-MCNC: 0.8 MG/DL (ref 0.2–1)
BUN SERPL-MCNC: 10 MG/DL (ref 6–20)
BUN/CREAT SERPL: 17 (ref 12–20)
CA-I BLD-MCNC: 8.3 MG/DL (ref 8.5–10.1)
CALCULATED P AXIS, ECG09: 51 DEGREES
CALCULATED R AXIS, ECG10: 24 DEGREES
CALCULATED T AXIS, ECG11: -34 DEGREES
CHLORIDE SERPL-SCNC: 104 MMOL/L (ref 97–108)
CO2 SERPL-SCNC: 24 MMOL/L (ref 21–32)
CREAT SERPL-MCNC: 0.6 MG/DL (ref 0.7–1.3)
CRP SERPL-MCNC: 16.6 MG/DL (ref 0–0.6)
DIAGNOSIS, 93000: NORMAL
DIFFERENTIAL METHOD BLD: ABNORMAL
EOSINOPHIL # BLD: 0.1 K/UL (ref 0–0.4)
EOSINOPHIL NFR BLD: 1 % (ref 0–7)
ERYTHROCYTE [DISTWIDTH] IN BLOOD BY AUTOMATED COUNT: 14 % (ref 11.5–14.5)
GLOBULIN SER CALC-MCNC: 4.5 G/DL (ref 2–4)
GLUCOSE SERPL-MCNC: 102 MG/DL (ref 65–100)
HCT VFR BLD AUTO: 32.4 % (ref 36.6–50.3)
HGB BLD-MCNC: 10.6 G/DL (ref 12.1–17)
IMM GRANULOCYTES # BLD AUTO: 0.1 K/UL (ref 0–0.04)
IMM GRANULOCYTES NFR BLD AUTO: 1 % (ref 0–0.5)
LYMPHOCYTES # BLD: 1.2 K/UL (ref 0.8–3.5)
LYMPHOCYTES NFR BLD: 9 % (ref 12–49)
MCH RBC QN AUTO: 27.6 PG (ref 26–34)
MCHC RBC AUTO-ENTMCNC: 32.7 G/DL (ref 30–36.5)
MCV RBC AUTO: 84.4 FL (ref 80–99)
MONOCYTES # BLD: 1.1 K/UL (ref 0–1)
MONOCYTES NFR BLD: 8 % (ref 5–13)
NEUTS SEG # BLD: 10.8 K/UL (ref 1.8–8)
NEUTS SEG NFR BLD: 81 % (ref 32–75)
NRBC # BLD: 0 K/UL (ref 0–0.01)
NRBC BLD-RTO: 0 PER 100 WBC
P-R INTERVAL, ECG05: 144 MS
PLATELET # BLD AUTO: 431 K/UL (ref 150–400)
PMV BLD AUTO: 8.7 FL (ref 8.9–12.9)
POTASSIUM SERPL-SCNC: 3.7 MMOL/L (ref 3.5–5.1)
PROCALCITONIN SERPL-MCNC: 0.82 NG/ML
PROT SERPL-MCNC: 6.2 G/DL (ref 6.4–8.2)
Q-T INTERVAL, ECG07: 380 MS
QRS DURATION, ECG06: 88 MS
QTC CALCULATION (BEZET), ECG08: 469 MS
RBC # BLD AUTO: 3.84 M/UL (ref 4.1–5.7)
SODIUM SERPL-SCNC: 135 MMOL/L (ref 136–145)
THERAPEUTIC RANGE,PTTT: ABNORMAL SEC (ref 82–109)
VENTRICULAR RATE, ECG03: 92 BPM
WBC # BLD AUTO: 13.3 K/UL (ref 4.1–11.1)

## 2022-09-19 PROCEDURE — 99232 SBSQ HOSP IP/OBS MODERATE 35: CPT | Performed by: INTERNAL MEDICINE

## 2022-09-19 PROCEDURE — 85730 THROMBOPLASTIN TIME PARTIAL: CPT

## 2022-09-19 PROCEDURE — 74011250637 HC RX REV CODE- 250/637: Performed by: INTERNAL MEDICINE

## 2022-09-19 PROCEDURE — 36415 COLL VENOUS BLD VENIPUNCTURE: CPT

## 2022-09-19 PROCEDURE — 85025 COMPLETE CBC W/AUTO DIFF WBC: CPT

## 2022-09-19 PROCEDURE — 74011250637 HC RX REV CODE- 250/637: Performed by: HOSPITALIST

## 2022-09-19 PROCEDURE — 74011000258 HC RX REV CODE- 258: Performed by: HOSPITALIST

## 2022-09-19 PROCEDURE — 74011000250 HC RX REV CODE- 250: Performed by: HOSPITALIST

## 2022-09-19 PROCEDURE — 80053 COMPREHEN METABOLIC PANEL: CPT

## 2022-09-19 PROCEDURE — 86140 C-REACTIVE PROTEIN: CPT

## 2022-09-19 PROCEDURE — 84145 PROCALCITONIN (PCT): CPT

## 2022-09-19 PROCEDURE — 74011250636 HC RX REV CODE- 250/636: Performed by: INTERNAL MEDICINE

## 2022-09-19 PROCEDURE — 65270000029 HC RM PRIVATE

## 2022-09-19 PROCEDURE — 74011250636 HC RX REV CODE- 250/636: Performed by: HOSPITALIST

## 2022-09-19 PROCEDURE — 99222 1ST HOSP IP/OBS MODERATE 55: CPT | Performed by: SURGERY

## 2022-09-19 RX ORDER — LOPERAMIDE HYDROCHLORIDE 2 MG/1
2 CAPSULE ORAL
Status: DISCONTINUED | OUTPATIENT
Start: 2022-09-19 | End: 2022-09-22 | Stop reason: HOSPADM

## 2022-09-19 RX ADMIN — PIPERACILLIN AND TAZOBACTAM 3.38 G: 3; .375 INJECTION, POWDER, LYOPHILIZED, FOR SOLUTION INTRAVENOUS at 11:52

## 2022-09-19 RX ADMIN — PIPERACILLIN AND TAZOBACTAM 3.38 G: 3; .375 INJECTION, POWDER, LYOPHILIZED, FOR SOLUTION INTRAVENOUS at 04:18

## 2022-09-19 RX ADMIN — LOPERAMIDE HYDROCHLORIDE 2 MG: 2 CAPSULE ORAL at 20:47

## 2022-09-19 RX ADMIN — PANTOPRAZOLE SODIUM 40 MG: 20 TABLET, DELAYED RELEASE ORAL at 08:23

## 2022-09-19 RX ADMIN — HEPARIN SODIUM 28 UNITS/KG/HR: 10000 INJECTION, SOLUTION INTRAVENOUS at 17:34

## 2022-09-19 RX ADMIN — SODIUM CHLORIDE, PRESERVATIVE FREE 10 ML: 5 INJECTION INTRAVENOUS at 14:44

## 2022-09-19 RX ADMIN — HEPARIN SODIUM 2580 UNITS: 1000 INJECTION, SOLUTION INTRAVENOUS; SUBCUTANEOUS at 09:20

## 2022-09-19 RX ADMIN — LOPERAMIDE HYDROCHLORIDE 2 MG: 2 CAPSULE ORAL at 11:52

## 2022-09-19 RX ADMIN — MIRTAZAPINE 15 MG: 15 TABLET, FILM COATED ORAL at 20:47

## 2022-09-19 RX ADMIN — MEGESTROL ACETATE 40 MG: 20 TABLET ORAL at 20:47

## 2022-09-19 RX ADMIN — SODIUM CHLORIDE, PRESERVATIVE FREE 10 ML: 5 INJECTION INTRAVENOUS at 05:57

## 2022-09-19 RX ADMIN — HEPARIN SODIUM 28 UNITS/KG/HR: 10000 INJECTION, SOLUTION INTRAVENOUS at 00:27

## 2022-09-19 RX ADMIN — ASPIRIN 81 MG: 81 TABLET, COATED ORAL at 08:23

## 2022-09-19 RX ADMIN — PIPERACILLIN AND TAZOBACTAM 3.38 G: 3; .375 INJECTION, POWDER, LYOPHILIZED, FOR SOLUTION INTRAVENOUS at 20:47

## 2022-09-19 RX ADMIN — SODIUM CHLORIDE, PRESERVATIVE FREE 10 ML: 5 INJECTION INTRAVENOUS at 20:54

## 2022-09-19 NOTE — PROGRESS NOTES
Chart reviewed, DCP remains for patient to d/c to home with [facility], [address] [place] [address], updates sent via [employer]. CM continues to follow and monitor for needs.

## 2022-09-19 NOTE — PROGRESS NOTES
Physician Progress Note      PATIENT:               Kalpana Bañuelos  CSN #:                  [id number]  :                       1950  ADMIT DATE:       9/15/2022 6:10 PM  DISCH DATE:  RESPONDING  PROVIDER #:        Muriel Thomason MD          QUERY TEXT:    Dear Dr. Steve Fierro,    Pt admitted with pulmonary embolism with heart strain, BLE DVTs, metastatic pancreatic cancer, dehydration, hypotension. Pt noted to have hypotension requiring IV fluids and IV vaspressor. If possible, please document in the progress notes and discharge summary if you are evaluating and/or treating any of the following: The medical record reflects the following:  Risk Factors: 71 M transferred from outside ED with persistent hypotension, sepsis, pancreatic cancer, elevated troponins  Clinical Indicators: inital BP 80/50 with tachycardia; subsequent BPs 87/55. ..95/57. ..97/56. ..96/64. ..111/63. ..114/72. ..93/62. ..84/57. .. 90/59. .. 82/58. ..92/53. ..84/51. ..85/52. ..86/59. .. 88/55; patient with acute PE with RV strain and BLE DVTs; per ID, patient with probable sepsis; per Attending documentation , \"Persistent hypotension most likely 2/2 increased strain from the heart from PE\"  Treatment: labs, CXR, CT, IV fluids, IV Norepinephrine, IV Heparin, ID/Cardiology/Pulmonology/Vascular consults    Thank you,  SANTIAGO Jordan, RN, CRCR  Clinical   [email or web address] or contact via Perfect [employer]  Options provided:  -- Cardiogenic Shock  -- Septic Shock  -- Hypovolemic Shock  -- Hypovolemia without Shock  -- Hypotension without Shock  -- Other - I will add my own diagnosis  -- Disagree - Not applicable / Not valid  -- Disagree - Clinically unable to determine / Unknown  -- Refer to Clinical Documentation Reviewer    PROVIDER RESPONSE TEXT:    This patient has Cardiogenic Shock.     Query created by: Ayo Fall on 2022 8:58 AM      Electronically signed by:  Muriel Thomason MD 2022 6:18 PM

## 2022-09-19 NOTE — PROGRESS NOTES
Problem: Falls - Risk of  Goal: *Absence of Falls  Description: Document Reece Marinelli Fall Risk and appropriate interventions in the flowsheet. Outcome: Progressing Towards Goal  Note: Fall Risk Interventions:            Medication Interventions: Bed/chair exit alarm, Patient to call before getting OOB, Teach patient to arise slowly    Elimination Interventions: Bed/chair exit alarm, Call light in reach              Problem: Pressure Injury - Risk of  Goal: *Prevention of pressure injury  Description: Document Erlin Scale and appropriate interventions in the flowsheet.   Outcome: Progressing Towards Goal  Note: Pressure Injury Interventions:  Sensory Interventions: Float heels    Moisture Interventions: Absorbent underpads, Internal/External urinary devices    Activity Interventions: Increase time out of bed, PT/OT evaluation    Mobility Interventions: Float heels, HOB 30 degrees or less, PT/OT evaluation    Nutrition Interventions: Document food/fluid/supplement intake    Friction and Shear Interventions: Apply protective barrier, creams and emollients, HOB 30 degrees or less

## 2022-09-19 NOTE — CONSULTS
History and Physical    Chief complaints: PE and DVT   History of Presenting Illness:  Susan Osuna is a 71 y.o. very pleasant gentleman currently hospitalized with a PE. Patient examined this morning. I was requested for IVC filter placement. Patient appears to be comfortable. Patient without any distress denies any chest pain shortness of breath. Bilateral lower extremity examination shows palpable femoral pulses. Mild swelling noted bilateral lower extremity. Next      Past Medical History:   Diagnosis Date    Diverticulosis 4/19/2022    Hypercholesterolemia     Hyperlipemia     Hypertension     Type II diabetes mellitus ([address] 4/19/2022    Weight loss 4/19/2022      Past Surgical History:   Procedure Laterality Date    COLONOSCOPY  05/13/2015    colon screen    ENDOSCOPY VISIT-OUTPATIENT  04/20/2022     Family History   Problem Relation Age of Onset    Stroke Mother     Stroke Father     Hypertension Other     High Cholesterol Other     Heart Disease Other       Social History     Tobacco Use    Smoking status: Never    Smokeless tobacco: Never   Substance Use Topics    Alcohol use: Never       Prior to Admission medications    Medication Sig Start Date End Date Taking? Authorizing Provider   oxyCODONE IR (ROXICODONE) 5 mg immediate release tablet Take 5 mg by mouth every six (6) hours as needed for Severe Pain. 9/12/22  Yes Provider, Historical   mirtazapine (REMERON) 15 mg tablet Take 15 mg by mouth nightly. 9/12/22  Yes Provider, Historical   omeprazole (PRILOSEC) 40 mg capsule Take 40 mg by mouth Daily (before breakfast). 9/12/22  Yes Provider, Historical   valsartan (DIOVAN) 320 mg tablet Take 320 mg by mouth daily. Yes Provider, Historical   tamsulosin (FLOMAX) 0.4 mg capsule Take 0.4 mg by mouth daily. Yes Provider, Historical   aspirin delayed-release 81 mg tablet Take 81 mg by mouth daily. Yes Provider, Historical   amLODIPine (NORVASC) 10 mg tablet Take 10 mg by mouth daily. 4/1/22  Yes Provider, Historical   rosuvastatin (CRESTOR) 40 mg tablet Take 40 mg by mouth daily. 4/1/22  Yes Provider, Historical     No Known Allergies     Review of Systems:  Pertinent review of systems discussed in HPI, and rest of organ systems personally reviewed and they are negative. Objective:   Vital signs reviewed:      Visit Vitals  /75   Pulse 92   Temp 97.8 °F (36.6 °C)   Resp 18   Ht 5' 6\" (1.676 m)   Wt 144 lb (65.3 kg)   SpO2 95%   BMI 23.24 kg/m²       Physical Exam:   General appearance: Well nourished  Patient is awake and alert, not in particular distress. Head and neck atraumatic normocephalic. ENT shows normal oral mucosa, no jaundice no hoarse voice. Eyes: Pupil equal gaze appropriate. Cardiac system regular rate rhythm. Pulmonary: No audible wheeze. Chest wall: Chest wall excursion normal with respiration cycle, there is no deformity or chest trauma. Abdomen: Soft not tender or distended, bowel sounds active. There is no obvious palpable mass, or hernia. Neurologic: Nonfocal.  Cranial nerves intact, no new focal findings. Musculoskeletal system: Motor function normal limits, motor function 5 out of 5, range of motion normal in all 4 extremity  Skin: Warm and moist.  Hematologic system: No obvious bruising. Psychosocial: Appropriate and cooperative. Vascular examination: Lower and upper extremities warm to touch, no signs of ischemia or cyanosis. Data Review: Labs are reviewed. Discussed  Recent Results (from the past 24 hour(s))   PTT    Collection Time: 09/18/22 12:58 PM   Result Value Ref Range    aPTT 77.9 (H) 21.2 - 34.1 sec    aPTT, therapeutic range   82 - 109 sec   PTT    Collection Time: 09/18/22 11:09 PM   Result Value Ref Range    aPTT 115.0 (HH) 21.2 - 34.1 sec    aPTT, therapeutic range   82 - 109 sec             Imagings reviewed: discussed as below. No name on file.   Assessment:     Active Problems:    Carcinoma of head of pancreas ([address] (9/15/2022)      Hypotension (9/15/2022)      NSTEMI (non-ST elevated myocardial infarction) [address] [facility] (9/15/2022)        Plan:     I did review a bilateral lower extremity venous ultrasound. Patient has extensive deep vein thrombosis noted left leg. Patient does not appear to have phlegmasia. Angiogram shows a large pulmonary embolism right side with possible heart strain. However patient is asymptomatic    Patient is a good candidate for IVC filter placement. I will schedule for this tomorrow morning. I discussed with the patient rational for the IVC filter placement risks and benefits. Patient currently on anticoagulant therapy with heparin drip. Continue to transition oral anticoagulant therapy.

## 2022-09-19 NOTE — PROGRESS NOTES
Pulmonary & Critical Care Progress Note    Subjective:     Patient seen and examined in his room on the floor this afternoon, no acute events overnight. Case discussed in detail at the bedside with the patient and his family. Saturating well on room air, denies any chest pain/shortness of breath. White blood cell count down to 13.3 from 14.7, BMP stable. Currently on heparin drip for extensive bilateral DVTs and extensive right sided acute pulmonary embolism. IVC filter planned for tomorrow. Continue goals of care discussion, but he is currently undergoing an oncologic work-up through [facility] for metastatic pancreatic cancer. Tagged white blood cell scan pending  Currently on IV Zosyn  Appreciate assistance from infectious disease         No Known Allergies     Review of Systems: All 10 systems were reviewed. Positive pertinent findings are mentioned above. Rest of the examination review essentially unremarkable    Objective:   Blood pressure 107/67, pulse 97, temperature 98.1 °F (36.7 °C), resp. rate 18, height 5' 6\" (1.676 m), weight 65.3 kg (144 lb), SpO2 95 %. Temp (24hrs), Av.2 °F (36.8 °C), Min:97.8 °F (36.6 °C), Max:98.7 °F (37.1 °C)    DUPLEX LOWER EXT VENOUS BILAT   Final Result      CTA CHEST W OR W WO CONT   Final Result      1. Extensive right-sided acute pulmonary emboli. Borderline deviation of the   interventricular septum suggesting right heart strain   2. Metastatic disease to lungs and liver      Findings were phoned to the floor at 1531 hours to Jennifer the .  A   perfect serve text was sent to [address] [facility]    (Results Pending)      Data Review: CBC:   Recent Labs     22  0734 22  0710 22  0504   WBC 13.3* 14.7* 14.2*   RBC 3.84* 3.98* 3.67*   HGB 10.6* 11.1* 10.4*   HCT 32.4* 34.0* 31.7*   * 410* 313   GRANS 81* 82* 82*   LYMPH 9* 9* 9*   EOS 1 0 0       Liver Enzymes:   Recent Labs     22  0734   TP 6.2*   ALB 1.7* *       ABGs: No results for input(s): PH, PCO2, PO2, HCO3 in the last 72 hours. Current Facility-Administered Medications   Medication Dose Route Frequency    loperamide (IMODIUM) capsule 2 mg  2 mg Oral QID PRN    heparin (porcine) 1,000 unit/mL injection 5,150 Units  80 Units/kg (Adjusted) IntraVENous PRN    Or    heparin (porcine) 1,000 unit/mL injection 2,580 Units  40 Units/kg (Adjusted) IntraVENous PRN    heparin 25,000 units in D5W 250 ml infusion  18-36 Units/kg/hr (Adjusted) IntraVENous TITRATE    hydrALAZINE (APRESOLINE) 20 mg/mL injection 10 mg  10 mg IntraVENous QID PRN    oxyCODONE-acetaminophen (PERCOCET) 5-325 mg per tablet 1 Tablet  1 Tablet Oral Q4H PRN    traMADoL (ULTRAM) tablet 50 mg  50 mg Oral Q6H PRN    aspirin delayed-release tablet 81 mg  81 mg Oral DAILY    megestroL (MEGACE) tablet 40 mg  40 mg Oral BID    oxyCODONE IR (ROXICODONE) tablet 5 mg  5 mg Oral Q6H PRN    mirtazapine (REMERON) tablet 15 mg  15 mg Oral QHS    pantoprazole (PROTONIX) tablet 40 mg  40 mg Oral ACB    sodium chloride (NS) flush 5-40 mL  5-40 mL IntraVENous Q8H    sodium chloride (NS) flush 5-40 mL  5-40 mL IntraVENous PRN    acetaminophen (TYLENOL) tablet 650 mg  650 mg Oral Q6H PRN    Or    acetaminophen (TYLENOL) suppository 650 mg  650 mg Rectal Q6H PRN    ondansetron (ZOFRAN ODT) tablet 4 mg  4 mg Oral Q6H PRN    Or    ondansetron (ZOFRAN) injection 4 mg  4 mg IntraVENous Q6H PRN    piperacillin-tazobactam (ZOSYN) 3.375 g in 0.9% sodium chloride (MBP/ADV) 100 mL MBP  3.375 g IntraVENous Q8H        Exam:      This is an elderly male who is on nasal cannula oxygen. Currently is not in any distress. He is alert and oriented x3. Head normocephalic and atraumatic, pupils are round responsive to light sclera icteric and conjunctive are pink. Neck is supple. No cervical lymphadenopathy. Thyroid not enlarged  Chest: Fair entry of air bilaterally. Few basal rhonchi audible at right lung base.   No wheezing was appreciated  Heart: S1-S2 normal, and is tachycardic  Abdomen: Soft, nontender, no visceromegaly  Extremities: No edema, sinus or clubbing  Neuro: No focal motor deficit. Impression: This is an elderly male who has known history of hypertension and hyperlipidemia. As a part of his recent weight loss he underwent work-up including CT scan of abdomen which showed pancreatic cancer with metastasis to liver and lungs. He was admitted because of hypotension and hypoxia. His CT scan of chest showed tensive right lung pulmonary embolism with right ventricular strain. Plan:   1. Acute pulmonary embolism  Patient has acute right lung extensive pulm embolism with right ventricular strain. Preliminary results of his leg Doppler showed bilateral DVT. Was earlier seen by IR. He was ruled out to be thrombectomy candidate. He is not a candidate for tPA because of his extensive metastasis to liver and lungs. CT scan of head is not available to see if he has any metastasis to his brain. We will treat him with IV heparin, keep his PTT between 55 and 70  IVC filter placement tomorrow  On room air    2. Pancreatic cancer with metastasis to liver and lungs  Supportive care. Continue supplemental oxygen. Follows with [facility]    Goals of care needs to be discussed with patient and family considering his advanced stage of disease. Questions of patient were answered at bedside in detail  Case discussed in detail with RN, RT, and care team  Thank you for involving me in the care of this patient  I will follow with you closely during hospitalization    Time spent more than 30 minutes under patient care with no overlap reviewing results and records, decision making, and answering questions.       Bean Delaney DO  [facility] ([facility])

## 2022-09-19 NOTE — PROGRESS NOTES
Problem: Falls - Risk of  Goal: *Absence of Falls  Description: Document Clydene Bone Fall Risk and appropriate interventions in the flowsheet. Outcome: Progressing Towards Goal  Note: Fall Risk Interventions:            Medication Interventions: Bed/chair exit alarm, Patient to call before getting OOB, Teach patient to arise slowly    Elimination Interventions: Bed/chair exit alarm, Call light in reach              Problem: Patient Education: Go to Patient Education Activity  Goal: Patient/Family Education  Outcome: Progressing Towards Goal     Problem: Pressure Injury - Risk of  Goal: *Prevention of pressure injury  Description: Document Erlin Scale and appropriate interventions in the flowsheet.   Outcome: Progressing Towards Goal  Note: Pressure Injury Interventions:  Sensory Interventions: Float heels, Keep linens dry and wrinkle-free, Maintain/enhance activity level    Moisture Interventions: Absorbent underpads, Limit adult briefs    Activity Interventions: Increase time out of bed, PT/OT evaluation    Mobility Interventions: Float heels, HOB 30 degrees or less    Nutrition Interventions: Document food/fluid/supplement intake    Friction and Shear Interventions: Apply protective barrier, creams and emollients, HOB 30 degrees or less                Problem: Patient Education: Go to Patient Education Activity  Goal: Patient/Family Education  Outcome: Progressing Towards Goal

## 2022-09-19 NOTE — PROGRESS NOTES
Subjective:   Daily Progress Note: 9/19/2022 8:31 AM    Subjective: Asymptomatic. Has had on/off diarrhea though. Evaluated by Dr. Jesus Joseph: IVC filter tomorrow. I discussed with patient and family; updated on plans.  ------------------    9/18/22: Feels well. No CP. Discussed with patient and family. Tolerating Hep gtt. Switch to Eliquis tomorrow. O2 Sat 97% RA.  ----------------      9/17/22: Feels well; updated family, answered questions at bedside. Evaluated by Pulm, Card, IR. On IV Hep. Tolerating. Follows with [facility] for Pancreatic CA mgmt.  ------------------------------    9/16/22: Patient having back pain not controlled with tylenol. Patient does not use oxygen at home. Patient denies hemoptysis, wheezing, cough, or chest pain. Addendum:    CTA of the chest with extensive right-sided pulmonary embolism with borderline deviation of the interventricular septum and metastatic disease to lungs and liver. IR, vascular, and pulmonology consulted. IR, Dr. Kiki Hinojosa, recommending heparin drip with no acute intervention at this time. Vascular unavailable. Duplex pending.     Current Facility-Administered Medications   Medication Dose Route Frequency    loperamide (IMODIUM) capsule 2 mg  2 mg Oral QID PRN    heparin (porcine) 1,000 unit/mL injection 5,150 Units  80 Units/kg (Adjusted) IntraVENous PRN    Or    heparin (porcine) 1,000 unit/mL injection 2,580 Units  40 Units/kg (Adjusted) IntraVENous PRN    heparin 25,000 units in D5W 250 ml infusion  18-36 Units/kg/hr (Adjusted) IntraVENous TITRATE    hydrALAZINE (APRESOLINE) 20 mg/mL injection 10 mg  10 mg IntraVENous QID PRN    oxyCODONE-acetaminophen (PERCOCET) 5-325 mg per tablet 1 Tablet  1 Tablet Oral Q4H PRN    traMADoL (ULTRAM) tablet 50 mg  50 mg Oral Q6H PRN    aspirin delayed-release tablet 81 mg  81 mg Oral DAILY    megestroL (MEGACE) tablet 40 mg  40 mg Oral BID    oxyCODONE IR (ROXICODONE) tablet 5 mg  5 mg Oral Q6H PRN    mirtazapine (REMERON) tablet 15 mg 15 mg Oral QHS    pantoprazole (PROTONIX) tablet 40 mg  40 mg Oral ACB    sodium chloride (NS) flush 5-40 mL  5-40 mL IntraVENous Q8H    sodium chloride (NS) flush 5-40 mL  5-40 mL IntraVENous PRN    acetaminophen (TYLENOL) tablet 650 mg  650 mg Oral Q6H PRN    Or    acetaminophen (TYLENOL) suppository 650 mg  650 mg Rectal Q6H PRN    ondansetron (ZOFRAN ODT) tablet 4 mg  4 mg Oral Q6H PRN    Or    ondansetron (ZOFRAN) injection 4 mg  4 mg IntraVENous Q6H PRN    piperacillin-tazobactam (ZOSYN) 3.375 g in 0.9% sodium chloride (MBP/ADV) 100 mL MBP  3.375 g IntraVENous Q8H        Review of Systems: No SOB, CP, Nausea, HA. Back pain, intermittent diarrhea. Objective:     Visit Vitals  /67 (BP Patient Position: At rest;Lying)   Pulse 97   Temp 98.1 °F (36.7 °C)   Resp 18   Ht 5' 6\" (1.676 m)   Wt 65.3 kg (144 lb)   SpO2 95%   BMI 23.24 kg/m²    O2 Flow Rate (L/min): 2 l/min O2 Device: None (Room air)    Temp (24hrs), Av.2 °F (36.8 °C), Min:97.8 °F (36.6 °C), Max:98.7 °F (37.1 °C)      No intake/output data recorded.  1901 -  0700  In: 2134 [P.O.:480; I.V.:1654]  Out: 65 [Urine:956]    DUPLEX LOWER EXT VENOUS BILAT   Final Result      CTA CHEST W OR W WO CONT   Final Result      1. Extensive right-sided acute pulmonary emboli. Borderline deviation of the   interventricular septum suggesting right heart strain   2. Metastatic disease to lungs and liver      Findings were phoned to the floor at 1531 hours to Jennifer the . A   perfect serve text was sent to [facility] [address]    (Results Pending)        PHYSICAL EXAM:    Physical Exam  Vitals and nursing note reviewed. Constitutional:       Comments: Thinly built   HENT:      Head: Normocephalic and atraumatic. Cardiovascular:      Rate and Rhythm: Normal rate and regular rhythm. Pulmonary:      Effort: No respiratory distress. Breath sounds: No wheezing.    Abdominal:      General: Bowel sounds are normal. There is no distension. Palpations: Abdomen is soft. Tenderness: There is no abdominal tenderness. Musculoskeletal:      Right lower leg: No edema. Left lower leg: No edema. Neurological:      Mental Status: He is alert and oriented to person, place, and time. Psychiatric:         Mood and Affect: Mood normal.        Data Review    Recent Results (from the past 24 hour(s))   PTT    Collection Time: 09/18/22 11:09 PM   Result Value Ref Range    aPTT 115.0 (HH) 21.2 - 34.1 sec    aPTT, therapeutic range   82 - 717 sec   METABOLIC PANEL, COMPREHENSIVE    Collection Time: 09/19/22  7:34 AM   Result Value Ref Range    Sodium 135 (L) 136 - 145 mmol/L    Potassium 3.7 3.5 - 5.1 mmol/L    Chloride 104 97 - 108 mmol/L    CO2 24 21 - 32 mmol/L    Anion gap 7 5 - 15 mmol/L    Glucose 102 (H) 65 - 100 mg/dL    BUN 10 6 - 20 mg/dL    Creatinine 0.60 (L) 0.70 - 1.30 mg/dL    BUN/Creatinine ratio 17 12 - 20      GFR est AA >60 >60 ml/min/1.73m2    GFR est non-AA >60 >60 ml/min/1.73m2    Calcium 8.3 (L) 8.5 - 10.1 mg/dL    Bilirubin, total 0.8 0.2 - 1.0 mg/dL    AST (SGOT) 33 15 - 37 U/L    ALT (SGPT) 21 12 - 78 U/L    Alk.  phosphatase 156 (H) 45 - 117 U/L    Protein, total 6.2 (L) 6.4 - 8.2 g/dL    Albumin 1.7 (L) 3.5 - 5.0 g/dL    Globulin 4.5 (H) 2.0 - 4.0 g/dL    A-G Ratio 0.4 (L) 1.1 - 2.2     CBC WITH AUTOMATED DIFF    Collection Time: 09/19/22  7:34 AM   Result Value Ref Range    WBC 13.3 (H) 4.1 - 11.1 K/uL    RBC 3.84 (L) 4.10 - 5.70 M/uL    HGB 10.6 (L) 12.1 - 17.0 g/dL    HCT 32.4 (L) 36.6 - 50.3 %    MCV 84.4 80.0 - 99.0 FL    MCH 27.6 26.0 - 34.0 PG    MCHC 32.7 30.0 - 36.5 g/dL    RDW 14.0 11.5 - 14.5 %    PLATELET 693 (H) 179 - 400 K/uL    MPV 8.7 (L) 8.9 - 12.9 FL    NRBC 0.0 0.0  WBC    ABSOLUTE NRBC 0.00 0.00 - 0.01 K/uL    NEUTROPHILS 81 (H) 32 - 75 %    LYMPHOCYTES 9 (L) 12 - 49 %    MONOCYTES 8 5 - 13 %    EOSINOPHILS 1 0 - 7 %    BASOPHILS 0 0 - 1 %    IMMATURE GRANULOCYTES 1 (H) 0 - 0.5 %    ABS. NEUTROPHILS 10.8 (H) 1.8 - 8.0 K/UL    ABS. LYMPHOCYTES 1.2 0.8 - 3.5 K/UL    ABS. MONOCYTES 1.1 (H) 0.0 - 1.0 K/UL    ABS. EOSINOPHILS 0.1 0.0 - 0.4 K/UL    ABS. BASOPHILS 0.0 0.0 - 0.1 K/UL    ABS. IMM. GRANS. 0.1 (H) 0.00 - 0.04 K/UL    DF AUTOMATED     C REACTIVE PROTEIN, QT    Collection Time: 09/19/22  7:34 AM   Result Value Ref Range    C-Reactive protein 16.60 (H) 0.00 - 0.60 mg/dL   PROCALCITONIN    Collection Time: 09/19/22  7:34 AM   Result Value Ref Range    Procalcitonin 0.82 (H) 0 ng/mL   PTT    Collection Time: 09/19/22  7:34 AM   Result Value Ref Range    aPTT 77.6 (H) 21.2 - 34.1 sec    aPTT, therapeutic range   82 - 109 sec      Duplex LE 9/16/22:  Acute thrombus present in the right popliteal vein. Acute thrombus present in the right soleal vein. Acute thrombus present in the right posterior tibial vein. Acute thrombus present in the right peroneal vein. Acute occlusive thrombus present in the left common femoral vein. Acute occlusive thrombus present in the left proximal femoral vein. Acute occlusive thrombus present in the left mid femoral vein. Acute occlusive thrombus present in the left distal femoral vein. Acute occlusive thrombus present in the left popliteal vein. Acute occlusive thrombus present in the left gastrocnemius vein. Acute thrombus present in the left soleal vein. Acute thrombus present in the left posterior tibial vein. Acute thrombus present in the left peroneal vein. Acute thrombus present in the right gastrocnemius vein. Assessment/Plan:     Hospital Course:    Eli Calles is a 71year old male with a PMH of metastatic pancreatic cancer, diabetes, and hypertension who presents with syncope via EMS. EMS found sinus tachycardia with hypotension and started on IV fluids. In ED hypotensive. Despite given 3 L of IV fluids in total patient remained hypotensive and was started on Levophed.   Initial labs significant for WBC of 14.6, hemoglobin of 9.1, creatinine 1.77, lactic of 2.4, troponin of 1990, BUN of 22. Urinalysis with bacteria, amorphous crystals, and spermatozoa. CXR with patchy diffuse mild lung infiltrates and interstitial prominence. CT of the abdomen showed progression of hepatic metastatic disease, new splenic infarct versus splenic masses, CBD stent is in good position. CT of the chest showed grossly unchanged pulmonary nodules and nodular opacities without fibrosis. Patient was admitted for further management. Overnight levophed was weaned and BP remained stable off of Levophed. Patient was started on heparin drip and Zosyn. Cardiology, ID, and oncology consulted. CTA of the chest with extensive right-sided pulmonary embolism with borderline deviation of the interventricular septum and metastatic disease to lungs and liver. IR, vascular, and pulmonology consulted. IR, Dr. Gina Sheth, who is recommending a heparin drip with no acute intervention at this time. Vascular unavailable. Duplex pending.  ECHO showed LVEF of 55-60% with moderately reduced systolic function, global hypokinesis, moderate pericardial effusion and dilated LA/RA.  ------------------------------    Pulmonary embolism w/ RV Strain & Bilat LE DVT  CTA of the chest with extensive right-sided pulmonary embolism with borderline deviation of the interventricular septum and metastatic disease to lungs and liver  Was discussed with IR, Dr. Gina Sheth, recommending a heparin drip with no acute intervention at this time  IR, vascular, and pulmonology consulted  Cont Hep gtt --> Eliquis in 1-2 days  IVC Filter per Dr. Patrick Morris tomorrow    Leukocytosis  most likely reactive vs infectious  Abnormal UA + bacteria, amorphous crystals, and spermatoza  CXR with patchy diffuse mild lung infiltrates and interstitial prominence  CT of the chest showed grossly unchanged pulmonary nodules and nodular opacities without fibrosis  9/15 blood: NGTD, prelim  Continue on Zosyn   ID consulted/Jennifer  WBC Scan pending    Demand Ischemia with Troponin Elevation d/t PE  Cardiology on consult    Metastatic pancreatic cancer with obstructive jaundice   S/p stent (mets to lung and liver with a splenic infarct versus mets)  Port-A-Cath soon for chemotherapy, established at [facility]  Pain control  Oncology consulted    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

## 2022-09-19 NOTE — PROGRESS NOTES
Progress Note    Patient: Jaswinder Dias MRN: [id number]  SSN: [id number]    YOB: 1950  Age: 71 y.o. Sex: male      Admit Date: 9/15/2022    LOS: 4 days     Subjective:   Patient with metastatic pancreatic cancer, followed for suspected sepsis with possible UTI but no urine culture identified. Patient remains afebrile with persistent leukocytosis and elevated CRP. Remains on IV Zosyn. Patient seen by Vascular Surgery for IVC placement, scheduled for tomorrow morning. Ceretec scan ordered. Objective:     Vitals:    09/19/22 0419 09/19/22 0800 09/19/22 1225 09/19/22 1513   BP: 115/75 118/76 116/76 107/67   Pulse: 92 89 94 97   Resp: 18 18 18 18   Temp: 97.8 °F (36.6 °C) 98.7 °F (37.1 °C) 98.3 °F (36.8 °C) 98.1 °F (36.7 °C)   SpO2: 95% 92% 94% 95%   Weight:       Height:            Intake and Output:  Current Shift: No intake/output data recorded. Last three shifts: 09/17 1901 - 09/19 0700  In: 2134 [P.O.:480; I.V.:1654]  Out: 956 [Urine:956]    Physical Exam:   Vitals and nursing note reviewed. Exam conducted with a chaperone present (Wife, daughter). Constitutional:       General: He is not in acute distress. Appearance: He is ill-appearing. Comments: Cachectic      HENT: unremarkable  Cardiovascular:      Rate and Rhythm: Normal rate and regular rhythm. Heart sounds: No murmur heard. Pulmonary:      Effort: Pulmonary effort is normal.      Breath sounds: Normal breath sounds. Abdominal:      General: Bowel sounds are normal. There is no distension. Palpations: Abdomen is soft. Tenderness: There is no abdominal tenderness. Genitourinary:     Comments: External urinary device  Musculoskeletal:      Right lower leg: No edema. Left lower leg: No edema. Neurological:      General: No focal deficit present. Mental Status: He is alert and oriented to person, place, and time.      Lab/Data Review:     WBC 13,300    CRP 16.60 <20.50 <16.30 <16.90  Procal 0.82 <0.98 <1.10 <1.54    Blood cultures (9/15) No growth 4 days  Blood cultures (9/15) No growth 4 days     Assessment:     Active Problems:    Carcinoma of head of pancreas ([facility] [address] (9/15/2022)      Hypotension (9/15/2022)      NSTEMI (non-ST elevated myocardial infarction) ([facility] [address] (9/15/2022)  Probable sepsis with leukocytosis, elevated CRP and procal, etiology unclear, slight improvement  Metastatic pancreatic cancer to lung and liver     Comment:  WBC and CRP increased today while procal decreased. Difficult to interpret but not entirely certain that this reflects infection. Plan:     Continue IV Zosyn empirically  Follow-up blood cultures  3. In am, repeat CBC, CRP and procal  4. Awaiting Ceretec whole body scan   5.   IVC placement tomorrow    Signed By: Angie Robles MD     September 19, 2022

## 2022-09-20 ENCOUNTER — APPOINTMENT (OUTPATIENT)
Dept: NUCLEAR MEDICINE | Age: 72
DRG: 871 | End: 2022-09-20
Attending: INTERNAL MEDICINE
Payer: MEDICARE

## 2022-09-20 LAB
ALBUMIN SERPL-MCNC: 1.7 G/DL (ref 3.5–5)
ALBUMIN/GLOB SERPL: 0.4 {RATIO} (ref 1.1–2.2)
ALP SERPL-CCNC: 159 U/L (ref 45–117)
ALT SERPL-CCNC: 20 U/L (ref 12–78)
ANION GAP SERPL CALC-SCNC: 8 MMOL/L (ref 5–15)
APTT PPP: 90.2 SEC (ref 21.2–34.1)
AST SERPL W P-5'-P-CCNC: 29 U/L (ref 15–37)
ATRIAL RATE: 108 BPM
BASOPHILS # BLD: 0 K/UL (ref 0–0.1)
BASOPHILS NFR BLD: 0 % (ref 0–1)
BILIRUB SERPL-MCNC: 0.7 MG/DL (ref 0.2–1)
BUN SERPL-MCNC: 9 MG/DL (ref 6–20)
BUN/CREAT SERPL: 15 (ref 12–20)
CA-I BLD-MCNC: 8 MG/DL (ref 8.5–10.1)
CALCULATED P AXIS, ECG09: 39 DEGREES
CALCULATED R AXIS, ECG10: 29 DEGREES
CALCULATED T AXIS, ECG11: -124 DEGREES
CHLORIDE SERPL-SCNC: 104 MMOL/L (ref 97–108)
CO2 SERPL-SCNC: 25 MMOL/L (ref 21–32)
CREAT SERPL-MCNC: 0.6 MG/DL (ref 0.7–1.3)
CRP SERPL-MCNC: 13.7 MG/DL (ref 0–0.6)
DIAGNOSIS, 93000: NORMAL
DIFFERENTIAL METHOD BLD: ABNORMAL
EOSINOPHIL # BLD: 0.1 K/UL (ref 0–0.4)
EOSINOPHIL NFR BLD: 1 % (ref 0–7)
ERYTHROCYTE [DISTWIDTH] IN BLOOD BY AUTOMATED COUNT: 13.6 % (ref 11.5–14.5)
GLOBULIN SER CALC-MCNC: 4.3 G/DL (ref 2–4)
GLUCOSE SERPL-MCNC: 94 MG/DL (ref 65–100)
HCT VFR BLD AUTO: 30.2 % (ref 36.6–50.3)
HGB BLD-MCNC: 10 G/DL (ref 12.1–17)
IMM GRANULOCYTES # BLD AUTO: 0.1 K/UL (ref 0–0.04)
IMM GRANULOCYTES NFR BLD AUTO: 1 % (ref 0–0.5)
LYMPHOCYTES # BLD: 1.2 K/UL (ref 0.8–3.5)
LYMPHOCYTES NFR BLD: 10 % (ref 12–49)
MAGNESIUM SERPL-MCNC: 1.8 MG/DL (ref 1.6–2.4)
MCH RBC QN AUTO: 27.9 PG (ref 26–34)
MCHC RBC AUTO-ENTMCNC: 33.1 G/DL (ref 30–36.5)
MCV RBC AUTO: 84.4 FL (ref 80–99)
MONOCYTES # BLD: 1 K/UL (ref 0–1)
MONOCYTES NFR BLD: 8 % (ref 5–13)
NEUTS SEG # BLD: 9.6 K/UL (ref 1.8–8)
NEUTS SEG NFR BLD: 80 % (ref 32–75)
NRBC # BLD: 0 K/UL (ref 0–0.01)
NRBC BLD-RTO: 0 PER 100 WBC
P-R INTERVAL, ECG05: 136 MS
PHOSPHATE SERPL-MCNC: 3 MG/DL (ref 2.6–4.7)
PLATELET # BLD AUTO: 438 K/UL (ref 150–400)
PMV BLD AUTO: 9.3 FL (ref 8.9–12.9)
POTASSIUM SERPL-SCNC: 3.4 MMOL/L (ref 3.5–5.1)
PROCALCITONIN SERPL-MCNC: 0.49 NG/ML
PROT SERPL-MCNC: 6 G/DL (ref 6.4–8.2)
Q-T INTERVAL, ECG07: 352 MS
QRS DURATION, ECG06: 92 MS
QTC CALCULATION (BEZET), ECG08: 471 MS
RBC # BLD AUTO: 3.58 M/UL (ref 4.1–5.7)
SODIUM SERPL-SCNC: 137 MMOL/L (ref 136–145)
THERAPEUTIC RANGE,PTTT: ABNORMAL SEC (ref 82–109)
VENTRICULAR RATE, ECG03: 108 BPM
WBC # BLD AUTO: 12.1 K/UL (ref 4.1–11.1)

## 2022-09-20 PROCEDURE — 74011250636 HC RX REV CODE- 250/636: Performed by: INTERNAL MEDICINE

## 2022-09-20 PROCEDURE — 37191 INS ENDOVAS VENA CAVA FILTR: CPT | Performed by: SURGERY

## 2022-09-20 PROCEDURE — 74011250637 HC RX REV CODE- 250/637: Performed by: INTERNAL MEDICINE

## 2022-09-20 PROCEDURE — 85025 COMPLETE CBC W/AUTO DIFF WBC: CPT

## 2022-09-20 PROCEDURE — 84100 ASSAY OF PHOSPHORUS: CPT

## 2022-09-20 PROCEDURE — 76937 US GUIDE VASCULAR ACCESS: CPT | Performed by: SURGERY

## 2022-09-20 PROCEDURE — 06H03DZ INSERTION OF INTRALUMINAL DEVICE INTO INFERIOR VENA CAVA, PERCUTANEOUS APPROACH: ICD-10-PCS | Performed by: SURGERY

## 2022-09-20 PROCEDURE — 74011000636 HC RX REV CODE- 636: Performed by: SURGERY

## 2022-09-20 PROCEDURE — 86140 C-REACTIVE PROTEIN: CPT

## 2022-09-20 PROCEDURE — 74011250636 HC RX REV CODE- 250/636: Performed by: HOSPITALIST

## 2022-09-20 PROCEDURE — 65270000029 HC RM PRIVATE

## 2022-09-20 PROCEDURE — 74011250637 HC RX REV CODE- 250/637: Performed by: HOSPITALIST

## 2022-09-20 PROCEDURE — C1894 INTRO/SHEATH, NON-LASER: HCPCS | Performed by: SURGERY

## 2022-09-20 PROCEDURE — 74011000250 HC RX REV CODE- 250: Performed by: SURGERY

## 2022-09-20 PROCEDURE — 93005 ELECTROCARDIOGRAM TRACING: CPT

## 2022-09-20 PROCEDURE — 80053 COMPREHEN METABOLIC PANEL: CPT

## 2022-09-20 PROCEDURE — 85730 THROMBOPLASTIN TIME PARTIAL: CPT

## 2022-09-20 PROCEDURE — 84145 PROCALCITONIN (PCT): CPT

## 2022-09-20 PROCEDURE — 36415 COLL VENOUS BLD VENIPUNCTURE: CPT

## 2022-09-20 PROCEDURE — C1880 VENA CAVA FILTER: HCPCS | Performed by: SURGERY

## 2022-09-20 PROCEDURE — C1769 GUIDE WIRE: HCPCS | Performed by: SURGERY

## 2022-09-20 PROCEDURE — 83735 ASSAY OF MAGNESIUM: CPT

## 2022-09-20 PROCEDURE — 74011000250 HC RX REV CODE- 250: Performed by: HOSPITALIST

## 2022-09-20 PROCEDURE — 74011000258 HC RX REV CODE- 258: Performed by: HOSPITALIST

## 2022-09-20 PROCEDURE — A9521 TC99M EXAMETAZIME: HCPCS

## 2022-09-20 PROCEDURE — 99232 SBSQ HOSP IP/OBS MODERATE 35: CPT | Performed by: INTERNAL MEDICINE

## 2022-09-20 DEVICE — OPTION ELITE RETRIEVABLE VENA CAVA FILTER SUITABLE FOR JUGULAR OR FEMORAL DELIVERY
Type: IMPLANTABLE DEVICE | Status: FUNCTIONAL
Brand: OPTION ELITE RETRIEVABLE VENA CAVA FILTER SYSTEM

## 2022-09-20 RX ORDER — LIDOCAINE HYDROCHLORIDE 10 MG/ML
INJECTION INFILTRATION; PERINEURAL AS NEEDED
Status: DISCONTINUED | OUTPATIENT
Start: 2022-09-20 | End: 2022-09-20 | Stop reason: HOSPADM

## 2022-09-20 RX ORDER — POTASSIUM CHLORIDE 20 MEQ/1
40 TABLET, EXTENDED RELEASE ORAL
Status: COMPLETED | OUTPATIENT
Start: 2022-09-20 | End: 2022-09-20

## 2022-09-20 RX ADMIN — PIPERACILLIN AND TAZOBACTAM 3.38 G: 3; .375 INJECTION, POWDER, LYOPHILIZED, FOR SOLUTION INTRAVENOUS at 20:32

## 2022-09-20 RX ADMIN — MEGESTROL ACETATE 40 MG: 20 TABLET ORAL at 09:08

## 2022-09-20 RX ADMIN — MEGESTROL ACETATE 40 MG: 20 TABLET ORAL at 20:33

## 2022-09-20 RX ADMIN — PIPERACILLIN AND TAZOBACTAM 3.38 G: 3; .375 INJECTION, POWDER, LYOPHILIZED, FOR SOLUTION INTRAVENOUS at 11:59

## 2022-09-20 RX ADMIN — MIRTAZAPINE 15 MG: 15 TABLET, FILM COATED ORAL at 21:41

## 2022-09-20 RX ADMIN — ASPIRIN 81 MG: 81 TABLET, COATED ORAL at 09:07

## 2022-09-20 RX ADMIN — POTASSIUM CHLORIDE 40 MEQ: 1500 TABLET, EXTENDED RELEASE ORAL at 20:33

## 2022-09-20 RX ADMIN — SODIUM CHLORIDE, PRESERVATIVE FREE 10 ML: 5 INJECTION INTRAVENOUS at 13:59

## 2022-09-20 RX ADMIN — SODIUM CHLORIDE, PRESERVATIVE FREE 10 ML: 5 INJECTION INTRAVENOUS at 21:41

## 2022-09-20 RX ADMIN — HEPARIN SODIUM 28 UNITS/KG/HR: 10000 INJECTION, SOLUTION INTRAVENOUS at 07:00

## 2022-09-20 RX ADMIN — PIPERACILLIN AND TAZOBACTAM 3.38 G: 3; .375 INJECTION, POWDER, LYOPHILIZED, FOR SOLUTION INTRAVENOUS at 03:07

## 2022-09-20 RX ADMIN — SODIUM CHLORIDE, PRESERVATIVE FREE 10 ML: 5 INJECTION INTRAVENOUS at 05:00

## 2022-09-20 NOTE — PROGRESS NOTES
Problem: Falls - Risk of  Goal: *Absence of Falls  Description: Document [place] Flow Fall Risk and appropriate interventions in the flowsheet. Outcome: Progressing Towards Goal  Note: Fall Risk Interventions:  Mobility Interventions: Bed/chair exit alarm, Patient to call before getting OOB         Medication Interventions: Bed/chair exit alarm, Patient to call before getting OOB    Elimination Interventions: Bed/chair exit alarm, Call light in reach              Problem: Patient Education: Go to Patient Education Activity  Goal: Patient/Family Education  Outcome: Progressing Towards Goal     Problem: Pressure Injury - Risk of  Goal: *Prevention of pressure injury  Description: Document Erlin Scale and appropriate interventions in the flowsheet.   Outcome: Progressing Towards Goal  Note: Pressure Injury Interventions:  Sensory Interventions: Assess changes in LOC, Float heels, Keep linens dry and wrinkle-free, Maintain/enhance activity level    Moisture Interventions: Absorbent underpads, Limit adult briefs    Activity Interventions: PT/OT evaluation    Mobility Interventions: HOB 30 degrees or less    Nutrition Interventions: Document food/fluid/supplement intake    Friction and Shear Interventions: Apply protective barrier, creams and emollients, Minimize layers, HOB 30 degrees or less                Problem: Patient Education: Go to Patient Education Activity  Goal: Patient/Family Education  Outcome: Progressing Towards Goal

## 2022-09-20 NOTE — PROGRESS NOTES
Subjective:   Daily Progress Note: 9/20/2022 8:31 AM    Subjective: Asymptomatic. Has had on/off diarrhea though. Evaluated by Dr. Indu Esqueda: IVC filter today  I discussed with patient and family; updated on plans.  ------------------    9/18/22: Feels well. No CP. Discussed with patient and family. Tolerating Hep gtt. Switch to Eliquis tomorrow. O2 Sat 97% RA.  ----------------      9/17/22: Feels well; updated family, answered questions at bedside. Evaluated by Pulm, Card, IR. On IV Hep. Tolerating. Follows with [facility] for Pancreatic CA mgmt.  ------------------------------    9/16/22: Patient having back pain not controlled with tylenol. Patient does not use oxygen at home. Patient denies hemoptysis, wheezing, cough, or chest pain. Addendum:    CTA of the chest with extensive right-sided pulmonary embolism with borderline deviation of the interventricular septum and metastatic disease to lungs and liver. IR, vascular, and pulmonology consulted. IR, Dr. Audra Yarbrough, recommending heparin drip with no acute intervention at this time. Vascular unavailable. Duplex pending.     Current Facility-Administered Medications   Medication Dose Route Frequency    lidocaine (XYLOCAINE) 10 mg/mL (1 %) injection    PRN    loperamide (IMODIUM) capsule 2 mg  2 mg Oral QID PRN    heparin (porcine) 1,000 unit/mL injection 5,150 Units  80 Units/kg (Adjusted) IntraVENous PRN    Or    heparin (porcine) 1,000 unit/mL injection 2,580 Units  40 Units/kg (Adjusted) IntraVENous PRN    heparin 25,000 units in D5W 250 ml infusion  18-36 Units/kg/hr (Adjusted) IntraVENous TITRATE    hydrALAZINE (APRESOLINE) 20 mg/mL injection 10 mg  10 mg IntraVENous QID PRN    oxyCODONE-acetaminophen (PERCOCET) 5-325 mg per tablet 1 Tablet  1 Tablet Oral Q4H PRN    traMADoL (ULTRAM) tablet 50 mg  50 mg Oral Q6H PRN    aspirin delayed-release tablet 81 mg  81 mg Oral DAILY    megestroL (MEGACE) tablet 40 mg  40 mg Oral BID    oxyCODONE IR (ROXICODONE) tablet 5 mg  5 mg Oral Q6H PRN    mirtazapine (REMERON) tablet 15 mg  15 mg Oral QHS    pantoprazole (PROTONIX) tablet 40 mg  40 mg Oral ACB    sodium chloride (NS) flush 5-40 mL  5-40 mL IntraVENous Q8H    sodium chloride (NS) flush 5-40 mL  5-40 mL IntraVENous PRN    acetaminophen (TYLENOL) tablet 650 mg  650 mg Oral Q6H PRN    Or    acetaminophen (TYLENOL) suppository 650 mg  650 mg Rectal Q6H PRN    ondansetron (ZOFRAN ODT) tablet 4 mg  4 mg Oral Q6H PRN    Or    ondansetron (ZOFRAN) injection 4 mg  4 mg IntraVENous Q6H PRN    piperacillin-tazobactam (ZOSYN) 3.375 g in 0.9% sodium chloride (MBP/ADV) 100 mL MBP  3.375 g IntraVENous Q8H        Review of Systems: No SOB, CP, Nausea, HA. Back pain, intermittent diarrhea. Objective:     Visit Vitals  /83 (BP 1 Location: Left upper arm, BP Patient Position: Semi fowlers)   Pulse 99   Temp 98.1 °F (36.7 °C)   Resp 20   Ht 5' 6\" (1.676 m)   Wt 65.3 kg (144 lb)   SpO2 97%   BMI 23.24 kg/m²    O2 Flow Rate (L/min): 2 l/min O2 Device: None (Room air)    Temp (24hrs), Av.2 °F (36.8 °C), Min:97.7 °F (36.5 °C), Max:98.6 °F (37 °C)      701 - 1900  In: -   Out: 200 [Urine:200]  1901 -  0700  In: -   Out: 1400 [Urine:1400]    DUPLEX LOWER EXT VENOUS BILAT   Final Result      CTA CHEST W OR W WO CONT   Final Result      1. Extensive right-sided acute pulmonary emboli. Borderline deviation of the   interventricular septum suggesting right heart strain   2. Metastatic disease to lungs and liver      Findings were phoned to the floor at 1531 hours to Jennifer the . A   perfect serve text was sent to [address] [facility]    (Results Pending)        PHYSICAL EXAM:    Physical Exam  Vitals and nursing note reviewed. Constitutional:       Comments: Thinly built   HENT:      Head: Normocephalic and atraumatic. Cardiovascular:      Rate and Rhythm: Normal rate and regular rhythm. Pulmonary:      Effort: No respiratory distress. Breath sounds: No wheezing. Abdominal:      General: Bowel sounds are normal. There is no distension. Palpations: Abdomen is soft. Tenderness: There is no abdominal tenderness. Musculoskeletal:      Right lower leg: No edema. Left lower leg: No edema. Neurological:      Mental Status: He is alert and oriented to person, place, and time. Psychiatric:         Mood and Affect: Mood normal.        Data Review    Recent Results (from the past 24 hour(s))   PTT    Collection Time: 09/19/22  4:53 PM   Result Value Ref Range    aPTT 84.5 (H) 21.2 - 34.1 sec    aPTT, therapeutic range   82 - 743 sec   METABOLIC PANEL, COMPREHENSIVE    Collection Time: 09/20/22  1:25 AM   Result Value Ref Range    Sodium 137 136 - 145 mmol/L    Potassium 3.4 (L) 3.5 - 5.1 mmol/L    Chloride 104 97 - 108 mmol/L    CO2 25 21 - 32 mmol/L    Anion gap 8 5 - 15 mmol/L    Glucose 94 65 - 100 mg/dL    BUN 9 6 - 20 mg/dL    Creatinine 0.60 (L) 0.70 - 1.30 mg/dL    BUN/Creatinine ratio 15 12 - 20      GFR est AA >60 >60 ml/min/1.73m2    GFR est non-AA >60 >60 ml/min/1.73m2    Calcium 8.0 (L) 8.5 - 10.1 mg/dL    Bilirubin, total 0.7 0.2 - 1.0 mg/dL    AST (SGOT) 29 15 - 37 U/L    ALT (SGPT) 20 12 - 78 U/L    Alk.  phosphatase 159 (H) 45 - 117 U/L    Protein, total 6.0 (L) 6.4 - 8.2 g/dL    Albumin 1.7 (L) 3.5 - 5.0 g/dL    Globulin 4.3 (H) 2.0 - 4.0 g/dL    A-G Ratio 0.4 (L) 1.1 - 2.2     CBC WITH AUTOMATED DIFF    Collection Time: 09/20/22  1:25 AM   Result Value Ref Range    WBC 12.1 (H) 4.1 - 11.1 K/uL    RBC 3.58 (L) 4.10 - 5.70 M/uL    HGB 10.0 (L) 12.1 - 17.0 g/dL    HCT 30.2 (L) 36.6 - 50.3 %    MCV 84.4 80.0 - 99.0 FL    MCH 27.9 26.0 - 34.0 PG    MCHC 33.1 30.0 - 36.5 g/dL    RDW 13.6 11.5 - 14.5 %    PLATELET 102 (H) 998 - 400 K/uL    MPV 9.3 8.9 - 12.9 FL    NRBC 0.0 0.0  WBC    ABSOLUTE NRBC 0.00 0.00 - 0.01 K/uL    NEUTROPHILS 80 (H) 32 - 75 %    LYMPHOCYTES 10 (L) 12 - 49 %    MONOCYTES 8 5 - 13 % EOSINOPHILS 1 0 - 7 %    BASOPHILS 0 0 - 1 %    IMMATURE GRANULOCYTES 1 (H) 0 - 0.5 %    ABS. NEUTROPHILS 9.6 (H) 1.8 - 8.0 K/UL    ABS. LYMPHOCYTES 1.2 0.8 - 3.5 K/UL    ABS. MONOCYTES 1.0 0.0 - 1.0 K/UL    ABS. EOSINOPHILS 0.1 0.0 - 0.4 K/UL    ABS. BASOPHILS 0.0 0.0 - 0.1 K/UL    ABS. IMM. GRANS. 0.1 (H) 0.00 - 0.04 K/UL    DF AUTOMATED     MAGNESIUM    Collection Time: 09/20/22  1:25 AM   Result Value Ref Range    Magnesium 1.8 1.6 - 2.4 mg/dL   PHOSPHORUS    Collection Time: 09/20/22  1:25 AM   Result Value Ref Range    Phosphorus 3.0 2.6 - 4.7 mg/dL   C REACTIVE PROTEIN, QT    Collection Time: 09/20/22  1:25 AM   Result Value Ref Range    C-Reactive protein 13.70 (H) 0.00 - 0.60 mg/dL   PTT    Collection Time: 09/20/22  1:25 AM   Result Value Ref Range    aPTT 90.2 (H) 21.2 - 34.1 sec    aPTT, therapeutic range   82 - 109 sec      Duplex LE 9/16/22:  Acute thrombus present in the right popliteal vein. Acute thrombus present in the right soleal vein. Acute thrombus present in the right posterior tibial vein. Acute thrombus present in the right peroneal vein. Acute occlusive thrombus present in the left common femoral vein. Acute occlusive thrombus present in the left proximal femoral vein. Acute occlusive thrombus present in the left mid femoral vein. Acute occlusive thrombus present in the left distal femoral vein. Acute occlusive thrombus present in the left popliteal vein. Acute occlusive thrombus present in the left gastrocnemius vein. Acute thrombus present in the left soleal vein. Acute thrombus present in the left posterior tibial vein. Acute thrombus present in the left peroneal vein. Acute thrombus present in the right gastrocnemius vein. Assessment/Plan:     Hospital Course:    Shirley Erwni is a 71year old male with a PMH of metastatic pancreatic cancer, diabetes, and hypertension who presents with syncope via EMS.  EMS found sinus tachycardia with hypotension and started on IV fluids. In ED hypotensive. Despite given 3 L of IV fluids in total patient remained hypotensive and was started on Levophed. Initial labs significant for WBC of 14.6, hemoglobin of 9.1, creatinine 1.77, lactic of 2.4, troponin of 1990, BUN of 22. Urinalysis with bacteria, amorphous crystals, and spermatozoa. CXR with patchy diffuse mild lung infiltrates and interstitial prominence. CT of the abdomen showed progression of hepatic metastatic disease, new splenic infarct versus splenic masses, CBD stent is in good position. CT of the chest showed grossly unchanged pulmonary nodules and nodular opacities without fibrosis. Patient was admitted for further management. Overnight levophed was weaned and BP remained stable off of Levophed. Patient was started on heparin drip and Zosyn. Cardiology, ID, and oncology consulted. CTA of the chest with extensive right-sided pulmonary embolism with borderline deviation of the interventricular septum and metastatic disease to lungs and liver. IR, vascular, and pulmonology consulted. IR, Dr. Viri Yates, who is recommending a heparin drip with no acute intervention at this time. Vascular unavailable. Duplex pending.  ECHO showed LVEF of 55-60% with moderately reduced systolic function, global hypokinesis, moderate pericardial effusion and dilated LA/RA.  ------------------------------    Pulmonary embolism w/ RV Strain & Bilat LE DVT  CTA of the chest with extensive right-sided pulmonary embolism with borderline deviation of the interventricular septum and metastatic disease to lungs and liver  Was discussed with IR, Dr. Viri Yates, recommending a heparin drip with no acute intervention at this time  IR, vascular, and pulmonology consulted  Cont Hep gtt --> Eliquis in 1-2 days  IVC Filter per Dr. Madison Joyce tomorrow    Leukocytosis  most likely reactive vs infectious  Abnormal UA + bacteria, amorphous crystals, and spermatoza  CXR with patchy diffuse mild lung infiltrates and interstitial prominence  CT of the chest showed grossly unchanged pulmonary nodules and nodular opacities without fibrosis  9/15 blood: NGTD, prelim  Continue on Zosyn   ID consulted/Jennifer  WBC Scan pending    Demand Ischemia with Troponin Elevation d/t PE  Cardiology on consult    Metastatic pancreatic cancer with obstructive jaundice   S/p stent (mets to lung and liver with a splenic infarct versus mets)  Port-A-Cath soon for chemotherapy, established at [facility]  Pain control  Oncology consulted    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

## 2022-09-20 NOTE — PROGRESS NOTES
Progress Note    Patient: Eliza Coats MRN: [id number]  SSN: [id number]    YOB: 1950  Age: 71 y.o. Sex: male      Admit Date: 9/15/2022    LOS: 5 days     Subjective:   Patient with metastatic pancreatic cancer, followed for suspected sepsis with possible UTI but no urine culture identified. Patient remains afebrile with persistent leukocytosis and elevated CRP. Remains on IV Zosyn. Patient had IVC placement this morning and is off the floor at this time Ceretec scan. Wife and daughter in room. Objective:     Vitals:    09/20/22 0241 09/20/22 0800 09/20/22 0829 09/20/22 1030   BP: 130/82 115/75  127/83   Pulse: 95 92 91 99   Resp: 18 18 18 20   Temp: 97.7 °F (36.5 °C) 98.6 °F (37 °C) 98.3 °F (36.8 °C) 98.1 °F (36.7 °C)   SpO2: 95% 90% 95% 97%   Weight:       Height:            Intake and Output:  Current Shift: 09/20 0701 - 09/20 1900  In: -   Out: 200 [Urine:200]  Last three shifts: 09/18 1901 - 09/20 0700  In: -   Out: 1400 [Urine:1400]    Physical Exam:   Vitals and nursing note reviewed. Patient unavailable for re-examination at this time  Constitutional:       General: He is not in acute distress. Appearance: He is ill-appearing. Comments: Cachectic      HENT: unremarkable  Cardiovascular:      Rate and Rhythm: Normal rate and regular rhythm. Heart sounds: No murmur heard. Pulmonary:      Effort: Pulmonary effort is normal.      Breath sounds: Normal breath sounds. Abdominal:      General: Bowel sounds are normal. There is no distension. Palpations: Abdomen is soft. Tenderness: There is no abdominal tenderness. Genitourinary:     Comments: External urinary device  Musculoskeletal:      Right lower leg: No edema. Left lower leg: No edema. Neurological:      General: No focal deficit present. Mental Status: He is alert and oriented to person, place, and time.      Lab/Data Review:     WBC 12,100    CRP 13.70 <16.60 <20.50 <16.30 <16.90  Procal 0.82 <0.98 <1.10 <1.54    Blood cultures (9/15) No growth 5 days  Blood cultures (9/15) No growth 5 days     Assessment:     Active Problems:    Carcinoma of head of pancreas ([facility] [address] (9/15/2022)      Hypotension (9/15/2022)      NSTEMI (non-ST elevated myocardial infarction) ([facility] [address] (9/15/2022)  Probable sepsis with leukocytosis, elevated CRP and procal, etiology unclear, slight improvement  Metastatic pancreatic cancer to lung and liver     Comment:  WBC, CRP and procal decreasing on Zosyn. Plan:     Continue IV Zosyn empirically  Follow-up blood cultures  3. In am, repeat CBC, CRP and procal  4. Awaiting Ceretec whole body scan   5.    Reviewed lab results with patient's wife    Signed By: Yohana South MD     September 20, 2022

## 2022-09-20 NOTE — PROGRESS NOTES
Pulmonary & Critical Care Progress Note    Subjective:     Patient seen and examined in his room on the floor this afternoon, no acute events overnight. Case discussed in detail at the bedside with the patient and his family. Saturating well on room air, denies any chest pain/shortness of breath. White blood cell count down to 12 from 13 yesterday, BMP showing a potassium level 3.4. Will give 40 mill equivalents oral KCl today. Currently on heparin drip for extensive bilateral DVTs and extensive right sided acute pulmonary embolism. IVC filter has been placed today. Continue goals of care discussion, but he is currently undergoing an oncologic work-up through [facility] for metastatic pancreatic cancer. Tagged white blood cell scan pending  Currently on IV Zosyn  Appreciate assistance from infectious disease         No Known Allergies     Review of Systems: All 10 systems were reviewed. Positive pertinent findings are mentioned above. Rest of the examination review essentially unremarkable    Objective:   Blood pressure 116/74, pulse 73, temperature 97.9 °F (36.6 °C), resp. rate 20, height 5' 6\" (1.676 m), weight 65.3 kg (144 lb), SpO2 94 %. Temp (24hrs), Av.2 °F (36.8 °C), Min:97.7 °F (36.5 °C), Max:98.6 °F (37 °C)    DUPLEX LOWER EXT VENOUS BILAT   Final Result      CTA CHEST W OR W WO CONT   Final Result      1. Extensive right-sided acute pulmonary emboli. Borderline deviation of the   interventricular septum suggesting right heart strain   2. Metastatic disease to lungs and liver      Findings were phoned to the floor at 1531 hours to Jennifer the .  A   perfect serve text was sent to [address] [facility]    (Results Pending)      Data Review: CBC:   Recent Labs     22  0125 22  0734 22  0710   WBC 12.1* 13.3* 14.7*   RBC 3.58* 3.84* 3.98*   HGB 10.0* 10.6* 11.1*   HCT 30.2* 32.4* 34.0*   * 431* 410*   GRANS 80* 81* 82*   LYMPH 10* 9* 9*   EOS 1 1 0       Liver Enzymes:   Recent Labs     09/20/22  0125   TP 6.0*   ALB 1.7*   *       ABGs: No results for input(s): PH, PCO2, PO2, HCO3 in the last 72 hours. Current Facility-Administered Medications   Medication Dose Route Frequency    loperamide (IMODIUM) capsule 2 mg  2 mg Oral QID PRN    heparin (porcine) 1,000 unit/mL injection 5,150 Units  80 Units/kg (Adjusted) IntraVENous PRN    Or    heparin (porcine) 1,000 unit/mL injection 2,580 Units  40 Units/kg (Adjusted) IntraVENous PRN    heparin 25,000 units in D5W 250 ml infusion  18-36 Units/kg/hr (Adjusted) IntraVENous TITRATE    hydrALAZINE (APRESOLINE) 20 mg/mL injection 10 mg  10 mg IntraVENous QID PRN    oxyCODONE-acetaminophen (PERCOCET) 5-325 mg per tablet 1 Tablet  1 Tablet Oral Q4H PRN    traMADoL (ULTRAM) tablet 50 mg  50 mg Oral Q6H PRN    aspirin delayed-release tablet 81 mg  81 mg Oral DAILY    megestroL (MEGACE) tablet 40 mg  40 mg Oral BID    oxyCODONE IR (ROXICODONE) tablet 5 mg  5 mg Oral Q6H PRN    mirtazapine (REMERON) tablet 15 mg  15 mg Oral QHS    pantoprazole (PROTONIX) tablet 40 mg  40 mg Oral ACB    sodium chloride (NS) flush 5-40 mL  5-40 mL IntraVENous Q8H    sodium chloride (NS) flush 5-40 mL  5-40 mL IntraVENous PRN    acetaminophen (TYLENOL) tablet 650 mg  650 mg Oral Q6H PRN    Or    acetaminophen (TYLENOL) suppository 650 mg  650 mg Rectal Q6H PRN    ondansetron (ZOFRAN ODT) tablet 4 mg  4 mg Oral Q6H PRN    Or    ondansetron (ZOFRAN) injection 4 mg  4 mg IntraVENous Q6H PRN    piperacillin-tazobactam (ZOSYN) 3.375 g in 0.9% sodium chloride (MBP/ADV) 100 mL MBP  3.375 g IntraVENous Q8H        Exam:      This is an elderly male who is on nasal cannula oxygen. Currently is not in any distress. He is alert and oriented x3. Head normocephalic and atraumatic, pupils are round responsive to light sclera icteric and conjunctive are pink. Neck is supple. No cervical lymphadenopathy.   Thyroid not enlarged  Chest: Fair entry of air bilaterally. Few basal rhonchi audible at right lung base. No wheezing was appreciated  Heart: S1-S2 normal, and is tachycardic  Abdomen: Soft, nontender, no visceromegaly  Extremities: No edema, sinus or clubbing  Neuro: No focal motor deficit. Impression: This is an elderly male who has known history of hypertension and hyperlipidemia. As a part of his recent weight loss he underwent work-up including CT scan of abdomen which showed pancreatic cancer with metastasis to liver and lungs. He was admitted because of hypotension and hypoxia. His CT scan of chest showed tensive right lung pulmonary embolism with right ventricular strain. Plan:   1. Acute pulmonary embolism  Patient has acute right lung extensive pulm embolism with right ventricular strain. Preliminary results of his leg Doppler showed bilateral DVT. Was earlier seen by IR. He was ruled out to be thrombectomy candidate. He is not a candidate for tPA because of his extensive metastasis to liver and lungs. CT scan of head is not available to see if he has any metastasis to his brain. Currently on heparin drip, transition to Eliquis versus Lovenox at discharge. IVC filter placed 9/20/2022  On room air    2. Pancreatic cancer with metastasis to liver and lungs  Supportive care. Continue supplemental oxygen. Follows with [facility]    3.  Leukocytosis  Improving, continue on IV Zosyn  Appreciate assistance from infectious disease  Tagged white blood cell scan pending    4. Hypokalemia  Potassium level 3.4 today, likely due to poor oral intake  Will give 40 mill equivalents oral KCl and repeat electrolytes in the morning      Goals of care needs to be discussed with patient and family considering his advanced stage of disease.     Questions of patient were answered at bedside in detail  Case discussed in detail with RN, RT, and care team  Thank you for involving me in the care of this patient  I will follow with you closely during hospitalization    Time spent more than 30 minutes under patient care with no overlap reviewing results and records, decision making, and answering questions.       Stephen Larose DO  [facility] ([facility])

## 2022-09-20 NOTE — PROGRESS NOTES
Spiritual Care Assessment/Progress Note  [facility]      NAME: Amanuel Garrett      MRN: [id number]  AGE: 71 y.o. SEX: male  Jain Affiliation: Taoist   Language: English     9/20/2022     Total Time (in minutes): 45     Spiritual Assessment begun in [address] through conversation with:         [x]Patient        [x] Family    [x] Friend(s)        Reason for Consult: Initial/Spiritual assessment, patient floor     Spiritual beliefs: (Please include comment if needed)     [x] Identifies with a manav tradition:         [x] Supported by a manav community:            [] Claims no spiritual orientation:           [] Seeking spiritual identity:                [] Adheres to an individual form of spirituality:           [] Not able to assess:                           Identified resources for coping:      [x] Prayer                               [x] Music                  [] Guided Imagery     [x] Family/friends                 [] Pet visits     [x] Devotional reading                         [] Unknown     [] Other:                                               Interventions offered during this visit: (See comments for more details)    Patient Interventions: Affirmation of emotions/emotional suffering, Affirmation of manav, Catharsis/review of pertinent events in supportive environment, Coping skills reviewed/reinforced, Guidance concerning next steps/process to be expected, Initial/Spiritual assessment, patient floor, Iconic (affirming the presence of God/Higher Power), Life review/legacy, Normalization of emotional/spiritual concerns, Prayer (actual), Jain beliefs/image of God discussed     Family/Friend(s):  Affirmation of emotions/emotional suffering, Affirmation of manav, Catharsis/review of pertinent events in supportive environment, Coping skills reviewed/reinforced, Guidance concerning next steps/process to be expected, Iconic (affirming the presence of God/Higher Power), Initial Assessment, Normalization of emotional/spiritual concerns, Prayer (actual), Latter-day beliefs/image of God discussed     Plan of Care:     [] Support spiritual and/or cultural needs    [] Support AMD and/or advance care planning process      [] Support grieving process   [] Coordinate Rites and/or Rituals    [] Coordination with community clergy   [] No spiritual needs identified at this time   [] Detailed Plan of Care below (See Comments)  [] Make referral to Music Therapy  [] Make referral to Pet Therapy     [] Make referral to Addiction services  [] Make referral to [facility]  [] Make referral to Spiritual Care Partner  [] No future visits requested        [x] Contact Spiritual Care for further referrals     Comments: The purpose of the visit was to do a spiritual assessment on the patient. The patient was being visited by his family, however he welcomed the visit. He shared joyfully about his relationship with God, and how he feels comforted by God's presence in his life. He mentioned that his hope was to be healed and live for at least fifteen more years. The patient's family shared how they were there to encourage him and they were going to continue to support him. The patient mentioned that though he knows he is ill, he is believing God, either way, in life or in death, he wants God to use him. He shared that he accepts God's will for his life. The  listened empathically, affirmed supportive presence, encouraged ongoing spiritual reflection, facilitated story-telling, extended prayer, provided the ministry of presence and comfort of spiritual care. [address] [facility] [address].    can be reached by calling the  at [facility]  [phone]

## 2022-09-21 LAB
ALBUMIN SERPL-MCNC: 1.7 G/DL (ref 3.5–5)
ALBUMIN/GLOB SERPL: 0.4 {RATIO} (ref 1.1–2.2)
ALP SERPL-CCNC: 152 U/L (ref 45–117)
ALT SERPL-CCNC: 20 U/L (ref 12–78)
ANION GAP SERPL CALC-SCNC: 7 MMOL/L (ref 5–15)
APTT PPP: 107.2 SEC (ref 21.2–34.1)
APTT PPP: 75.4 SEC (ref 21.2–34.1)
AST SERPL W P-5'-P-CCNC: 28 U/L (ref 15–37)
BACTERIA SPEC CULT: NORMAL
BACTERIA SPEC CULT: NORMAL
BASOPHILS # BLD: 0.1 K/UL (ref 0–0.1)
BASOPHILS NFR BLD: 0 % (ref 0–1)
BILIRUB SERPL-MCNC: 0.8 MG/DL (ref 0.2–1)
BUN SERPL-MCNC: 11 MG/DL (ref 6–20)
BUN/CREAT SERPL: 17 (ref 12–20)
CA-I BLD-MCNC: 8.1 MG/DL (ref 8.5–10.1)
CHLORIDE SERPL-SCNC: 103 MMOL/L (ref 97–108)
CO2 SERPL-SCNC: 26 MMOL/L (ref 21–32)
CREAT SERPL-MCNC: 0.63 MG/DL (ref 0.7–1.3)
CRP SERPL-MCNC: 14.4 MG/DL (ref 0–0.6)
DIFFERENTIAL METHOD BLD: ABNORMAL
EOSINOPHIL # BLD: 0.1 K/UL (ref 0–0.4)
EOSINOPHIL NFR BLD: 1 % (ref 0–7)
ERYTHROCYTE [DISTWIDTH] IN BLOOD BY AUTOMATED COUNT: 13.9 % (ref 11.5–14.5)
GLOBULIN SER CALC-MCNC: 4.4 G/DL (ref 2–4)
GLUCOSE SERPL-MCNC: 107 MG/DL (ref 65–100)
HCT VFR BLD AUTO: 29.3 % (ref 36.6–50.3)
HGB BLD-MCNC: 9.7 G/DL (ref 12.1–17)
IMM GRANULOCYTES # BLD AUTO: 0.1 K/UL (ref 0–0.04)
IMM GRANULOCYTES NFR BLD AUTO: 1 % (ref 0–0.5)
LYMPHOCYTES # BLD: 1.5 K/UL (ref 0.8–3.5)
LYMPHOCYTES NFR BLD: 12 % (ref 12–49)
MAGNESIUM SERPL-MCNC: 2 MG/DL (ref 1.6–2.4)
MCH RBC QN AUTO: 27.9 PG (ref 26–34)
MCHC RBC AUTO-ENTMCNC: 33.1 G/DL (ref 30–36.5)
MCV RBC AUTO: 84.2 FL (ref 80–99)
MONOCYTES # BLD: 0.9 K/UL (ref 0–1)
MONOCYTES NFR BLD: 7 % (ref 5–13)
NEUTS SEG # BLD: 9.9 K/UL (ref 1.8–8)
NEUTS SEG NFR BLD: 79 % (ref 32–75)
NRBC # BLD: 0 K/UL (ref 0–0.01)
NRBC BLD-RTO: 0 PER 100 WBC
PHOSPHATE SERPL-MCNC: 3.2 MG/DL (ref 2.6–4.7)
PLATELET # BLD AUTO: 532 K/UL (ref 150–400)
PMV BLD AUTO: 9.4 FL (ref 8.9–12.9)
POTASSIUM SERPL-SCNC: 3.7 MMOL/L (ref 3.5–5.1)
PROCALCITONIN SERPL-MCNC: 0.55 NG/ML
PROT SERPL-MCNC: 6.1 G/DL (ref 6.4–8.2)
RBC # BLD AUTO: 3.48 M/UL (ref 4.1–5.7)
SODIUM SERPL-SCNC: 136 MMOL/L (ref 136–145)
SPECIAL REQUESTS,SREQ: NORMAL
SPECIAL REQUESTS,SREQ: NORMAL
THERAPEUTIC RANGE,PTTT: ABNORMAL SEC (ref 82–109)
THERAPEUTIC RANGE,PTTT: ABNORMAL SEC (ref 82–109)
WBC # BLD AUTO: 12.6 K/UL (ref 4.1–11.1)

## 2022-09-21 PROCEDURE — 99232 SBSQ HOSP IP/OBS MODERATE 35: CPT | Performed by: INTERNAL MEDICINE

## 2022-09-21 PROCEDURE — 80053 COMPREHEN METABOLIC PANEL: CPT

## 2022-09-21 PROCEDURE — 74011250637 HC RX REV CODE- 250/637: Performed by: HOSPITALIST

## 2022-09-21 PROCEDURE — 85730 THROMBOPLASTIN TIME PARTIAL: CPT

## 2022-09-21 PROCEDURE — 74011250636 HC RX REV CODE- 250/636: Performed by: INTERNAL MEDICINE

## 2022-09-21 PROCEDURE — 84145 PROCALCITONIN (PCT): CPT

## 2022-09-21 PROCEDURE — 74011000250 HC RX REV CODE- 250: Performed by: HOSPITALIST

## 2022-09-21 PROCEDURE — 74011250637 HC RX REV CODE- 250/637: Performed by: INTERNAL MEDICINE

## 2022-09-21 PROCEDURE — 74011000258 HC RX REV CODE- 258: Performed by: HOSPITALIST

## 2022-09-21 PROCEDURE — 86140 C-REACTIVE PROTEIN: CPT

## 2022-09-21 PROCEDURE — 85025 COMPLETE CBC W/AUTO DIFF WBC: CPT

## 2022-09-21 PROCEDURE — 36415 COLL VENOUS BLD VENIPUNCTURE: CPT

## 2022-09-21 PROCEDURE — 65270000029 HC RM PRIVATE

## 2022-09-21 PROCEDURE — 74011250636 HC RX REV CODE- 250/636: Performed by: HOSPITALIST

## 2022-09-21 PROCEDURE — 83735 ASSAY OF MAGNESIUM: CPT

## 2022-09-21 PROCEDURE — 84100 ASSAY OF PHOSPHORUS: CPT

## 2022-09-21 RX ADMIN — SODIUM CHLORIDE, PRESERVATIVE FREE 10 ML: 5 INJECTION INTRAVENOUS at 06:05

## 2022-09-21 RX ADMIN — PIPERACILLIN AND TAZOBACTAM 3.38 G: 3; .375 INJECTION, POWDER, LYOPHILIZED, FOR SOLUTION INTRAVENOUS at 02:10

## 2022-09-21 RX ADMIN — MEGESTROL ACETATE 40 MG: 20 TABLET ORAL at 08:43

## 2022-09-21 RX ADMIN — HEPARIN SODIUM 28 UNITS/KG/HR: 10000 INJECTION, SOLUTION INTRAVENOUS at 02:10

## 2022-09-21 RX ADMIN — PIPERACILLIN AND TAZOBACTAM 3.38 G: 3; .375 INJECTION, POWDER, LYOPHILIZED, FOR SOLUTION INTRAVENOUS at 18:05

## 2022-09-21 RX ADMIN — APIXABAN 10 MG: 5 TABLET, FILM COATED ORAL at 15:49

## 2022-09-21 RX ADMIN — HEPARIN SODIUM 2580 UNITS: 1000 INJECTION, SOLUTION INTRAVENOUS; SUBCUTANEOUS at 04:25

## 2022-09-21 RX ADMIN — PIPERACILLIN AND TAZOBACTAM 3.38 G: 3; .375 INJECTION, POWDER, LYOPHILIZED, FOR SOLUTION INTRAVENOUS at 10:41

## 2022-09-21 RX ADMIN — ASPIRIN 81 MG: 81 TABLET, COATED ORAL at 08:43

## 2022-09-21 RX ADMIN — MIRTAZAPINE 15 MG: 15 TABLET, FILM COATED ORAL at 21:28

## 2022-09-21 RX ADMIN — MEGESTROL ACETATE 40 MG: 20 TABLET ORAL at 21:28

## 2022-09-21 RX ADMIN — PANTOPRAZOLE SODIUM 40 MG: 20 TABLET, DELAYED RELEASE ORAL at 08:43

## 2022-09-21 RX ADMIN — APIXABAN 10 MG: 5 TABLET, FILM COATED ORAL at 21:28

## 2022-09-21 NOTE — PROGRESS NOTES
Subjective:   Daily Progress Note: 9/21/2022 8:31 AM    Subjective: Asymptomatic. Has had on/off diarrhea though. Evaluated by Dr. Hetal Craig: IVC filter today  I discussed with patient and family; updated on plans.  ------------------    9/18/22: Feels well. No CP. Discussed with patient and family. Tolerating Hep gtt. Switch to Eliquis tomorrow. O2 Sat 97% RA.  ----------------      9/17/22: Feels well; updated family, answered questions at bedside. Evaluated by Pulm, Card, IR. On IV Hep. Tolerating. Follows with [facility] for Pancreatic CA mgmt.  ------------------------------    9/16/22: Patient having back pain not controlled with tylenol. Patient does not use oxygen at home. Patient denies hemoptysis, wheezing, cough, or chest pain. Addendum:    CTA of the chest with extensive right-sided pulmonary embolism with borderline deviation of the interventricular septum and metastatic disease to lungs and liver. IR, vascular, and pulmonology consulted. IR, Dr. Mariela Almonte, recommending heparin drip with no acute intervention at this time. Vascular unavailable. Duplex pending.     Current Facility-Administered Medications   Medication Dose Route Frequency    apixaban (ELIQUIS) tablet 10 mg  10 mg Oral Q12H    loperamide (IMODIUM) capsule 2 mg  2 mg Oral QID PRN    hydrALAZINE (APRESOLINE) 20 mg/mL injection 10 mg  10 mg IntraVENous QID PRN    oxyCODONE-acetaminophen (PERCOCET) 5-325 mg per tablet 1 Tablet  1 Tablet Oral Q4H PRN    traMADoL (ULTRAM) tablet 50 mg  50 mg Oral Q6H PRN    aspirin delayed-release tablet 81 mg  81 mg Oral DAILY    megestroL (MEGACE) tablet 40 mg  40 mg Oral BID    oxyCODONE IR (ROXICODONE) tablet 5 mg  5 mg Oral Q6H PRN    mirtazapine (REMERON) tablet 15 mg  15 mg Oral QHS    pantoprazole (PROTONIX) tablet 40 mg  40 mg Oral ACB    sodium chloride (NS) flush 5-40 mL  5-40 mL IntraVENous PRN    acetaminophen (TYLENOL) tablet 650 mg  650 mg Oral Q6H PRN    Or    acetaminophen (TYLENOL) suppository 650 mg 650 mg Rectal Q6H PRN    ondansetron (ZOFRAN ODT) tablet 4 mg  4 mg Oral Q6H PRN    Or    ondansetron (ZOFRAN) injection 4 mg  4 mg IntraVENous Q6H PRN    piperacillin-tazobactam (ZOSYN) 3.375 g in 0.9% sodium chloride (MBP/ADV) 100 mL MBP  3.375 g IntraVENous Q8H        Review of Systems: No SOB, CP, Nausea, HA. Back pain, intermittent diarrhea. Objective:     Visit Vitals  /63 (BP Patient Position: At rest;Lying)   Pulse 94   Temp 98.6 °F (37 °C)   Resp 18   Ht 5' 6\" (1.676 m)   Wt 65.3 kg (144 lb)   SpO2 90%   BMI 23.24 kg/m²    O2 Flow Rate (L/min): 2 l/min O2 Device: None (Room air)    Temp (24hrs), Av.4 °F (36.9 °C), Min:98.1 °F (36.7 °C), Max:99.1 °F (37.3 °C)      701 -  190  In: 2845.7 [P.O.:700; I.V.:2145.7]  Out: 450 [Urine:450]  1901 -  0700  In: -   Out: 800 [Urine:800]    NM INFLAM PROC WH BODY   Final Result   Negative Whole Body Nuclear WBC Scan. DUPLEX LOWER EXT VENOUS BILAT   Final Result      CTA CHEST W OR W WO CONT   Final Result      1. Extensive right-sided acute pulmonary emboli. Borderline deviation of the   interventricular septum suggesting right heart strain   2. Metastatic disease to lungs and liver      Findings were phoned to the floor at 1531 hours to Jennifer the . A   perfect serve text was sent to [id number]:    Physical Exam  Vitals and nursing note reviewed. Constitutional:       Comments: Thinly built   HENT:      Head: Normocephalic and atraumatic. Cardiovascular:      Rate and Rhythm: Normal rate and regular rhythm. Pulmonary:      Effort: No respiratory distress. Breath sounds: No wheezing. Abdominal:      General: Bowel sounds are normal. There is no distension. Palpations: Abdomen is soft. Tenderness: There is no abdominal tenderness. Musculoskeletal:      Right lower leg: No edema. Left lower leg: No edema.    Neurological:      Mental Status: He is alert and oriented to person, place, and time. Psychiatric:         Mood and Affect: Mood normal.        Data Review    Recent Results (from the past 24 hour(s))   METABOLIC PANEL, COMPREHENSIVE    Collection Time: 09/21/22  2:27 AM   Result Value Ref Range    Sodium 136 136 - 145 mmol/L    Potassium 3.7 3.5 - 5.1 mmol/L    Chloride 103 97 - 108 mmol/L    CO2 26 21 - 32 mmol/L    Anion gap 7 5 - 15 mmol/L    Glucose 107 (H) 65 - 100 mg/dL    BUN 11 6 - 20 mg/dL    Creatinine 0.63 (L) 0.70 - 1.30 mg/dL    BUN/Creatinine ratio 17 12 - 20      GFR est AA >60 >60 ml/min/1.73m2    GFR est non-AA >60 >60 ml/min/1.73m2    Calcium 8.1 (L) 8.5 - 10.1 mg/dL    Bilirubin, total 0.8 0.2 - 1.0 mg/dL    AST (SGOT) 28 15 - 37 U/L    ALT (SGPT) 20 12 - 78 U/L    Alk. phosphatase 152 (H) 45 - 117 U/L    Protein, total 6.1 (L) 6.4 - 8.2 g/dL    Albumin 1.7 (L) 3.5 - 5.0 g/dL    Globulin 4.4 (H) 2.0 - 4.0 g/dL    A-G Ratio 0.4 (L) 1.1 - 2.2     CBC WITH AUTOMATED DIFF    Collection Time: 09/21/22  2:27 AM   Result Value Ref Range    WBC 12.6 (H) 4.1 - 11.1 K/uL    RBC 3.48 (L) 4.10 - 5.70 M/uL    HGB 9.7 (L) 12.1 - 17.0 g/dL    HCT 29.3 (L) 36.6 - 50.3 %    MCV 84.2 80.0 - 99.0 FL    MCH 27.9 26.0 - 34.0 PG    MCHC 33.1 30.0 - 36.5 g/dL    RDW 13.9 11.5 - 14.5 %    PLATELET 474 (H) 395 - 400 K/uL    MPV 9.4 8.9 - 12.9 FL    NRBC 0.0 0.0  WBC    ABSOLUTE NRBC 0.00 0.00 - 0.01 K/uL    NEUTROPHILS 79 (H) 32 - 75 %    LYMPHOCYTES 12 12 - 49 %    MONOCYTES 7 5 - 13 %    EOSINOPHILS 1 0 - 7 %    BASOPHILS 0 0 - 1 %    IMMATURE GRANULOCYTES 1 (H) 0 - 0.5 %    ABS. NEUTROPHILS 9.9 (H) 1.8 - 8.0 K/UL    ABS. LYMPHOCYTES 1.5 0.8 - 3.5 K/UL    ABS. MONOCYTES 0.9 0.0 - 1.0 K/UL    ABS. EOSINOPHILS 0.1 0.0 - 0.4 K/UL    ABS. BASOPHILS 0.1 0.0 - 0.1 K/UL    ABS. IMM.  GRANS. 0.1 (H) 0.00 - 0.04 K/UL    DF AUTOMATED     MAGNESIUM    Collection Time: 09/21/22  2:27 AM   Result Value Ref Range    Magnesium 2.0 1.6 - 2.4 mg/dL   PHOSPHORUS    Collection Time: 09/21/22  2:27 AM   Result Value Ref Range    Phosphorus 3.2 2.6 - 4.7 mg/dL   PTT    Collection Time: 09/21/22  2:27 AM   Result Value Ref Range    aPTT 75.4 (H) 21.2 - 34.1 sec    aPTT, therapeutic range   82 - 109 sec   C REACTIVE PROTEIN, QT    Collection Time: 09/21/22  2:27 AM   Result Value Ref Range    C-Reactive protein 14.40 (H) 0.00 - 0.60 mg/dL   PROCALCITONIN    Collection Time: 09/21/22  2:27 AM   Result Value Ref Range    Procalcitonin 0.55 (H) 0 ng/mL   PTT    Collection Time: 09/21/22 10:12 AM   Result Value Ref Range    aPTT 107.2 (H) 21.2 - 34.1 sec    aPTT, therapeutic range   82 - 109 sec      Duplex LE 9/16/22:  Acute thrombus present in the right popliteal vein. Acute thrombus present in the right soleal vein. Acute thrombus present in the right posterior tibial vein. Acute thrombus present in the right peroneal vein. Acute occlusive thrombus present in the left common femoral vein. Acute occlusive thrombus present in the left proximal femoral vein. Acute occlusive thrombus present in the left mid femoral vein. Acute occlusive thrombus present in the left distal femoral vein. Acute occlusive thrombus present in the left popliteal vein. Acute occlusive thrombus present in the left gastrocnemius vein. Acute thrombus present in the left soleal vein. Acute thrombus present in the left posterior tibial vein. Acute thrombus present in the left peroneal vein. Acute thrombus present in the right gastrocnemius vein. Assessment/Plan:     Hospital Course:    Marissa Forman is a 71year old male with a PMH of metastatic pancreatic cancer, diabetes, and hypertension who presents with syncope via EMS. EMS found sinus tachycardia with hypotension and started on IV fluids. In ED hypotensive. Despite given 3 L of IV fluids in total patient remained hypotensive and was started on Levophed.   Initial labs significant for WBC of 14.6, hemoglobin of 9.1, creatinine 1.77, lactic of 2.4, troponin of 1990, BUN of 22. Urinalysis with bacteria, amorphous crystals, and spermatozoa. CXR with patchy diffuse mild lung infiltrates and interstitial prominence. CT of the abdomen showed progression of hepatic metastatic disease, new splenic infarct versus splenic masses, CBD stent is in good position. CT of the chest showed grossly unchanged pulmonary nodules and nodular opacities without fibrosis. Patient was admitted for further management. Overnight levophed was weaned and BP remained stable off of Levophed. Patient was started on heparin drip and Zosyn. Cardiology, ID, and oncology consulted. CTA of the chest with extensive right-sided pulmonary embolism with borderline deviation of the interventricular septum and metastatic disease to lungs and liver. IR, vascular, and pulmonology consulted. IR, Dr. Alfred Aden, who is recommending a heparin drip with no acute intervention at this time. Vascular unavailable. Duplex pending.  ECHO showed LVEF of 55-60% with moderately reduced systolic function, global hypokinesis, moderate pericardial effusion and dilated LA/RA.  ------------------------------    Pulmonary embolism w/ RV Strain & Bilat LE DVT  CTA of the chest with extensive right-sided pulmonary embolism with borderline deviation of the interventricular septum and metastatic disease to lungs and liver  Was discussed with IR, Dr. Alfred Aden, recommending a heparin drip with no acute intervention at this time  IR, vascular, and pulmonology consulted  Cont Hep gtt --> Eliquis in 1-2 days  IVC Filter per Dr. Elliott Hands tomorrow    Leukocytosis  most likely reactive vs infectious  Abnormal UA + bacteria, amorphous crystals, and spermatoza  CXR with patchy diffuse mild lung infiltrates and interstitial prominence  CT of the chest showed grossly unchanged pulmonary nodules and nodular opacities without fibrosis  9/15 blood: NGTD, prelim  Continue on Zosyn   ID consulted/Jennifer  WBC Scan pending    Demand Ischemia with Troponin Elevation d/t PE  Cardiology on consult    Metastatic pancreatic cancer with obstructive jaundice   S/p stent (mets to lung and liver with a splenic infarct versus mets)  Port-A-Cath soon for chemotherapy, established at [facility]  Pain control  Oncology consulted    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

## 2022-09-21 NOTE — PROGRESS NOTES
Pulmonary & Critical Care Progress Note    Subjective:     Patient seen and examined in his room on the floor this afternoon, no acute events overnight. Case discussed in detail at the bedside with the patient and his family. Saturating well on room air, denies any chest pain/shortness of breath. CBC/BMP stable, CRP interestingly going up but his tagged white blood cell scan was negative. Still on IV Zosyn, defer to infectious disease for further recommendations. Okay to discontinue heparin drip once he gets his dose of Eliquis today. From a pulmonary standpoint, the patient is cleared for discharge. Awaiting PT recommendations. No Known Allergies     Review of Systems: All 10 systems were reviewed. Positive pertinent findings are mentioned above. Rest of the examination review essentially unremarkable    Objective:   Blood pressure 104/63, pulse 94, temperature 98.6 °F (37 °C), resp. rate 18, height 5' 6\" (1.676 m), weight 65.3 kg (144 lb), SpO2 90 %. Temp (24hrs), Av.4 °F (36.9 °C), Min:98.1 °F (36.7 °C), Max:99.1 °F (37.3 °C)    NM INFLAM PROC WH BODY   Final Result   Negative Whole Body Nuclear WBC Scan. DUPLEX LOWER EXT VENOUS BILAT   Final Result      CTA CHEST W OR W WO CONT   Final Result      1. Extensive right-sided acute pulmonary emboli. Borderline deviation of the   interventricular septum suggesting right heart strain   2. Metastatic disease to lungs and liver      Findings were phoned to the floor at 1531 hours to Jennifer the .  A   perfect serve text was sent to [facility]         Data Review: CBC:   Recent Labs     22  0125 22  0734   WBC 12.6* 12.1* 13.3*   RBC 3.48* 3.58* 3.84*   HGB 9.7* 10.0* 10.6*   HCT 29.3* 30.2* 32.4*   * 438* 431*   GRANS 79* 80* 81*   LYMPH 12 10* 9*   EOS 1 1 1       Liver Enzymes:   Recent Labs     22   TP 6.1*   ALB 1.7*   *       ABGs: No results for input(s): PH, PCO2, PO2, HCO3 in the last 72 hours. Current Facility-Administered Medications   Medication Dose Route Frequency    apixaban (ELIQUIS) tablet 10 mg  10 mg Oral Q12H    loperamide (IMODIUM) capsule 2 mg  2 mg Oral QID PRN    hydrALAZINE (APRESOLINE) 20 mg/mL injection 10 mg  10 mg IntraVENous QID PRN    oxyCODONE-acetaminophen (PERCOCET) 5-325 mg per tablet 1 Tablet  1 Tablet Oral Q4H PRN    traMADoL (ULTRAM) tablet 50 mg  50 mg Oral Q6H PRN    aspirin delayed-release tablet 81 mg  81 mg Oral DAILY    megestroL (MEGACE) tablet 40 mg  40 mg Oral BID    oxyCODONE IR (ROXICODONE) tablet 5 mg  5 mg Oral Q6H PRN    mirtazapine (REMERON) tablet 15 mg  15 mg Oral QHS    pantoprazole (PROTONIX) tablet 40 mg  40 mg Oral ACB    sodium chloride (NS) flush 5-40 mL  5-40 mL IntraVENous PRN    acetaminophen (TYLENOL) tablet 650 mg  650 mg Oral Q6H PRN    Or    acetaminophen (TYLENOL) suppository 650 mg  650 mg Rectal Q6H PRN    ondansetron (ZOFRAN ODT) tablet 4 mg  4 mg Oral Q6H PRN    Or    ondansetron (ZOFRAN) injection 4 mg  4 mg IntraVENous Q6H PRN    piperacillin-tazobactam (ZOSYN) 3.375 g in 0.9% sodium chloride (MBP/ADV) 100 mL MBP  3.375 g IntraVENous Q8H        Exam:      This is an elderly male who is on nasal cannula oxygen. Currently is not in any distress. He is alert and oriented x3. Head normocephalic and atraumatic, pupils are round responsive to light sclera icteric and conjunctive are pink. Neck is supple. No cervical lymphadenopathy. Thyroid not enlarged  Chest: Fair entry of air bilaterally. Few basal rhonchi audible at right lung base. No wheezing was appreciated  Heart: S1-S2 normal, and is tachycardic  Abdomen: Soft, nontender, no visceromegaly  Extremities: No edema, sinus or clubbing  Neuro: No focal motor deficit. Impression: This is an elderly male who has known history of hypertension and hyperlipidemia.   As a part of his recent weight loss he underwent work-up including CT scan of abdomen which showed pancreatic cancer with metastasis to liver and lungs. He was admitted because of hypotension and hypoxia. His CT scan of chest showed tensive right lung pulmonary embolism with right ventricular strain. Plan:   1. Acute pulmonary embolism  Patient has acute right lung extensive pulm embolism with right ventricular strain. Preliminary results of his leg Doppler showed bilateral DVT. Was earlier seen by IR. He was ruled out to be thrombectomy candidate. He is not a candidate for tPA because of his extensive metastasis to liver and lungs. CT scan of head is not available to see if he has any metastasis to his brain. Currently on heparin drip, transition to Eliquis at discharge  IVC filter placed 9/20/2022  On room air    2. Pancreatic cancer with metastasis to liver and lungs  Supportive care. Continue supplemental oxygen. Follows with [facility]    3.  Leukocytosis  Improving, continue on IV Zosyn  Appreciate assistance from infectious disease  Tagged white blood cell scan negative    4. Hypokalemia  Potassium level normalized today  Status post oral KCl yesterday      Goals of care needs to be discussed with patient and family considering his advanced stage of disease. Questions of patient were answered at bedside in detail  Case discussed in detail with RN, RT, and care team  Thank you for involving me in the care of this patient  I will follow with you closely during hospitalization    Time spent more than 30 minutes under patient care with no overlap reviewing results and records, decision making, and answering questions.       Mallika Valadez DO  [facility] ([facility])

## 2022-09-21 NOTE — PROGRESS NOTES
Problem: Falls - Risk of  Goal: *Absence of Falls  Description: Document Crow Negron Fall Risk and appropriate interventions in the flowsheet. Outcome: Progressing Towards Goal  Note: Fall Risk Interventions:  Mobility Interventions: Bed/chair exit alarm, Patient to call before getting OOB         Medication Interventions: Bed/chair exit alarm    Elimination Interventions: Bed/chair exit alarm, Call light in reach              Problem: Patient Education: Go to Patient Education Activity  Goal: Patient/Family Education  Outcome: Progressing Towards Goal     Problem: Pressure Injury - Risk of  Goal: *Prevention of pressure injury  Description: Document Erlin Scale and appropriate interventions in the flowsheet. Outcome: Progressing Towards Goal  Note: Pressure Injury Interventions:  Sensory Interventions: Assess changes in LOC, Keep linens dry and wrinkle-free, Float heels, Minimize linen layers, Turn and reposition approx.  every two hours (pillows and wedges if needed)    Moisture Interventions: Absorbent underpads, Internal/External urinary devices, Limit adult briefs, Minimize layers    Activity Interventions: PT/OT evaluation    Mobility Interventions: HOB 30 degrees or less, Float heels    Nutrition Interventions: Document food/fluid/supplement intake, Offer support with meals,snacks and hydration    Friction and Shear Interventions: Apply protective barrier, creams and emollients, Minimize layers, HOB 30 degrees or less                Problem: Patient Education: Go to Patient Education Activity  Goal: Patient/Family Education  Outcome: Progressing Towards Goal

## 2022-09-21 NOTE — PROGRESS NOTES
Chart reviewed, DCP remains for patient to d/c to home with [facility], [address] [place] [address], updates sent via [employer]. CM continues to follow and monitor for needs.

## 2022-09-21 NOTE — PROGRESS NOTES
Progress Note    Patient: Donita Peña MRN: [id number]  SSN: [id number]    YOB: 1950  Age: 71 y.o. Sex: male      Admit Date: 9/15/2022    LOS: 6 days     Subjective:   Patient with metastatic pancreatic cancer, followed for suspected sepsis with possible UTI but no urine culture identified. Patient remains afebrile with persistent leukocytosis and elevated CRP. Remains on IV Zosyn. Ceretec scan was negative. Patient resting comfortably with no complaints, inquiring about going home. Objective:     Vitals:    09/20/22 1938 09/20/22 2254 09/21/22 0404 09/21/22 0759   BP: 104/66 102/61 120/77 108/65   Pulse: (!) 111 98 98 91   Resp: 20 18 18 18   Temp: 98.3 °F (36.8 °C) 98.3 °F (36.8 °C) 98.1 °F (36.7 °C) 98.2 °F (36.8 °C)   SpO2: 92% 92% 94% 93%   Weight:       Height:            Intake and Output:  Current Shift: No intake/output data recorded. Last three shifts: 09/19 1901 - 09/21 0700  In: -   Out: 800 [Urine:800]    Physical Exam:   Vitals and nursing note reviewed. Constitutional:       General: He is not in acute distress. Appearance: He is ill-appearing. Comments: Cachectic      HENT: unremarkable  Cardiovascular:      Rate and Rhythm: Normal rate and regular rhythm. Heart sounds: No murmur heard. Pulmonary:      Effort: Pulmonary effort is normal.      Breath sounds: Normal breath sounds. Abdominal:      General: Bowel sounds are normal. There is no distension. Palpations: Abdomen is soft. Tenderness: There is no abdominal tenderness. Genitourinary:     Comments: External urinary device  Musculoskeletal:      Right lower leg: No edema. Left lower leg: No edema. Neurological:      General: No focal deficit present. Mental Status: He is alert and oriented to person, place, and time.      Lab/Data Review:     WBC 12,600    CRP 14.40 <13.70 <16.60 <20.50 <16.30 <16.90  Procal 0.55 <0.82 <0.98 <1.10 <1.54    Blood cultures (9/15) No growth FINAL   Blood cultures (9/15) No growth FINAL     Ceretec scan (9/19) Negative Whole Body Nuclear WBC Scan. Assessment:     Active Problems:    Carcinoma of head of pancreas ([facility] [address] (9/15/2022)      Hypotension (9/15/2022)      NSTEMI (non-ST elevated myocardial infarction) ([facility] [address] (9/15/2022)  Presumptive sepsis with leukocytosis, elevated CRP and procal, etiology unclear, Ceretec scan negative, on IV Zosyn  Metastatic pancreatic cancer to lung and liver     Comment:  WBC, CRP increasing today while procal decreasing. Ceretec scan negative which provides some reassurance that there are no major foci of infection, including abscesses, but it does not rule out infection.      Plan:     Would favor continuing IV Zosyn empirically, however, if discharged, would transition to Augmentin 875 mg po BID for 2 weeks  Cleared for discharge from ID standpoint      Signed By: Jasmin Earl MD     September 21, 2022

## 2022-09-21 NOTE — PROGRESS NOTES
Primary Nurse Amita Pino RN and Winnie Beatty RN performed a dual skin assessment on this patient Impairment noted- see wound doc flow sheet  Erlin score is 17.

## 2022-09-22 VITALS
RESPIRATION RATE: 18 BRPM | TEMPERATURE: 98.1 F | SYSTOLIC BLOOD PRESSURE: 127 MMHG | DIASTOLIC BLOOD PRESSURE: 73 MMHG | WEIGHT: 144 LBS | OXYGEN SATURATION: 92 % | BODY MASS INDEX: 23.14 KG/M2 | HEIGHT: 66 IN | HEART RATE: 94 BPM

## 2022-09-22 LAB
ALBUMIN SERPL-MCNC: 1.7 G/DL (ref 3.5–5)
ALBUMIN/GLOB SERPL: 0.4 {RATIO} (ref 1.1–2.2)
ALP SERPL-CCNC: 153 U/L (ref 45–117)
ALT SERPL-CCNC: 18 U/L (ref 12–78)
ANION GAP SERPL CALC-SCNC: 8 MMOL/L (ref 5–15)
AST SERPL W P-5'-P-CCNC: 30 U/L (ref 15–37)
BASOPHILS # BLD: 0.1 K/UL (ref 0–0.1)
BASOPHILS NFR BLD: 0 % (ref 0–1)
BILIRUB SERPL-MCNC: 0.8 MG/DL (ref 0.2–1)
BUN SERPL-MCNC: 11 MG/DL (ref 6–20)
BUN/CREAT SERPL: 17 (ref 12–20)
CA-I BLD-MCNC: 8.4 MG/DL (ref 8.5–10.1)
CHLORIDE SERPL-SCNC: 105 MMOL/L (ref 97–108)
CO2 SERPL-SCNC: 24 MMOL/L (ref 21–32)
CREAT SERPL-MCNC: 0.63 MG/DL (ref 0.7–1.3)
CRP SERPL-MCNC: 12.8 MG/DL (ref 0–0.6)
DIFFERENTIAL METHOD BLD: ABNORMAL
EOSINOPHIL # BLD: 0.1 K/UL (ref 0–0.4)
EOSINOPHIL NFR BLD: 1 % (ref 0–7)
ERYTHROCYTE [DISTWIDTH] IN BLOOD BY AUTOMATED COUNT: 14.4 % (ref 11.5–14.5)
GLOBULIN SER CALC-MCNC: 4.6 G/DL (ref 2–4)
GLUCOSE SERPL-MCNC: 90 MG/DL (ref 65–100)
HCT VFR BLD AUTO: 32.7 % (ref 36.6–50.3)
HGB BLD-MCNC: 10.6 G/DL (ref 12.1–17)
IMM GRANULOCYTES # BLD AUTO: 0.2 K/UL (ref 0–0.04)
IMM GRANULOCYTES NFR BLD AUTO: 1 % (ref 0–0.5)
LYMPHOCYTES # BLD: 1.2 K/UL (ref 0.8–3.5)
LYMPHOCYTES NFR BLD: 11 % (ref 12–49)
MAGNESIUM SERPL-MCNC: 2 MG/DL (ref 1.6–2.4)
MCH RBC QN AUTO: 28.4 PG (ref 26–34)
MCHC RBC AUTO-ENTMCNC: 32.4 G/DL (ref 30–36.5)
MCV RBC AUTO: 87.7 FL (ref 80–99)
MONOCYTES # BLD: 0.8 K/UL (ref 0–1)
MONOCYTES NFR BLD: 8 % (ref 5–13)
NEUTS SEG # BLD: 8.8 K/UL (ref 1.8–8)
NEUTS SEG NFR BLD: 79 % (ref 32–75)
NRBC # BLD: 0 K/UL (ref 0–0.01)
NRBC BLD-RTO: 0 PER 100 WBC
PHOSPHATE SERPL-MCNC: 3.3 MG/DL (ref 2.6–4.7)
PLATELET # BLD AUTO: 511 K/UL (ref 150–400)
PMV BLD AUTO: 8.8 FL (ref 8.9–12.9)
POTASSIUM SERPL-SCNC: 3.9 MMOL/L (ref 3.5–5.1)
PROCALCITONIN SERPL-MCNC: 0.39 NG/ML
PROT SERPL-MCNC: 6.3 G/DL (ref 6.4–8.2)
RBC # BLD AUTO: 3.73 M/UL (ref 4.1–5.7)
SODIUM SERPL-SCNC: 137 MMOL/L (ref 136–145)
WBC # BLD AUTO: 11.1 K/UL (ref 4.1–11.1)

## 2022-09-22 PROCEDURE — 97530 THERAPEUTIC ACTIVITIES: CPT

## 2022-09-22 PROCEDURE — 99232 SBSQ HOSP IP/OBS MODERATE 35: CPT | Performed by: INTERNAL MEDICINE

## 2022-09-22 PROCEDURE — 80053 COMPREHEN METABOLIC PANEL: CPT

## 2022-09-22 PROCEDURE — 74011250637 HC RX REV CODE- 250/637: Performed by: INTERNAL MEDICINE

## 2022-09-22 PROCEDURE — 36415 COLL VENOUS BLD VENIPUNCTURE: CPT

## 2022-09-22 PROCEDURE — 86140 C-REACTIVE PROTEIN: CPT

## 2022-09-22 PROCEDURE — 97161 PT EVAL LOW COMPLEX 20 MIN: CPT

## 2022-09-22 PROCEDURE — 84145 PROCALCITONIN (PCT): CPT

## 2022-09-22 PROCEDURE — 83735 ASSAY OF MAGNESIUM: CPT

## 2022-09-22 PROCEDURE — 74011250636 HC RX REV CODE- 250/636: Performed by: HOSPITALIST

## 2022-09-22 PROCEDURE — 94761 N-INVAS EAR/PLS OXIMETRY MLT: CPT

## 2022-09-22 PROCEDURE — 74011000258 HC RX REV CODE- 258: Performed by: HOSPITALIST

## 2022-09-22 PROCEDURE — 74011250637 HC RX REV CODE- 250/637: Performed by: HOSPITALIST

## 2022-09-22 PROCEDURE — 84100 ASSAY OF PHOSPHORUS: CPT

## 2022-09-22 PROCEDURE — 85025 COMPLETE CBC W/AUTO DIFF WBC: CPT

## 2022-09-22 RX ORDER — AMOXICILLIN AND CLAVULANATE POTASSIUM 875; 125 MG/1; MG/1
1 TABLET, FILM COATED ORAL EVERY 12 HOURS
Qty: 14 TABLET | Refills: 0 | Status: SHIPPED | OUTPATIENT
Start: 2022-09-22 | End: 2022-09-29

## 2022-09-22 RX ADMIN — PIPERACILLIN AND TAZOBACTAM 3.38 G: 3; .375 INJECTION, POWDER, LYOPHILIZED, FOR SOLUTION INTRAVENOUS at 11:13

## 2022-09-22 RX ADMIN — PANTOPRAZOLE SODIUM 40 MG: 20 TABLET, DELAYED RELEASE ORAL at 08:14

## 2022-09-22 RX ADMIN — MEGESTROL ACETATE 40 MG: 20 TABLET ORAL at 08:14

## 2022-09-22 RX ADMIN — PIPERACILLIN AND TAZOBACTAM 3.38 G: 3; .375 INJECTION, POWDER, LYOPHILIZED, FOR SOLUTION INTRAVENOUS at 03:36

## 2022-09-22 RX ADMIN — APIXABAN 10 MG: 5 TABLET, FILM COATED ORAL at 08:14

## 2022-09-22 RX ADMIN — ASPIRIN 81 MG: 81 TABLET, COATED ORAL at 08:14

## 2022-09-22 NOTE — DISCHARGE SUMMARY
Physician Discharge Summary     Patient ID:    Cinthya Woodard  [id number]  75 y.o.  1950    Admit date: 9/15/2022    Discharge date : 9/22/2022    Chronic Diagnoses:    Problem List as of 9/22/2022 Date Reviewed: 7/11/2022            Codes Class Noted - Resolved    Carcinoma of head of pancreas ([facility] ICD-10-CM: C25.0  ICD-9-CM: 157.0  9/15/2022 - Present        Hypotension ICD-10-CM: I95.9  ICD-9-CM: 458.9  9/15/2022 - Present        NSTEMI (non-ST elevated myocardial infarction) ([facility] ICD-10-CM: I21.4  ICD-9-CM: 410.70  9/15/2022 - Present        Hypertension ICD-10-CM: I10  ICD-9-CM: 401.9  4/20/2022 - Present        Weight loss ICD-10-CM: R63.4  ICD-9-CM: 783.21  4/19/2022 - Present        Diverticulosis ICD-10-CM: K57.90  ICD-9-CM: 562.10  4/19/2022 - Present        Type II diabetes mellitus ([facility] ICD-10-CM: E11.9  ICD-9-CM: 250.00  4/19/2022 - Present        Hypercholesterolemia ICD-10-CM: E78.00  ICD-9-CM: 272.0  Unknown - Present       22    Final Diagnoses:   NSTEMI (non-ST elevated myocardial infarction) ([facility] [I21.4]  Hypotension [I95.9]  Carcinoma of head of pancreas ([facility] [C25.0]  Acute Pulmonary embolism with right heart strain  Sepsis   Metastatic pancreatic cancer with obstructive jaundice  Anemia of malignancy  Adult failure to thrive due to malignancy    Reason for Hospitalization:    syncope    Hospital Course:     71year old male with a PMH of metastatic pancreatic cancer, diabetes, and hypertension who presents with syncope via EMS. EMS found sinus tachycardia with hypotension and started on IV fluids. In ED hypotensive. Despite given 3 L of IV fluids in total patient remained hypotensive and was started on Levophed. Initial labs significant for WBC of 14.6, hemoglobin of 9.1, creatinine 1.77, lactic of 2.4, troponin of 1990, BUN of 22. Urinalysis with bacteria, amorphous crystals, and spermatozoa.   CXR with patchy diffuse mild lung infiltrates and interstitial prominence. CT of the abdomen showed progression of hepatic metastatic disease, new splenic infarct versus splenic masses, CBD stent is in good position. CT of the chest showed grossly unchanged pulmonary nodules and nodular opacities without fibrosis. Patient was admitted for further management. Overnight levophed was weaned and BP remained stable off of Levophed. Patient was started on heparin drip and Zosyn. Cardiology, ID, and oncology consulted. CTA of the chest with extensive right-sided pulmonary embolism with borderline deviation of the interventricular septum and metastatic disease to lungs and liver. IR, vascular, and pulmonology consulted. IR, Dr. Wilton Avilez, who is recommending a heparin drip with no acute intervention at this time. Vascular unavailable. Duplex pending. ECHO showed LVEF of 55-60% with moderately reduced systolic function, global hypokinesis, moderate pericardial effusion and dilated LA/RA. Transitioned to oral apixaban 10 mg BID x 7 days and will contine 5mg BID thereafter. Overall stable for discharge to home with OP follow up at [facility]. Overall prognosis remain poor            Discharge Medications:   Current Discharge Medication List        START taking these medications    Details   !! apixaban (ELIQUIS) 5 mg tablet Take 2 Tablets by mouth every twelve (12) hours for 7 days. Qty: 28 Tablet, Refills: 0  Start date: 9/22/2022, End date: 9/29/2022      amoxicillin-clavulanate (Augmentin) 875-125 mg per tablet Take 1 Tablet by mouth every twelve (12) hours for 7 days. Qty: 14 Tablet, Refills: 0  Start date: 9/22/2022, End date: 9/29/2022      !! apixaban (ELIQUIS) 5 mg tablet Take 1 Tablet by mouth two (2) times a day for 30 days. Qty: 60 Tablet, Refills: 2  Start date: 9/22/2022, End date: 10/22/2022    Comments: Begin 5 mg dose after completing 10 mg dose for 7 days       ! ! - Potential duplicate medications found. Please discuss with provider.         CONTINUE these medications which have NOT CHANGED    Details   oxyCODONE IR (ROXICODONE) 5 mg immediate release tablet Take 5 mg by mouth every six (6) hours as needed for Severe Pain.      mirtazapine (REMERON) 15 mg tablet Take 15 mg by mouth nightly. omeprazole (PRILOSEC) 40 mg capsule Take 40 mg by mouth Daily (before breakfast). valsartan (DIOVAN) 320 mg tablet Take 320 mg by mouth daily. tamsulosin (FLOMAX) 0.4 mg capsule Take 0.4 mg by mouth daily. aspirin delayed-release 81 mg tablet Take 81 mg by mouth daily. amLODIPine (NORVASC) 10 mg tablet Take 10 mg by mouth daily. rosuvastatin (CRESTOR) 40 mg tablet Take 40 mg by mouth daily. STOP taking these medications       megestroL (MEGACE) 40 mg tablet Comments:   Reason for Stopping:         pantoprazole (PROTONIX) 40 mg tablet Comments:   Reason for Stopping:         famotidine (PEPCID) 40 mg tablet Comments:   Reason for Stopping: Follow up Care:    1. Grazyna Mcqueen MD in 1-2 weeks. Please call to set up an appointment shortly after discharge. Diet:  Cardiac Diet    Disposition:  Home. Advanced Directive:   FULL    DNR      Discharge Exam:  Visit Vitals  /78 (BP Patient Position: At rest;Lying)   Pulse 93   Temp 97.5 °F (36.4 °C)   Resp 18   Ht 5' 6\" (1.676 m)   Wt 65.3 kg (144 lb)   SpO2 93%   BMI 23.24 kg/m²    O2 Flow Rate (L/min): 2 l/min O2 Device: None (Room air)    Temp (24hrs), Av.2 °F (36.8 °C), Min:97.5 °F (36.4 °C), Max:99.1 °F (37.3 °C)    No intake/output data recorded.  1901 -  0700  In: 2845.7 [P.O.:700; I.V.:2145.7]  Out: 450 [Urine:450]    General:  Alert, cooperative, no distress, appears stated age. Lungs:   Clear to auscultation bilaterally. Chest wall:  No tenderness or deformity. Heart:  Regular rate and rhythm, S1, S2 normal, no murmur, click, rub or gallop. Abdomen:   Soft, non-tender. Bowel sounds normal. No masses,  No organomegaly.    Extremities: Extremities normal, atraumatic, no cyanosis or edema. Pulses: 2+ and symmetric all extremities. Skin: Skin color, texture, turgor normal. No rashes or lesions   Neurologic: CNII-XII intact. No gross sensory or motor deficits         CONSULTATIONS: Pulmonary/Intensive care    Significant Diagnostic Studies:   9/15/2022: BUN 22 mg/dL (H; Ref range: 6 - 20 mg/dL); Calcium 8.9 mg/dL (Ref range: 8.5 - 10.1 mg/dL); CO2 26 mmol/L (Ref range: 21 - 32 mmol/L); Creatinine 1.77 mg/dL (H; Ref range: 0.70 - 1.30 mg/dL); Glucose 137 mg/dL (H; Ref range: 65 - 100 mg/dL); HCT 33.7 % (L; Ref range: 41 - 53 %); HCT 27.8 % (L; Ref range: 36.6 - 50.3 %); HGB 11.1 g/dL (L; Ref range: 13.5 - 17.5 g/dL); HGB 9.1 g/dL (L; Ref range: 12.1 - 17.0 g/dL); Potassium 3.7 mmol/L (Ref range: 3.5 - 5.1 mmol/L); Sodium 135 mmol/L (L; Ref range: 136 - 145 mmol/L)  9/16/2022: BUN 21 mg/dL (H; Ref range: 6 - 20 mg/dL); Calcium 7.7 mg/dL (L; Ref range: 8.5 - 10.1 mg/dL); CO2 24 mmol/L (Ref range: 21 - 32 mmol/L); Creatinine 1.20 mg/dL (Ref range: 0.70 - 1.30 mg/dL); Glucose 253 mg/dL (H; Ref range: 65 - 100 mg/dL); HCT 28.0 % (L; Ref range: 36.6 - 50.3 %); HGB 9.3 g/dL (L; Ref range: 12.1 - 17.0 g/dL); Potassium 4.0 mmol/L (Ref range: 3.5 - 5.1 mmol/L);  Sodium 135 mmol/L (L; Ref range: 136 - 145 mmol/L)  Recent Labs     09/22/22 0642 09/21/22 0227   WBC 11.1 12.6*   HGB 10.6* 9.7*   HCT 32.7* 29.3*   * 532*     Recent Labs     09/22/22  0642 09/21/22 0227 09/20/22 0125    136 137   K 3.9 3.7 3.4*    103 104   CO2 24 26 25   BUN 11 11 9   CREA 0.63* 0.63* 0.60*   GLU 90 107* 94   CA 8.4* 8.1* 8.0*   MG 2.0 2.0 1.8   PHOS 3.3 3.2 3.0     Recent Labs     09/22/22  0642 09/21/22 0227 09/20/22  0125   ALT 18 20 20   * 152* 159*   TBILI 0.8 0.8 0.7   TP 6.3* 6.1* 6.0*   ALB 1.7* 1.7* 1.7*   GLOB 4.6* 4.4* 4.3*     Recent Labs     09/21/22  1012 09/21/22 0227 09/20/22  0125   APTT 107.2* 75.4* 90.2*      No results for input(s): FE, TIBC, PSAT, FERR in the last 72 hours. No results for input(s): PH, PCO2, PO2 in the last 72 hours. No results for input(s): CPK, CKMB in the last 72 hours.     No lab exists for component: TROPONINI  Lab Results   Component Value Date/Time    Glucose (POC) 98 05/10/2022 10:57 AM       Total Time: 35 minutes    Signed:  Rabia Garza MD  9/22/2022  10:22 AM

## 2022-09-22 NOTE — PROGRESS NOTES
Pt has discharge orders home with home health. Spoke with attending and consulting providers. All stated the pt is able to discharge today. Pt is stable, alert, oriented and does not show any signs of distress. Pt is discharging without an IV, tele of tovar. Discharge plan of care/case management plan validated with provider discharge order.

## 2022-09-22 NOTE — PROGRESS NOTES
Physician Progress Note      Jorge Alberto Mata  CSN #:                  [id number]  :                       1950  ADMIT DATE:       9/15/2022 6:10 PM  [address] [place] DATE:  RESPONDING  PROVIDER #:        Walker Li MD          QUERY TEXT:    Dear Dr. Mary Alonzo, Dr. Nany Butterfield -    Pt admitted with pulmonary embolism, cardiogenic shock, BLE DVTs, metastatic pancreatic cancer. Noted documentation of sepsis by Infectious Disease consultant. If possible, please document in progress notes and discharge summary:    The medical record reflects the following:  Risk Factors: 71 M admitted with metastatic pancreatic cancer, pulmonary embolism, BLE DVTs, cardiogenic shock  Clinical Indicators: per Infectious Disease consultant documentation, \"1. Presumptive sepsis with leukocytosis, elevated CRP and procal, etiology unclear, Ceretec scan negative, on IV Zosyn\"; WBC 19.9. Cherl Spinner Cherl Spinner 14.6. ..16.2. ..14.2. Cherl Spinner Cherl Spinner 14.7. ..13.3. ..12.1. ..12.6. ..11.1; LA 2.4. ..2.4.. Cherl Spinner 2.1; CRP 16.90. ..16.30. ..20.50. ..16.60. ..13.70. ..14.40. ..12.80; procalcitonin 1.54. ..1.10. ..0.98. ..0.82. ..0.49. ..0.55. ..0.39; tmax 99.8; +tachycardia; +hypotension -> cardiogenic shock  Treatment: labs, CT, ID consult, Ceretec scan, IV Zosyn    Thank you,  SANTIAGO Larry, RN, CRCR  Clinical   [email or web address] or contact via [employer]  Options provided:  -- Sepsis confirmed present on admission  -- Sepsis confirmed not present on admission  -- Sepsis ruled out  -- Other - I will add my own diagnosis  -- Disagree - Not applicable / Not valid  -- Disagree - Clinically unable to determine / Unknown  -- Refer to Clinical Documentation Reviewer    PROVIDER RESPONSE TEXT:    The diagnosis of sepsis was confirmed as present on admission.     Query created by: Eliot Mosquera on 2022 8:40 AM      Electronically signed by:  Walker Li MD 2022 10:18 AM

## 2022-09-22 NOTE — PROGRESS NOTES
Progress Note    Patient: Rapheal Lesch MRN: [id number]  SSN: [id number]    YOB: 1950  Age: 71 y.o. Sex: male      Admit Date: 9/15/2022    LOS: 7 days     Subjective:   Patient with metastatic pancreatic cancer, followed for suspected sepsis with possible UTI but no urine culture identified. Patient remains afebrile with now normal WBC and decreasing procal and CRP. Remains on IV Zosyn. Ceretec scan was negative. Patient resting comfortably with no complaints. Objective:     Vitals:    09/22/22 0009 09/22/22 0310 09/22/22 0750 09/22/22 0757   BP: 112/69 113/68  120/78   Pulse: 93 91  93   Resp: 18 18  18   Temp: 98.3 °F (36.8 °C) 97.5 °F (36.4 °C)  97.5 °F (36.4 °C)   SpO2: 92% 91% 94% 93%   Weight:       Height:            Intake and Output:  Current Shift: No intake/output data recorded. Last three shifts: 09/20 1901 - 09/22 0700  In: 2845.7 [P.O.:700; I.V.:2145.7]  Out: 450 [Urine:450]    Physical Exam:   Vitals and nursing note reviewed. Constitutional:       General: He is not in acute distress. Appearance: He is ill-appearing. Comments: Cachectic      HENT: unremarkable  Cardiovascular:      Rate and Rhythm: Normal rate and regular rhythm. Heart sounds: No murmur heard. Pulmonary:      Effort: Pulmonary effort is normal.      Breath sounds: Normal breath sounds. Abdominal:      General: Bowel sounds are normal. There is no distension. Palpations: Abdomen is soft. Tenderness: There is no abdominal tenderness. Genitourinary:     Comments: External urinary device  Musculoskeletal:      Right lower leg: No edema. Left lower leg: No edema. Neurological:      General: No focal deficit present. Mental Status: He is alert and oriented to person, place, and time.      Lab/Data Review:     WBC 11,100    CRP 12,800 <14.40 <13.70 <16.60 <20.50 <16.30 <16.90  Procal 0.39 <0.55 <0.82 <0.98 <1.10 <1.54    Blood cultures (9/15) No growth FINAL   Blood cultures (9/15) No growth FINAL     Ceretec scan (9/19) Negative Whole Body Nuclear WBC Scan. Assessment:     Active Problems:    Carcinoma of head of pancreas ([facility] (9/15/2022)      Hypotension (9/15/2022)      NSTEMI (non-ST elevated myocardial infarction) ([facility] (9/15/2022)  Presumptive sepsis with leukocytosis, elevated CRP and procal, etiology unclear, Ceretec scan negative, on IV Zosyn, resolving  Metastatic pancreatic cancer to lung and liver     Comment:  WBC now normal with decreasing CRP and procal.  Ceretec scan negative which provides some reassurance that there are no major foci of infection, including abscesses, but it does not rule out infection.      Plan:     Reasonable to discharge on Augmentin 875 mg po BID for 2 weeks  Reviewed lab results with patient  Cleared for discharge from ID standpoint      Signed By: Bianca Dockery MD     September 22, 2022

## 2022-09-22 NOTE — PROGRESS NOTES
Patient is clear to d/c from  to home with [facility], accepted with [address], SOC 24-48 hours after d/c. Medicare pt has received, reviewed, and signed 2nd IM letter informing them of their right to appeal the discharge. Signed copied has been placed on pt bedside chart. [facility] to transport home. Nurse notified. 2:30 PM - CM notified by PT that patient will need a rolling walker, CM met with patient, patient agreeable, choice given for [facility], referral sent, patient approved, patient and family notified that they can  walker, co-pay 7 dollars, family agreeable.     Patient remains clear to d/c from  to home with [facility]

## 2022-09-22 NOTE — PROGRESS NOTES
Problem: Falls - Risk of  Goal: *Absence of Falls  Description: Document Earma Araceli Fall Risk and appropriate interventions in the flowsheet. Outcome: Progressing Towards Goal  Note: Fall Risk Interventions:  Mobility Interventions: Bed/chair exit alarm, Patient to call before getting OOB, PT Consult for mobility concerns, PT Consult for assist device competence         Medication Interventions: Bed/chair exit alarm, Patient to call before getting OOB, Evaluate medications/consider consulting pharmacy, Teach patient to arise slowly    Elimination Interventions: Bed/chair exit alarm, Call light in reach, Toilet paper/wipes in reach, Toileting schedule/hourly rounds, Patient to call for help with toileting needs              Problem: Patient Education: Go to Patient Education Activity  Goal: Patient/Family Education  Outcome: Progressing Towards Goal     Problem: Pressure Injury - Risk of  Goal: *Prevention of pressure injury  Description: Document Erlin Scale and appropriate interventions in the flowsheet.   Outcome: Progressing Towards Goal  Note: Pressure Injury Interventions:  Sensory Interventions: Assess changes in LOC, Check visual cues for pain, Discuss PT/OT consult with provider, Float heels, Minimize linen layers, Maintain/enhance activity level, Monitor skin under medical devices    Moisture Interventions: Absorbent underpads, Internal/External urinary devices, Check for incontinence Q2 hours and as needed, Apply protective barrier, creams and emollients, Minimize layers, Maintain skin hydration (lotion/cream)    Activity Interventions: PT/OT evaluation, Increase time out of bed    Mobility Interventions: HOB 30 degrees or less, Float heels, PT/OT evaluation    Nutrition Interventions: Document food/fluid/supplement intake    Friction and Shear Interventions: Foam dressings/transparent film/skin sealants, Apply protective barrier, creams and emollients, Lift sheet, Minimize layers, Transferring/repositioning devices                Problem: Patient Education: Go to Patient Education Activity  Goal: Patient/Family Education  Outcome: Progressing Towards Goal

## 2022-09-22 NOTE — PROGRESS NOTES
PHYSICAL THERAPY EVALUATION  Patient: Jenny Max (7 y.o. male)  Date: 9/22/2022  Primary Diagnosis: NSTEMI (non-ST elevated myocardial infarction) ([facility] [address] [I21.4]  Hypotension [I95.9]  Carcinoma of head of pancreas (HCC) [C25.0]  Procedure(s) (LRB):  VENA CAVA FILTER PLACEMENT (N/A)  ULTRASOUND GUIDED VASCULAR ACCESS (N/A) 2 Days Post-Op   Precautions: falls      ASSESSMENT  Pt is a 71 y.o. male admitted on 9/15/2022 for syncope and hypotension with HH. Chest CT showed extensive right sided acute pulmonary emboli, borderline deviation of interventricular septum suggesting right heart strain, metastatic disease of lungs and liver. BLE ultrasound showed multiple acute thrombus of bilateral LE. Pt underwent IVC filter placement on 9/20/22. 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 Pt semi-supine in bed with family present throughout session with permission from pt given upon PT arrival, agreeable to evaluation. Pt A&O x 4. PLOF: Pt I ADLs/IADLs, no AD for mobility, no falls in the last 3 months. Pt resides with wife in a split level home (resides on bottom level, 4 [address] no HR); also has 3 daughters. Based on the objective data described, the patient presents with generalized weakness, impaired functional mobility, impaired amb, impaired balance, and decreased activity tolerance required rest breaks during mobility. Pt performed Rolling: Contact guard assistance, Supine to Sit: Contact guard assistance, Sit to Stand: Stand-by assistance;Contact guard assistance.  Pt amb Distance (ft): 100 Feet (ft) with Assistive Device: Gait belt;Walker, rolling, and Ambulation - Level of Assistance: Stand-by assistance; Contact guard assistance; demonstrating very NBOS (pt educated in increasing CLARA for additional stability) with slow, steady, step through gt pattern with increased unsteadiness noted when attempting to amb without AD, recommend RW for home use. Pt did fair with session today with requiring 3 standing rest breaks and 1 seated rest break with mobility this session, HR 90-120s throughout session. Pt education regarding safety at home including use of RW, installation of handrails for steps, as well as importance of caloric intake to physical activity; pt and family verbalized understanding. Pt will benefit from continued skilled PT to address above deficits and return to PLOF. Current PT DC recommendation HHPT with RW and family care. Current Level of Function Impacting Discharge (mobility/balance): SBA to CGA    Other factors to consider for discharge: severity of deficits, acute medical state      PLAN :  Recommendations and Planned Interventions: bed mobility training, transfer training, gait training, therapeutic exercises, patient and family training/education, and therapeutic activities      Recommend with staff: gt belt, RW, and CGA    Frequency/Duration: Patient will be followed by physical therapy:  3-5x/week to address goals. Recommendation for discharge: (in order for the patient to meet his/her long term goals)  Home with [address] [place] [address]    This discharge recommendation:  Has been made in collaboration with the attending provider and/or case management    IF patient discharges home will need the following DME: rolling walker         SUBJECTIVE:   Patient stated I've only been to the [facility] 1x since i've been here.     OBJECTIVE DATA SUMMARY:   HISTORY:    Past Medical History:   Diagnosis Date    Diverticulosis 4/19/2022    Hypercholesterolemia     Hyperlipemia     Hypertension     Type II diabetes mellitus ([facility] [address] 4/19/2022    Weight loss 4/19/2022     Past Surgical History:   Procedure Laterality Date    COLONOSCOPY  05/13/2015    colon screen    ENDOSCOPY VISIT-OUTPATIENT 04/20/2022       Home Situation  Home Environment: Private residence  # Steps to Enter: 4  Rails to Enter: No  One/Two Story Residence: Split level  # of Interior Steps: 20 (4 down to primary living area, 16 up(pt stays on bottom level))  [employer]: None  Lift Chair Available: No  Living Alone: No  Support Systems: Spouse/Significant Other, Child(damari)  Patient Expects to be Discharged to[de-identified] Home with home health  Current DME Used/Available at Home: Shower chair    EXAMINATION/PRESENTATION/DECISION MAKING:   Critical Behavior:  Neurologic State: Alert  Orientation Level: Oriented X4 (increased time required for month)  Cognition: Appropriate decision making     Hearing: Auditory  Auditory Impairment: None  Skin:  intact where visible  Edema: BLE +2 to +3 pitting noted   Range Of Motion:  AROM: Generally decreased, functional           PROM: Generally decreased, functional           Strength:    Strength: Generally decreased, functional       Functional Mobility:  Bed Mobility:  Rolling: Contact guard assistance  Supine to Sit: Contact guard assistance     Scooting: Contact guard assistance  Transfers:  Sit to Stand: Stand-by assistance;Contact guard assistance  Stand to Sit: Stand-by assistance;Contact guard assistance                       Balance:   Sitting: Intact; Without support  Standing: Impaired; Without support  Standing - Static: Fair;Occasional  Standing - Dynamic : Fair;Constant support  Ambulation/Gait Training:  Distance (ft): 100 Feet (ft)  Assistive Device: Gait belt;Walker, rolling  Ambulation - Level of Assistance: Stand-by assistance;Contact guard assistance     Gait Description (WDL): Exceptions to WDL           Base of Support: Narrowed     Speed/Tonja: Slow;Shuffled    Therapeutic Exercises:   Pt would benefit from LE HEP to improve overall LE AROM/strength and can be further educated in next treatment session.     Functional Measure:  [address] Mobility Inpatient Short Form  How much difficulty does the patient currently have. .. Unable A Lot A Little None   1. Turning over in bed (including adjusting bedclothes, sheets and blankets)? [] 1   [] 2   [x] 3   [] 4   2. Sitting down on and standing up from a chair with arms ( e.g., wheelchair, bedside commode, etc.)   [] 1   [] 2   [x] 3   [] 4   3. Moving from lying on back to sitting on the side of the bed? [] 1   [] 2   [x] 3   [] 4          How much help from another person does the patient currently need. .. Total A Lot A Little None   4. Moving to and from a bed to a chair (including a wheelchair)? [] 1   [] 2   [x] 3   [] 4   5. Need to walk in hospital room? [] 1   [] 2   [x] 3   [] 4   6. Climbing 3-5 steps with a railing? [] 1   [] 2   [x] 3   [] 4   © , Trustees of [facility] [employer], under license to [employer]. All rights reserved     Score:  Initial:  Most Recent: X (Date: 22 )   Interpretation of Tool:  Represents activities that are increasingly more difficult (i.e. Bed mobility, Transfers, Gait).   Score 24 23 22-20 19-15 14-10 9-7 6   Modifier CH CI CJ CK CL CM CN         Physical Therapy Evaluation Charge Determination   History Examination Presentation Decision-Making   HIGH Complexity :3+ comorbidities / personal factors will impact the outcome/ POC  HIGH Complexity : 4+ Standardized tests and measures addressing body structure, function, activity limitation and / or participation in recreation  LOW Complexity : Stable, uncomplicated  Other outcome measures ampac 6  mod      Based on the above components, the patient evaluation is determined to be of the following complexity level: LOW     Pain Ratin/10 reported    Activity Tolerance:   Fair and requires rest breaks    After treatment patient left in no apparent distress:   Bed locked and in lowest position Bed / chair alarm activated, Caregiver / family present, and seated safely EOB  and nsg updated. GOALS:    Problem: Mobility Impaired (Adult and Pediatric)  Goal: *Acute Goals and Plan of Care (Insert Text)  Description: Pt stated goal: to go home  Pt will be I with LE HEP in 7 days. Pt will perform bed mobility with mod I in 7 days. Pt will perform transfers with mod I in 7 days. Pt will amb  feet with LRAD safely with mod I in 7 days. Outcome: Not Met       COMMUNICATION/EDUCATION:   The patients plan of care was discussed with: Registered nurse and Case management. Fall prevention education was provided and the patient/caregiver indicated understanding., Patient/family have participated as able in goal setting and plan of care. , and Patient/family agree to work toward stated goals and plan of care.      Thank you for this referral.  Amara Solorio, PT, DPT   Time Calculation: 26 mins

## 2022-09-22 NOTE — PROGRESS NOTES
VSS. Patient given discharge instructions. Medications and follow-up appointments reviewed. IV and Telemetry removed. Case management, provider, and primary nurse aware of discharge. Discharge plan of care/case management plan validated with provider discharge order.

## 2022-09-22 NOTE — PROGRESS NOTES
Hospitalist Progress Note         Nichelle Reyes      Daily Progress Note: 09/22/2022      Subjective:   Mr. 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 CTA of the chest with extensive right-sided pulmonary embolism with borderline deviation of the interventricular septum and metastatic disease to lungs and liver. IR, vascular, and pulmonology consulted. Today his O2 sat remains in the low 90's but blood pressure and heart rate are within normal limits. Pt and his family would like to know when he will be discharged. He is not complaining of any shortness of breath. Problem List:   Past Medical History:   Diagnosis Date    Diverticulosis 4/19/2022    Hypercholesterolemia     Hyperlipemia     Hypertension     Type II diabetes mellitus ([facility] [address] 4/19/2022    Weight loss 4/19/2022        Medications reviewed    Active Orders   Procedures    ANTICOAGULANT THERAPY IS CONTRAINDICATED Other, please document On IV Heparin     Frequency: CONTINUOUS     Number of Occurrences: Until Specified     Order Comments:  On IV Heparin     Lab    CBC WITH AUTOMATED DIFF     Frequency: DAILY     Number of Occurrences: 7 Days    MAGNESIUM     Frequency: DAILY     Number of Occurrences: 5 Occurrences    METABOLIC PANEL, COMPREHENSIVE     Frequency: DAILY     Number of Occurrences: 7 Days    PHOSPHORUS     Frequency: DAILY     Number of Occurrences: 5 Occurrences   Diet    ADULT DIET Regular     Frequency: Effective Now     Number of Occurrences: Until Specified   Nursing    INTAKE AND OUTPUT     Frequency: Q12H     Number of Occurrences: Until Specified     Order Comments: Call for urine ouput less than 120ml in 8  hours      NOTIFY PROVIDER: LAB VALUES CHANGES     Frequency: CONTINUOUS     Number of Occurrences: Until Specified     Order Comments: CBC prior to initiation of Heparin and notify physician if platelet count is less than 150,000 thrombocytes/mcL. NOTIFY PROVIDER: LAB VALUES CHANGES     Frequency: CONTINUOUS     Number of Occurrences: Until Specified     Order Comments: Daily CBC while on heparin. If platelets less than 511,611 thrombocytes/mcL or decrease by 50% of baseline, notify physician. NOTIFY PROVIDER: LAB VALUES CHANGES     Frequency: CONTINUOUS     Number of Occurrences: Until Specified     Order Comments: CBC prior to initiation of Heparin and notify physician if platelet count is less than 150,000 thrombocytes/mcL. NOTIFY PROVIDER: LAB VALUES CHANGES     Frequency: CONTINUOUS     Number of Occurrences: Until Specified     Order Comments: Daily CBC while on heparin. If platelets less than 060,052 thrombocytes/mcL or decrease by 50% of baseline, notify physician. NOTIFY PROVIDER: SPECIFY If any sign of bleeding and/or hematoma, STOP heparin. Notify physician STAT and do STAT CBC. Hold heparin until notified by physician. ONE TIME Routine     Frequency: ONE TIME     Number of Occurrences: 1 Occurrences     Order Comments: If any sign of bleeding and/or hematoma, STOP heparin. Notify physician STAT and do STAT CBC. Hold heparin until notified by physician. NOTIFY PROVIDER: SPECIFY If any sign of bleeding and/or hematoma, STOP heparin. Notify physician STAT and do STAT CBC. Hold heparin until notified by physician. ONE TIME Routine     Frequency: ONE TIME     Number of Occurrences: 1 Occurrences     Order Comments: If any sign of bleeding and/or hematoma, STOP heparin. Notify physician STAT and do STAT CBC. Hold heparin until notified by physician.       NURSING-MISCELLANEOUS: When patient receives his dose of Eliquis tonight, please discontinue heparin drip from the [facility] CONTINUOUS     Frequency: CONTINUOUS     Number of Occurrences: Until Specified    UP AD PIERRE     Frequency: CONTINUOUS     Number of Occurrences: Until Specified    VERIFY CONSENT HAS BEEN OBTAINED ivc filter placement ONE TIME Routine     Frequency: ONE TIME     Number of Occurrences: 1 Occurrences    VITAL SIGNS     Frequency: Q4HWA     Number of Occurrences: Until Specified     Order Comments: Per unit routine     Code Status    FULL CODE     Frequency: CONTINUOUS     Number of Occurrences: Until Specified   Consult    IP CONSULT TO INFECTIOUS DISEASES     Frequency: ONE TIME     Number of Occurrences: 1 Occurrences   Nourishments    ADULT ORAL NUTRITION SUPPLEMENT AM Snack, PM Snack, HS Snack; Standard High Calorie/High Protein     Frequency: DIET EFFECTIVE NOW     Number of Occurrences: Until Specified   OT    IP CONSULT TO OCCUPATIONAL THERAPY     Frequency: ONE TIME     Number of Occurrences: 1 Occurrences   PT    IP CONSULT TO PHYSICAL THERAPY     Frequency: ONE TIME     Number of Occurrences: 1 Occurrences   Respiratory Care    OXIMETRY, SPOT CHECK     Frequency: Q8H RT     Number of Occurrences: Until Specified    OXYGEN CANNULA Liters per minute: 2; Indications for O2 therapy: HYPOXIA CONTINUOUS Routine     Frequency: CONTINUOUS     Number of Occurrences: Until Specified     Order Comments: May titrate o2 to keep Spo2 >= 90%     Medications    acetaminophen (TYLENOL) suppository 650 mg     Linked Order: Or     Frequency: Q6H PRN     Dose: 650 mg     Route: Rectal    acetaminophen (TYLENOL) tablet 650 mg     Linked Order: Or     Frequency: Q6H PRN     Dose: 650 mg     Route: Oral    apixaban (ELIQUIS) tablet 10 mg     Frequency: Q12H     Dose: 10 mg     Route: Oral    aspirin delayed-release tablet 81 mg     Frequency: DAILY     Dose: 81 mg     Route: Oral     Order Comments: OP SIG:Take  by mouth daily.       hydrALAZINE (APRESOLINE) 20 mg/mL injection 10 mg     Frequency: QID PRN     Dose: 10 mg     Route: IntraVENous    loperamide (IMODIUM) capsule 2 mg     Frequency: QID PRN     Dose: 2 mg     Route: Oral    megestroL (MEGACE) tablet 40 mg Frequency: BID     Dose: 40 mg     Route: Oral     Order Comments: OP SIG:Take 1 Tablet by mouth two (2) times a day. mirtazapine (REMERON) tablet 15 mg     Frequency: QHS     Dose: 15 mg     Route: Oral     Order Comments: OP SIG:Take 15 mg by mouth nightly. ondansetron (ZOFRAN ODT) tablet 4 mg     Linked Order: Or     Frequency: Q6H PRN     Dose: 4 mg     Route: Oral    ondansetron (ZOFRAN) injection 4 mg     Linked Order: Or     Frequency: Q6H PRN     Dose: 4 mg     Route: IntraVENous    oxyCODONE IR (ROXICODONE) tablet 5 mg     Frequency: Q6H PRN     Dose: 5 mg     Route: Oral     Order Comments: OP SIG:Take 5 mg by mouth every six (6) hours as needed for Severe Pain. oxyCODONE-acetaminophen (PERCOCET) 5-325 mg per tablet 1 Tablet     Frequency: Q4H PRN     Dose: 1 Tablet     Route: Oral    pantoprazole (PROTONIX) tablet 40 mg     Frequency: ACB     Dose: 40 mg     Route: Oral     Order Comments: OP SIG:Take 40 mg by mouth Daily (before breakfast). piperacillin-tazobactam (ZOSYN) 3.375 g in 0.9% sodium chloride (MBP/ADV) 100 mL MBP     Frequency: Q8H     Dose: 3.375 g     Route: IntraVENous    sodium chloride (NS) flush 5-40 mL     Frequency: PRN     Dose: 5-40 mL     Route: IntraVENous    traMADoL (ULTRAM) tablet 50 mg     Frequency: Q6H PRN     Dose: 50 mg     Route: Oral   Consult to Hospitalist    IP CONSULT TO HOSPITALIST     Frequency: ONE TIME     Number of Occurrences: 1 Occurrences   Consult to Surgery    IP CONSULT TO VASCULAR SURGERY     Frequency: ONE TIME     Number of Occurrences: 1 Occurrences          Review of Systems:   A comprehensive review of systems was negative except for that written in the HPI.     Objective:   Physical Exam:     Visit Vitals Patient Vitals for the past 24 hrs:   Temp Pulse Resp BP SpO2   09/22/22 0757 97.5 °F (36.4 °C) 93 18 120/78 93 %   09/22/22 0750 -- -- -- -- 94 %   09/22/22 0310 97.5 °F (36.4 °C) 91 18 113/68 91 %   09/22/22 0009 98.3 °F (36.8 °C) 93 18 112/69 92 %   09/21/22 2029 98.3 °F (36.8 °C) 92 18 102/66 92 %   09/21/22 1505 98.6 °F (37 °C) 94 18 104/63 90 %   09/21/22 1202 99.1 °F (37.3 °C) (!) 102 20 109/74 92 %       Physical Exam  Vitals reviewed. Constitutional:       General: He is not in acute distress. Appearance: He is ill-appearing. HENT:      Head: Normocephalic and atraumatic. Cardiovascular:      Rate and Rhythm: Normal rate. Pulmonary:      Effort: Pulmonary effort is normal. No respiratory distress. Breath sounds: Normal breath sounds. Skin:     General: Skin is warm. Neurological:      General: No focal deficit present. Mental Status: He is alert. Data Review:      Labs Reviewed   PTT - Abnormal; Notable for the following components:       Result Value    aPTT 69.2 (*)     All other components within normal limits   CBC WITH AUTOMATED DIFF - Abnormal; Notable for the following components:    WBC 14.6 (*)     RBC 3.26 (*)     HGB 9.1 (*)     HCT 27.8 (*)     NEUTROPHILS 83 (*)     LYMPHOCYTES 8 (*)     IMMATURE GRANULOCYTES 2 (*)     ABS. NEUTROPHILS 12.2 (*)     ABS. IMM. GRANS. 0.3 (*)     All other components within normal limits   LACTIC ACID - Abnormal; Notable for the following components:    Lactic acid 2.1 (*)     All other components within normal limits   TROPONIN-HIGH SENSITIVITY - Abnormal; Notable for the following components:    Troponin-High Sensitivity 1,924 (*)     All other components within normal limits   PTT - Abnormal; Notable for the following components:    aPTT 48.9 (*)     All other components within normal limits   CBC WITH AUTOMATED DIFF - Abnormal; Notable for the following components:    WBC 16.2 (*)     RBC 3.29 (*)     HGB 9.3 (*)     HCT 28.0 (*)     NEUTROPHILS 80 (*)     LYMPHOCYTES 9 (*)     IMMATURE GRANULOCYTES 2 (*)     ABS. NEUTROPHILS 12.9 (*)     ABS. MONOCYTES 1.5 (*)     ABS. IMM.  GRANS. 0.4 (*)     All other components within normal limits   RENAL FUNCTION PANEL - Abnormal; Notable for the following components:    Sodium 135 (*)     Glucose 253 (*)     BUN 21 (*)     GFR est non-AA 60 (*)     Calcium 7.7 (*)     Albumin 1.8 (*)     All other components within normal limits   TROPONIN-HIGH SENSITIVITY - Abnormal; Notable for the following components:    Troponin-High Sensitivity 1,728 (*)     All other components within normal limits   PTT - Abnormal; Notable for the following components:    aPTT 66.5 (*)     All other components within normal limits   PROCALCITONIN - Abnormal; Notable for the following components:    Procalcitonin 1.54 (*)     All other components within normal limits   C REACTIVE PROTEIN, QT - Abnormal; Notable for the following components:    C-Reactive protein 16.90 (*)     All other components within normal limits   PTT - Abnormal; Notable for the following components:    aPTT 57.3 (*)     All other components within normal limits   PTT - Abnormal; Notable for the following components:    aPTT 67.9 (*)     All other components within normal limits   METABOLIC PANEL, COMPREHENSIVE - Abnormal; Notable for the following components:    Glucose 109 (*)     Calcium 8.2 (*)     AST (SGOT) 45 (*)     Alk. phosphatase 153 (*)     Albumin 1.7 (*)     Globulin 4.9 (*)     A-G Ratio 0.3 (*)     All other components within normal limits   CBC WITH AUTOMATED DIFF - Abnormal; Notable for the following components:    WBC 14.2 (*)     RBC 3.67 (*)     HGB 10.4 (*)     HCT 31.7 (*)     NEUTROPHILS 82 (*)     LYMPHOCYTES 9 (*)     IMMATURE GRANULOCYTES 1 (*)     ABS. NEUTROPHILS 11.6 (*)     ABS. MONOCYTES 1.1 (*)     ABS. IMM.  GRANS. 0.2 (*)     All other components within normal limits   PROCALCITONIN - Abnormal; Notable for the following components:    Procalcitonin 1.10 (*)     All other components within normal limits   PTT - Abnormal; Notable for the following components:    aPTT 66.6 (*)     All other components within normal limits   C REACTIVE PROTEIN, QT - Abnormal; Notable for the following components:    C-Reactive protein 16.30 (*)     All other components within normal limits   PTT - Abnormal; Notable for the following components:    aPTT 62.7 (*)     All other components within normal limits   PTT - Abnormal; Notable for the following components:    aPTT 89.2 (*)     All other components within normal limits   METABOLIC PANEL, COMPREHENSIVE - Abnormal; Notable for the following components:    Creatinine 0.58 (*)     BUN/Creatinine ratio 21 (*)     Calcium 8.4 (*)     AST (SGOT) 44 (*)     Alk. phosphatase 171 (*)     Albumin 1.8 (*)     Globulin 4.7 (*)     A-G Ratio 0.4 (*)     All other components within normal limits   CBC WITH AUTOMATED DIFF - Abnormal; Notable for the following components:    WBC 14.7 (*)     RBC 3.98 (*)     HGB 11.1 (*)     HCT 34.0 (*)     PLATELET 937 (*)     NEUTROPHILS 82 (*)     LYMPHOCYTES 9 (*)     IMMATURE GRANULOCYTES 1 (*)     ABS. NEUTROPHILS 12.1 (*)     ABS. MONOCYTES 1.1 (*)     ABS. IMM. GRANS. 0.1 (*)     All other components within normal limits   C REACTIVE PROTEIN, QT - Abnormal; Notable for the following components:    C-Reactive protein 20.50 (*)     All other components within normal limits   PROCALCITONIN - Abnormal; Notable for the following components:    Procalcitonin 0.98 (*)     All other components within normal limits   PTT - Abnormal; Notable for the following components:    aPTT 64.6 (*)     All other components within normal limits   PTT - Abnormal; Notable for the following components:    aPTT 77.9 (*)     All other components within normal limits   METABOLIC PANEL, COMPREHENSIVE - Abnormal; Notable for the following components:    Sodium 135 (*)     Glucose 102 (*)     Creatinine 0.60 (*)     Calcium 8.3 (*)     Alk.  phosphatase 156 (*)     Protein, total 6.2 (*)     Albumin 1.7 (*)     Globulin 4.5 (*)     A-G Ratio 0.4 (*)     All other components within normal limits   CBC WITH AUTOMATED DIFF - Abnormal; Notable for the following components:    WBC 13.3 (*)     RBC 3.84 (*)     HGB 10.6 (*)     HCT 32.4 (*)     PLATELET 996 (*)     MPV 8.7 (*)     NEUTROPHILS 81 (*)     LYMPHOCYTES 9 (*)     IMMATURE GRANULOCYTES 1 (*)     ABS. NEUTROPHILS 10.8 (*)     ABS. MONOCYTES 1.1 (*)     ABS. IMM. GRANS. 0.1 (*)     All other components within normal limits   C REACTIVE PROTEIN, QT - Abnormal; Notable for the following components:    C-Reactive protein 16.60 (*)     All other components within normal limits   PROCALCITONIN - Abnormal; Notable for the following components:    Procalcitonin 0.82 (*)     All other components within normal limits   PTT - Abnormal; Notable for the following components:    aPTT 115.0 (*)     All other components within normal limits   PTT - Abnormal; Notable for the following components:    aPTT 77.6 (*)     All other components within normal limits   PTT - Abnormal; Notable for the following components:    aPTT 84.5 (*)     All other components within normal limits   METABOLIC PANEL, COMPREHENSIVE - Abnormal; Notable for the following components:    Potassium 3.4 (*)     Creatinine 0.60 (*)     Calcium 8.0 (*)     Alk. phosphatase 159 (*)     Protein, total 6.0 (*)     Albumin 1.7 (*)     Globulin 4.3 (*)     A-G Ratio 0.4 (*)     All other components within normal limits   CBC WITH AUTOMATED DIFF - Abnormal; Notable for the following components:    WBC 12.1 (*)     RBC 3.58 (*)     HGB 10.0 (*)     HCT 30.2 (*)     PLATELET 361 (*)     NEUTROPHILS 80 (*)     LYMPHOCYTES 10 (*)     IMMATURE GRANULOCYTES 1 (*)     ABS. NEUTROPHILS 9.6 (*)     ABS. IMM.  GRANS. 0.1 (*)     All other components within normal limits   C REACTIVE PROTEIN, QT - Abnormal; Notable for the following components:    C-Reactive protein 13.70 (*)     All other components within normal limits   PROCALCITONIN - Abnormal; Notable for the following components:    Procalcitonin 0.49 (*)     All other components within normal limits   PTT - Abnormal; Notable for the following components:    aPTT 90.2 (*)     All other components within normal limits   METABOLIC PANEL, COMPREHENSIVE - Abnormal; Notable for the following components:    Glucose 107 (*)     Creatinine 0.63 (*)     Calcium 8.1 (*)     Alk. phosphatase 152 (*)     Protein, total 6.1 (*)     Albumin 1.7 (*)     Globulin 4.4 (*)     A-G Ratio 0.4 (*)     All other components within normal limits   CBC WITH AUTOMATED DIFF - Abnormal; Notable for the following components:    WBC 12.6 (*)     RBC 3.48 (*)     HGB 9.7 (*)     HCT 29.3 (*)     PLATELET 237 (*)     NEUTROPHILS 79 (*)     IMMATURE GRANULOCYTES 1 (*)     ABS. NEUTROPHILS 9.9 (*)     ABS. IMM. GRANS. 0.1 (*)     All other components within normal limits   PTT - Abnormal; Notable for the following components:    aPTT 75.4 (*)     All other components within normal limits   C REACTIVE PROTEIN, QT - Abnormal; Notable for the following components:    C-Reactive protein 14.40 (*)     All other components within normal limits   PROCALCITONIN - Abnormal; Notable for the following components:    Procalcitonin 0.55 (*)     All other components within normal limits   PTT - Abnormal; Notable for the following components:    aPTT 107.2 (*)     All other components within normal limits   METABOLIC PANEL, COMPREHENSIVE - Abnormal; Notable for the following components:    Creatinine 0.63 (*)     Calcium 8.4 (*)     Alk. phosphatase 153 (*)     Protein, total 6.3 (*)     Albumin 1.7 (*)     Globulin 4.6 (*)     A-G Ratio 0.4 (*)     All other components within normal limits   CBC WITH AUTOMATED DIFF - Abnormal; Notable for the following components:    RBC 3.73 (*)     HGB 10.6 (*)     HCT 32.7 (*)     PLATELET 069 (*)     MPV 8.8 (*)     NEUTROPHILS 79 (*)     LYMPHOCYTES 11 (*)     IMMATURE GRANULOCYTES 1 (*)     ABS. NEUTROPHILS 8.8 (*)     ABS. IMM.  GRANS. 0.2 (*)     All other components within normal limits   C REACTIVE PROTEIN, QT - Abnormal; Notable for the following components:    C-Reactive protein 12.80 (*)     All other components within normal limits   PROCALCITONIN - Abnormal; Notable for the following components:    Procalcitonin 0.39 (*)     All other components within normal limits   MAGNESIUM   PHOSPHORUS   MAGNESIUM   PHOSPHORUS   MAGNESIUM   PHOSPHORUS         NM INFLAM PROC WH BODY  Result Date: 09/20/2022  2:57 PM    EXAM: Nuclear Medicine Inflammation/WBC Scan is performed with whole body  imaging with IV injection of WBCs labeled with 11.8 mCi technetium 99m Ceretec  and appropriate delay. INDICATION: Unexplained sepsis in patient with metastatic pancreatic cancer to  lungs and liver     Comparison Bone Scan: None. Correlation Imaging Studies: None. TECHNIQUE: Anterior and posterior whole body scintigraphic planar images are  obtained. FINDINGS:  There is physiologic WBC accumulation in the liver, spleen, marrow and minimal  diffuse pulmonary. There is no abnormal accumulation of radiolabeled WBCs --no scintigraphically  apparent abscess/inflammation. IMPRESSION  Negative Whole Body Nuclear WBC Scan. Reading Physician: Nany Moreno MD  Signing Physician:   Halie Che MD        CT CHEST WO CONT  Result Date: 09/15/2022  5:32 PM    EXAM: CT CHEST WO CONT, CT ABD PELV WO CONT    INDICATION: Syncope, nonspecific pulmonary opacities. History of pancreatic and  lung cancer. COMPARISON: No comparison CT chest. CT abdomen on 8/4/2022. TECHNIQUE: Helical CT of the chest, abdomen, and pelvis without IV contrast.  Oral contrast was not utilized. Coronal and sagittal reformats were generated. CT dose reduction was achieved through use of a standardized protocol tailored  for this examination and automatic exposure control for dose modulation. Absence  of intravenous contrast limits evaluation of the mediastinum, balaji, vasculature,  and solid organs.     FINDINGS:    THYROID: No nodule. MEDIASTINUM: No mass or lymphadenopathy. MUNIR: No gross mass. THORACIC AORTA: Atherosclerosis without aneurysm. MAIN PULMONARY ARTERY: 3.6 cm in transverse diameter and larger than the  ascending aorta. Findings are compatible with chronic pulmonary hypertension. HEART: Small size, small pericardial effusion, no change. Coronary artery  calcification atherosclerosis is better imaged and extensive. ESOPHAGUS: No wall thickening or dilatation. TRACHEA/BRONCHI: Patent. PLEURA: No effusion or pneumothorax. LUNGS: Pulmonary nodules and nodular opacities are better imaged and grossly  unchanged. Anterior right upper lobe nodule measures 1.3 cm. Anterior right  lower lobe nodular opacity measures 2.2 cm. Lateral apical left upper lobe  nodular opacity measures 0.8 cm. Posterior left lower lobe nodular opacity  measures 1.3 cm. No fibrosis. Liver: Increased size and number of ill-defined masses. Segment 8 mass  previously measured 2.1 cm and now measures 4.6 cm. Segment 3 mass previously  measured 2.3 cm and now measures 5.3 cm. New segment 5 mass measures 7.6 cm. Pneumobilia is new. Biliary tree: Gallbladder contains gas. CBD stent is new and in good position. Spleen: Heterogeneous splenomegaly with no evidence of infarcts or masses. Limited evaluation without contrast.  Pancreas: Ill-defined pancreatic head and body mass is grossly unchanged. Limited evaluation without IV contrast.  Adrenals: Bilateral hypertrophy. No change. Kidneys: No mass or hydronephrosis. Bilateral renal cysts are unchanged. Stomach: Incomplete distention, limited evaluation. Small bowel: No obstruction. Colon: Incomplete distention, limited evaluation. Appendix: Normal.  Peritoneum: No ascites or pneumoperitoneum. Retroperitoneum: Aortic atherosclerosis without aneurysm. No lymphadenopathy. Reproductive organs: Heterogeneous prostatomegaly contains calcifications. Urinary bladder: No mass or calculus.   Bones: No destructive bone lesion. Abdominal wall: No mass or hernia. Additional comments: N/A    IMPRESSION    1. Rapid progression of metastatic disease. 2. Metastatic pulmonary nodules are better imaged but grossly unchanged. 3. Progression of hepatic metastatic disease. New and increased size of hepatic  masses. 4. New splenic infarcts versus splenic masses. 5. Ill-defined pancreatic head mass. The CBD stent is in good position. Reading Physician: Riki Nunez MD  Signing Physician:   Janette Marion MD        CTA CHEST W OR W WO CONT  Result Date: 09/16/2022  3:32 PM    INDICATION: History of pancreatic and lung cancer evaluate for acute PE    COMPARISON: Prior day    TECHNIQUE:  2.5 mm axial images were obtained from the apices of the lung bases  after the intravenous administration of 100 cc of Isovue-370. Three-dimensional  postprocessing was performed by the technologist with MIP reconstructions. CT  dose reduction was achieved through use of a standardized protocol tailored for  this examination and automatic exposure control for dose modulation. FINDINGS:    THYROID: No nodule. MEDIASTINUM: No mass or lymphadenopathy. MUNIR: No mass or lymphadenopathy. THORACIC AORTA: No dissection or aneurysm. MAIN PULMONARY ARTERY: No acute pulmonary embolus  TRACHEA/BRONCHI: Patent. ESOPHAGUS: No wall thickening or dilatation. HEART: Moderate pericardial effusion. Borderline deviation of the  interventricular septum  PLEURA: Small effusion  LUNGS: Dense bilateral pulmonary nodules  INCIDENTALLY IMAGED UPPER ABDOMEN: Liver metastases again demonstrated  BONES: No destructive bone lesion. IMPRESSION    1. Extensive right-sided acute pulmonary emboli. Borderline deviation of the  interventricular septum suggesting right heart strain  2. Metastatic disease to lungs and liver    Findings were phoned to the floor at 1531 hours to Jennifer the .  A  perfect serve text was sent to [employer] [facility]      Reading Physician: Alicia Mares MD  Signing Physician:   Jose Bolden MD        CT ABD PELV WO CONT  Result Date: 09/15/2022  5:32 PM    EXAM: CT CHEST WO CONT, CT ABD PELV WO CONT    INDICATION: Syncope, nonspecific pulmonary opacities. History of pancreatic and  lung cancer. COMPARISON: No comparison CT chest. CT abdomen on 8/4/2022. TECHNIQUE: Helical CT of the chest, abdomen, and pelvis without IV contrast.  Oral contrast was not utilized. Coronal and sagittal reformats were generated. CT dose reduction was achieved through use of a standardized protocol tailored  for this examination and automatic exposure control for dose modulation. Absence  of intravenous contrast limits evaluation of the mediastinum, balaji, vasculature,  and solid organs. FINDINGS:    THYROID: No nodule. MEDIASTINUM: No mass or lymphadenopathy. BALAJI: No gross mass. THORACIC AORTA: Atherosclerosis without aneurysm. MAIN PULMONARY ARTERY: 3.6 cm in transverse diameter and larger than the  ascending aorta. Findings are compatible with chronic pulmonary hypertension. HEART: Small size, small pericardial effusion, no change. Coronary artery  calcification atherosclerosis is better imaged and extensive. ESOPHAGUS: No wall thickening or dilatation. TRACHEA/BRONCHI: Patent. PLEURA: No effusion or pneumothorax. LUNGS: Pulmonary nodules and nodular opacities are better imaged and grossly  unchanged. Anterior right upper lobe nodule measures 1.3 cm. Anterior right  lower lobe nodular opacity measures 2.2 cm. Lateral apical left upper lobe  nodular opacity measures 0.8 cm. Posterior left lower lobe nodular opacity  measures 1.3 cm. No fibrosis. Liver: Increased size and number of ill-defined masses. Segment 8 mass  previously measured 2.1 cm and now measures 4.6 cm. Segment 3 mass previously  measured 2.3 cm and now measures 5.3 cm. New segment 5 mass measures 7.6 cm. Pneumobilia is new.   Biliary tree: Gallbladder contains gas. CBD stent is new and in good position. Spleen: Heterogeneous splenomegaly with no evidence of infarcts or masses. Limited evaluation without contrast.  Pancreas: Ill-defined pancreatic head and body mass is grossly unchanged. Limited evaluation without IV contrast.  Adrenals: Bilateral hypertrophy. No change. Kidneys: No mass or hydronephrosis. Bilateral renal cysts are unchanged. Stomach: Incomplete distention, limited evaluation. Small bowel: No obstruction. Colon: Incomplete distention, limited evaluation. Appendix: Normal.  Peritoneum: No ascites or pneumoperitoneum. Retroperitoneum: Aortic atherosclerosis without aneurysm. No lymphadenopathy. Reproductive organs: Heterogeneous prostatomegaly contains calcifications. Urinary bladder: No mass or calculus. Bones: No destructive bone lesion. Abdominal wall: No mass or hernia. Additional comments: N/A    IMPRESSION    1. Rapid progression of metastatic disease. 2. Metastatic pulmonary nodules are better imaged but grossly unchanged. 3. Progression of hepatic metastatic disease. New and increased size of hepatic  masses. 4. New splenic infarcts versus splenic masses. 5. Ill-defined pancreatic head mass. The CBD stent is in good position. Reading Physician: Krunal Lowery MD  Signing Physician:   Radha Wing MD        XR CHEST PORT  Result Date: 09/15/2022  3:09 PM    INDICATION:  Syncope, history of pancreatic and lung cancer. EXAM: Chest single view. COMPARISON: CT abdomen 8/4/2022. FINDINGS: A single frontal view of the chest at 1359 hours shows patchy  bilateral lung infiltrates and interstitial prominence, with moderate lung  volumes. Linear density projecting over the right lung is thought to represent a  skin fold. .  The heart, mediastinum and pulmonary vasculature are normal  . The  tracheal air shadow is normal. .  The bony thorax is unremarkable for age. Bre Dye     IMPRESSION  Patchy diffuse mild lung infiltrates and interstitial prominence, new since  8/4/2022 at the level of the lung bases which were clear at that time. .  Correlate for infection versus developing edema. .        Reading Physician: Jacob Vargas MD  Signing Physician:   Ale May MD        ECHO ADULT COMPLETE  Result Date: 09/16/2022 12:55 PM        Left Ventricle: Normal left ventricular systolic function with a visually estimated EF of 55 - 60%. Left ventricle size is normal. Mild septal thickening. Normal wall motion. Normal diastolic function. Right Ventricle: Right ventricle is mildly dilated. Normal wall thickness. Moderately reduced systolic function. Global hypokinesis present. Mitral Valve: Mild regurgitation. Left Atrium: Left atrium is mildly dilated. Right Atrium: Right atrium is mildly dilated. Pericardium: Moderate (1-2 cm) pericardial effusion present. No indication of cardiac tamponade. Technical qualifiers: Echo study was technically difficult and technically difficult due to patient's body habitus. Reading Physician: Mathew Sinclair MD  Signing Physician:   Shawnee Harris MD        DUPLEX LOWER EXT VENOUS BILAT  Result Date: 09/17/2022  7:46 AM    Acute thrombus present in the right popliteal vein. Acute thrombus present in the right soleal vein. Acute thrombus present in the right posterior tibial vein. Acute thrombus present in the right peroneal vein. Acute occlusive thrombus present in the left common femoral vein. Acute occlusive thrombus present in the left proximal femoral vein. Acute occlusive thrombus present in the left mid femoral vein. Acute occlusive thrombus present in the left distal femoral vein. Acute occlusive thrombus present in the left popliteal vein. Acute occlusive thrombus present in the left gastrocnemius vein. Acute thrombus present in the left soleal vein. Acute thrombus present in the left posterior tibial vein.   Acute thrombus present in the left peroneal vein. Acute thrombus present in the right gastrocnemius vein.          Reading Physician: Sola Lemus MD  Signing Physician:   Vicki Andersen MD        Assessment/ Plan:     Metastatic pancreatic cancer with obstructive jaundice   Pain control  Oncology consulted  Pulmonary embolism w/ RV Strain & Bilat LE DVT  CTA of the chest with extensive right-sided pulmonary embolism with borderline deviation of the interventricular septum and metastatic disease to lungs and liver  Was discussed with IR, Dr. Zenaida Hatfield, recommending a heparin drip with no acute intervention at this time  IR, vascular, and pulmonology consulted  Cont Hep gtt --> Eliquis   IVC Filter per Dr. Ludmila Knott:     Once cleared by vascular, IR and pulmonology, plan to discharge pt home  Pt is full code    David Chavis

## 2022-09-22 NOTE — PROGRESS NOTES
Pulmonary & Critical Care Progress Note    Subjective:     Patient seen and examined in his room on the floor this morning, no acute events overnight. Case discussed in detail at the bedside with the patient and his family. Saturating well on room air. CBC/BMP stable, procalcitonin/CRP coming down. Being discharged home today with Augmentin and Eliquis. We will follow-up with [facility] hematology/oncology for further pancreatic cancer management. From a pulmonary standpoint, the patient is cleared for discharge. The pulmonary service will sign off now         No Known Allergies     Review of Systems: All 10 systems were reviewed. Positive pertinent findings are mentioned above. Rest of the examination review essentially unremarkable    Objective:   Blood pressure 127/73, pulse 94, temperature 98.1 °F (36.7 °C), resp. rate 18, height 5' 6\" (1.676 m), weight 65.3 kg (144 lb), SpO2 92 %. Temp (24hrs), Av.2 °F (36.8 °C), Min:97.5 °F (36.4 °C), Max:99.1 °F (37.3 °C)    NM INFLAM PROC WH BODY   Final Result   Negative Whole Body Nuclear WBC Scan. DUPLEX LOWER EXT VENOUS BILAT   Final Result      CTA CHEST W OR W WO CONT   Final Result      1. Extensive right-sided acute pulmonary emboli. Borderline deviation of the   interventricular septum suggesting right heart strain   2. Metastatic disease to lungs and liver      Findings were phoned to the floor at 1531 hours to Jennifer the . A   perfect serve text was sent to [facility]         Data Review: CBC:   Recent Labs     22  0642 22  0227 22  0125   WBC 11.1 12.6* 12.1*   RBC 3.73* 3.48* 3.58*   HGB 10.6* 9.7* 10.0*   HCT 32.7* 29.3* 30.2*   * 532* 438*   GRANS 79* 79* 80*   LYMPH 11* 12 10*   EOS 1 1 1       Liver Enzymes:   Recent Labs     22  0642   TP 6.3*   ALB 1.7*   *       ABGs: No results for input(s): PH, PCO2, PO2, HCO3 in the last 72 hours.   Current Facility-Administered Medications Medication Dose Route Frequency    apixaban (ELIQUIS) tablet 10 mg  10 mg Oral Q12H    loperamide (IMODIUM) capsule 2 mg  2 mg Oral QID PRN    hydrALAZINE (APRESOLINE) 20 mg/mL injection 10 mg  10 mg IntraVENous QID PRN    oxyCODONE-acetaminophen (PERCOCET) 5-325 mg per tablet 1 Tablet  1 Tablet Oral Q4H PRN    traMADoL (ULTRAM) tablet 50 mg  50 mg Oral Q6H PRN    aspirin delayed-release tablet 81 mg  81 mg Oral DAILY    megestroL (MEGACE) tablet 40 mg  40 mg Oral BID    oxyCODONE IR (ROXICODONE) tablet 5 mg  5 mg Oral Q6H PRN    mirtazapine (REMERON) tablet 15 mg  15 mg Oral QHS    pantoprazole (PROTONIX) tablet 40 mg  40 mg Oral ACB    sodium chloride (NS) flush 5-40 mL  5-40 mL IntraVENous PRN    acetaminophen (TYLENOL) tablet 650 mg  650 mg Oral Q6H PRN    Or    acetaminophen (TYLENOL) suppository 650 mg  650 mg Rectal Q6H PRN    ondansetron (ZOFRAN ODT) tablet 4 mg  4 mg Oral Q6H PRN    Or    ondansetron (ZOFRAN) injection 4 mg  4 mg IntraVENous Q6H PRN    piperacillin-tazobactam (ZOSYN) 3.375 g in 0.9% sodium chloride (MBP/ADV) 100 mL MBP  3.375 g IntraVENous Q8H        Exam:      This is an elderly male who is on nasal cannula oxygen. Currently is not in any distress. He is alert and oriented x3. Head normocephalic and atraumatic, pupils are round responsive to light sclera icteric and conjunctive are pink. Neck is supple. No cervical lymphadenopathy. Thyroid not enlarged  Chest: Fair entry of air bilaterally. Few basal rhonchi audible at right lung base. No wheezing was appreciated  Heart: S1-S2 normal, and is tachycardic  Abdomen: Soft, nontender, no visceromegaly  Extremities: No edema, sinus or clubbing  Neuro: No focal motor deficit. Impression: This is an elderly male who has known history of hypertension and hyperlipidemia. As a part of his recent weight loss he underwent work-up including CT scan of abdomen which showed pancreatic cancer with metastasis to liver and lungs.   He was admitted because of hypotension and hypoxia. His CT scan of chest showed tensive right lung pulmonary embolism with right ventricular strain. Plan:   1. Acute pulmonary embolism  Patient has acute right lung extensive pulm embolism with right ventricular strain. Preliminary results of his leg Doppler showed bilateral DVT. Was earlier seen by IR. He was ruled out to be thrombectomy candidate. He is not a candidate for tPA because of his extensive metastasis to liver and lungs. CT scan of head is not available to see if he has any metastasis to his brain. Currently on heparin drip, transition to Eliquis at discharge  IVC filter placed 9/20/2022  On room air  Patient will be discharged home today, the pulmonary service will sign off now. 2.  Pancreatic cancer with metastasis to liver and lungs  Supportive care. Continue supplemental oxygen. Follows with [facility]    3.  Leukocytosis  Improving, continue on Augmentin at discharge  Appreciate assistance from infectious disease  Tagged white blood cell scan negative    4. Hypokalemia  Potassium level normalized today  Status post oral KCl       Goals of care needs to be discussed with patient and family considering his advanced stage of disease. Questions of patient were answered at bedside in detail  Case discussed in detail with RN, RT, and care team  Thank you for involving me in the care of this patient  The pulmonary service will sign off now    Time spent more than 30 minutes under patient care with no overlap reviewing results and records, decision making, and answering questions.       Colton Toscano,   [facility] ([facility])

## 2023-05-25 RX ORDER — OMEPRAZOLE 40 MG/1
40 CAPSULE, DELAYED RELEASE ORAL
COMMUNITY
Start: 2022-09-12

## 2023-05-25 RX ORDER — ASPIRIN 81 MG/1
81 TABLET ORAL DAILY
COMMUNITY

## 2023-05-25 RX ORDER — VALSARTAN 320 MG/1
320 TABLET ORAL DAILY
COMMUNITY

## 2023-05-25 RX ORDER — AMLODIPINE BESYLATE 10 MG/1
10 TABLET ORAL DAILY
COMMUNITY
Start: 2022-04-01

## 2023-05-25 RX ORDER — TAMSULOSIN HYDROCHLORIDE 0.4 MG/1
0.4 CAPSULE ORAL DAILY
COMMUNITY

## 2023-05-25 RX ORDER — ROSUVASTATIN CALCIUM 40 MG/1
40 TABLET, COATED ORAL DAILY
COMMUNITY
Start: 2022-04-01

## 2023-05-25 RX ORDER — MIRTAZAPINE 15 MG/1
15 TABLET, FILM COATED ORAL NIGHTLY
COMMUNITY
Start: 2022-09-12

## 2023-05-25 RX ORDER — OXYCODONE HYDROCHLORIDE 5 MG/1
5 TABLET ORAL EVERY 6 HOURS PRN
COMMUNITY
Start: 2022-09-12

## (undated) DEVICE — PAD,PREPPING,CUFFED,24X48,7",NONSTERILE: Brand: MEDLINE

## (undated) DEVICE — SOLUTION IRRIG 1000ML STRL H2O USP PLAS POUR BTL

## (undated) DEVICE — PINNACLE INTRODUCER SHEATH: Brand: PINNACLE

## (undated) DEVICE — RADIFOCUS GLIDEWIRE: Brand: GLIDEWIRE

## (undated) DEVICE — BOWL UTIL 16OZ STRL --

## (undated) DEVICE — TUBING, SUCTION, 9/32" X 10', STRAIGHT: Brand: MEDLINE

## (undated) DEVICE — SPONGE GZ W4XL4IN COT 12 PLY TYP VII WVN C FLD DSGN

## (undated) DEVICE — 3M™ TEGADERM™ TRANSPARENT FILM DRESSING FRAME STYLE WITH BORDER, 1616, 4 IN X 4-3/4 IN (10 CM X 12 CM), 50/CT 4CT/CASE: Brand: 3M™ TEGADERM™

## (undated) DEVICE — GLOVE ORANGE PI 7 1/2   MSG9075

## (undated) DEVICE — WASH CLOTH INCONT LO LINT PREM 12X13 IN LF DISP

## (undated) DEVICE — SURGICAL PROCEDURE TRAY CRD CATH 3 PRT

## (undated) DEVICE — FCPS RAD JAW 4 SC 240CM W/NDL --

## (undated) DEVICE — 1200CC GUARDIAN II: Brand: GUARDIAN

## (undated) DEVICE — JELLY,LUBE,STERILE,FLIP TOP,TUBE,4-OZ: Brand: MEDLINE